# Patient Record
Sex: MALE | Race: WHITE | NOT HISPANIC OR LATINO | Employment: OTHER | URBAN - METROPOLITAN AREA
[De-identification: names, ages, dates, MRNs, and addresses within clinical notes are randomized per-mention and may not be internally consistent; named-entity substitution may affect disease eponyms.]

---

## 2021-10-10 ENCOUNTER — HISTORICAL (OUTPATIENT)
Dept: ADMINISTRATIVE | Facility: HOSPITAL | Age: 81
End: 2021-10-10

## 2022-04-29 VITALS
BODY MASS INDEX: 27.18 KG/M2 | OXYGEN SATURATION: 99 % | WEIGHT: 163.13 LBS | SYSTOLIC BLOOD PRESSURE: 119 MMHG | DIASTOLIC BLOOD PRESSURE: 75 MMHG | HEIGHT: 65 IN

## 2022-05-02 NOTE — HISTORICAL [facility]
This is a historical note converted from Omid. Formatting and pictures may have been removed.  Please reference Omid for original formatting and attached multimedia. Chief Complaint  fell and hurt left middle finger x 1 hour ago  History of Present Illness  Patient presents to the clinic shortly after injuring his left middle finger. ?He fell, the finger was crooked, he bent it back in place and placed a splint.  Review of Systems  Constitutional_no fever, fatigue, weakness  Musculoskeletal_left third finger injury  Integumentary_no skin rash or abnormal lesion  Neurologic_no headache, no dizziness, no weakness or numbness  ?  Physical Exam  Vitals & Measurements  T:?36.6? ?C (Oral)? HR:?68(Peripheral)? RR:?16? BP:?119/75? SpO2:?99%?  HT:?165.00?cm? WT:?74.000?kg? BMI:?27.18?  General_well-developed well-nourished in no acute distress  Musculoskeletal_some swelling, there does appear to be probable deformity of the proximal phalanx of the left middle finger  Integumentary_no rashes or skin lesions present  Neurologic_ cranial nerves intact, no signs of peripheral neurological deficit, motor/sensory function intact?  ?  After?informed consent was obtained,?a digital block was performed?on the affected finger, and using gentle traction?the PIP?joint?was reduced successfully.? Postreduction x-rays?showed?successful reduction of the dislocation,?slight extensor deviation  ?  Assessment/Plan  1.?Dislocated finger?S63.175A  ?We will contact with x-ray report?when available  Continue to wear the finger splint,?I would also suggest that they obtain something more comfortable over-the-counter  Orthopedic referral will be placed for further care  May use over-the-counter medications for pain  Ordered:  Finger Splint PC, 10/10/21 16:11:00 CDT, [facility]-RR, Routine, 10/10/21 16:11:00 CDT, Dislocated finger  ?  Orders:  XR Hand Third Digit Left Min 2 Views, Routine, 10/10/21 15:29:00 CDT, None, Ambulatory, Rad Type, Contusion of  left middle finger, Not Scheduled, 10/10/21 15:29:00 CDT  Referrals  Ambulatory Referral, Specialty: Orthopedic Surgery, Start: 10/10/21 16:09:00 CDT, Dislocated finger   Problem List/Past Medical History  Ongoing  Dislocated finger  Obesity  Historical  No qualifying data  Procedure/Surgical History  Coronary Angiogram/Stents  hernia repair   Medications  aspirin 81 mg oral Delayed Release (EC) tablet, 81 mg= 1 tab(s), Oral, Daily  CARVEDILOL 12.5 MG TABS, 12.5 mg= 1 tab(s), Oral, BID  CLOPIDOGREL 75 MG TABS, 75 mg= 1 tab(s), Oral, Daily  esomeprazole 40 mg oral DR capsule, 40 mg= 1 cap(s), Oral, Daily  lisinopril 5 mg oral tablet, 5 mg= 1 tab(s), Oral, Daily  LISINOPRIL 5 MG TABS, 5 mg= 1 tab(s), Oral, Daily  rosuvastatin 20 mg oral tablet, 20 mg= 1 tab(s), Oral, Daily  Zetia 10 mg oral tablet, 10 mg= 1 tab(s), Oral, Daily  Allergies  No Known Allergies  No Known Medication Allergies  Social History  Abuse/Neglect  No, 10/10/2021  Alcohol  Never, 06/12/2018  Employment/School  Employed, 06/12/2018  Home/Environment  Lives with Spouse. Living situation: Home/Independent., 06/12/2018  Substance Use  Never, 06/12/2018  Tobacco  Smoker, current status unknown, No, 10/10/2021  Family History  Kidney disease.....: Mother.  Primary malignant neoplasm of breast: Sister (Dx at 40).  Primary malignant neoplasm of colon: Father (Dx at 62) and Sister (Dx at 40).  Uterine cancer.....: Sister (Dx at 20).  Health Maintenance  Health Maintenance  ???Pending?(in the next year)  ??? ??OverDue  ??? ? ? ?Smoking Cessation due??01/01/21??Variable frequency  ??? ? ? ?Advance Directive due??01/02/21??and every 1??year(s)  ??? ? ? ?Cognitive Screening due??01/02/21??and every 1??year(s)  ??? ? ? ?Fall Risk Assessment due??01/02/21??and every 1??year(s)  ??? ? ? ?Functional Assessment due??01/02/21??and every 1??year(s)  ??? ??Due?  ??? ? ? ?ADL Screening due??10/10/21??and every 1??year(s)  ??? ? ? ?Medicare Annual Wellness Exam  due??10/10/21??and every 1??year(s)  ??? ? ? ?Tetanus Vaccine due??10/10/21??and every 10??year(s)  ??? ??Due In Future?  ??? ? ? ?Obesity Screening not due until??01/01/22??and every 1??year(s)  ???Satisfied?(in the past 1 year)  ??? ??Satisfied?  ??? ? ? ?Blood Pressure Screening on??10/10/21.??Satisfied by Sherri Khan LPN  ??? ? ? ?Body Mass Index Check on??10/10/21.??Satisfied by Sherri Khan LPN  ??? ? ? ?Influenza Vaccine on??10/10/21.??Satisfied by Sherri Khan LPN  ??? ? ? ?Obesity Screening on??10/10/21.??Satisfied by Sherri Khan LPN  ?

## 2022-05-06 ENCOUNTER — TELEPHONE (OUTPATIENT)
Dept: HEMATOLOGY/ONCOLOGY | Facility: CLINIC | Age: 82
End: 2022-05-06
Payer: MEDICARE

## 2022-05-10 ENCOUNTER — OFFICE VISIT (OUTPATIENT)
Dept: HEMATOLOGY/ONCOLOGY | Facility: CLINIC | Age: 82
End: 2022-05-10
Payer: MEDICARE

## 2022-05-10 VITALS
SYSTOLIC BLOOD PRESSURE: 111 MMHG | DIASTOLIC BLOOD PRESSURE: 67 MMHG | OXYGEN SATURATION: 97 % | TEMPERATURE: 98 F | BODY MASS INDEX: 24.84 KG/M2 | HEIGHT: 65 IN | RESPIRATION RATE: 18 BRPM | HEART RATE: 66 BPM | WEIGHT: 149.13 LBS

## 2022-05-10 DIAGNOSIS — Z51.11 ENCOUNTER FOR ANTINEOPLASTIC CHEMOTHERAPY: ICD-10-CM

## 2022-05-10 DIAGNOSIS — Z71.89 GOALS OF CARE, COUNSELING/DISCUSSION: ICD-10-CM

## 2022-05-10 DIAGNOSIS — C83.13 MANTLE CELL LYMPHOMA OF INTRA-ABDOMINAL LYMPH NODES: Primary | ICD-10-CM

## 2022-05-10 PROCEDURE — 99215 PR OFFICE/OUTPT VISIT, EST, LEVL V, 40-54 MIN: ICD-10-PCS | Mod: ,,, | Performed by: STUDENT IN AN ORGANIZED HEALTH CARE EDUCATION/TRAINING PROGRAM

## 2022-05-10 PROCEDURE — 99417 PR PROLONGED SVC, OUTPT, W/WO DIRECT PT CONTACT,  EA ADDTL 15 MIN: ICD-10-PCS | Mod: ,,, | Performed by: STUDENT IN AN ORGANIZED HEALTH CARE EDUCATION/TRAINING PROGRAM

## 2022-05-10 PROCEDURE — 99417 PROLNG OP E/M EACH 15 MIN: CPT | Mod: ,,, | Performed by: STUDENT IN AN ORGANIZED HEALTH CARE EDUCATION/TRAINING PROGRAM

## 2022-05-10 PROCEDURE — 99215 OFFICE O/P EST HI 40 MIN: CPT | Mod: ,,, | Performed by: STUDENT IN AN ORGANIZED HEALTH CARE EDUCATION/TRAINING PROGRAM

## 2022-05-10 RX ORDER — ROSUVASTATIN CALCIUM 20 MG/1
20 TABLET, COATED ORAL DAILY
COMMUNITY

## 2022-05-10 RX ORDER — EZETIMIBE 10 MG/1
10 TABLET ORAL DAILY
COMMUNITY

## 2022-05-10 RX ORDER — ASPIRIN 81 MG/1
81 TABLET ORAL DAILY
COMMUNITY

## 2022-05-10 RX ORDER — CARVEDILOL 12.5 MG/1
1 TABLET ORAL 2 TIMES DAILY
COMMUNITY
Start: 2022-05-07

## 2022-05-10 RX ORDER — ALLOPURINOL 300 MG/1
300 TABLET ORAL DAILY
COMMUNITY
Start: 2022-04-26 | End: 2022-06-24 | Stop reason: SDUPTHER

## 2022-05-10 NOTE — PROGRESS NOTES
Chief complaint: Fatigue     HPI: 82 y/o M w/ PMHx of HTN, HLD, GERD, CAD, PORTER referred to [facility] for lymphocytosis and lymphadenopathy    Of note, review of his records reveals a flow cytometry sent in 2018 that was consistent with CLL. He also had a single visit with Dr Snow in 2018 but never followed back.   Patient had a PET-CT as well as a bone marrow biopsy and repeat peripheral flow cytometry with a diagnosis of CLL/SLL and mantle cell lymphoma.  Patient had symptoms of fatigue and weight loss prior to his workup and diagnosis.    Today, 05/10/2022, patient presented to clinic to discuss further management.  He reports persistent fatigue, and weight loss however denies any symptoms of new lumps or bumps, drenching night sweats, fevers, chills or decreased appetite.  He remains independent with his ADLs and IADLs and continues to work on heavy machinery.  He lives with his wife and has 5 daughters who live close by.    PMHx: HTN, HLD, GERD, CAD, PORTER  PSHx: PCI x6, lipoma, hernia  Social Hx: never smoker, no ETOH, no drugs. He reports prior exposures to asbestos and pesticides, does not know which ones  Family Hx: father: colon cancer, sister: breast cancer, colon cancer  Meds: reviewed  Allergies: NKDA    Labs:  05/02/2022:  Creatinine 0.93, albumin 3.6, total protein 6.4, , potassium 5.2, WBC count 9.6, hemoglobin 11.4, .4, platelet count 92.   4/12/22 Cr 0.89 Alb 3.4 TP 6.4 Ca 9.4  TSH 2.0 Iron 58n TIBC 285 Ferritin 48 Folate 9.9 B12 426 Hep B s ag neg Uric acid 7.8 Hep C ab neg WBC 9.79 RBC 3.77 Hg 11.8 .5 RDW 13.4 PLT 91 ANC 2.79 ALC 6.36 Direct Ramos neg b2 micro 7.5 Copper 135 Hapto 225 Hep B core ab neg SPEP w/ ARABELLA normal Abs kappa 32 Abs lambda 124 SFLC ratio 0.26  3/18/22 Cr 1 AST 27 ALT 12 AlkPhos 87 Alb 4.1 TP 6.5 Ca 9.8 WBC 9.4 RBC 3.92 Hg 12.2 MCV 96 RDW 15.1  ALC 5.8 ANC 3  5/15/18 WBC 14.71 RBC 4.42 Hg 14.3 MCV 98.6 RDW 13  ANC 2.91 ALC 10.13  AMC 1.29    Imagin/10/22:-  right lower extremity Doppler negative for DVT      22 PET CT: multiple hypermetabolic LNs b/l neck. Right submandibular LN .7x1.7cm, SUV 7.8. Left submandibular LN 2.3x1.7cm SUV 7.2. Multiple hypermetabolic LNs in chest. Right axillary node 3.3x2cm SUV 8.1. Hypermetabolic left axillary LN SUV 6.4, 1.9x3.2cm. Hypermetabolic right paratracheal LN 2.4x1.6cm SUV 4.9. Right pulmonary hilum SUV 2.7. Subcarinal LN SUV 3.3, 1.2x3.3cm. Spleen enlarged at 15cm and demonstrates heterogeneous mild to moderate uptake SUV 5.2. Moderately intense activity in stomach and proximal duodenum with no degree of focality or discrete mass on CT. Multiple hypermetabolic LNs in abd and pelvis. Left periaortic LN SUV 6.8, 4x3.5cm. Large hypermetabolic mesenteric mass [id number] SUV 8.5. Aortocaval LN 3.5x3.8cm SUV 5.1. Hypermetabolic mass in left pelvis 5.5x7cm SUV 7. Left external iliac LN SUV 6 2.2x2.1cm. Extensive hypermetabolism anteriorly and laterally in left abdomen, associated with soft tissue thickening that is difficult to separate from bowel loops. B/l inguinal LNs up to 2.9x3.9cm, SUV 6.6    3/29/22 CT neck w/ and w/o contrast: widespread adenopathy identified in parotic glands bilateral, submandibular space, anterior and posterior cervical chains, superior mediastinum and supraclavicular and infraclavicular spaces of the base of neck/upper chest in appearance strongly suspicious for lymphoma. Multiple subcutaneous masses are also identified in lower occipital scalp and upper neck similar to lymphadenopathy. Biopsy of prominent subcutaneous mass in right upper neck is recommended and should present the easiest location for tissue sampling    Path:  18 peripheral blood flow cytometry: CD5+ B cell population. No abnormal T cell or myeloid cell populations. CD19+, CD20+, CD5+, CD23+, CD11c+ surface kappa light chain+, CD38-, CD22+ (dim/mod), CD43+, FMC7-. Consistent with  CLL    04/26/2022:  Bone marrow biopsy-marrow which Karina hematopoiesis with 10-20% B-cell infiltrate, C5 positive B-cell population with immunophenotype typical for CLL.  CLL fish within normal limits, heavy chain mutation status cannot be determined.  6q, chromosome 12, chromosome 13, chromosome 11, chromosome 17 and t( 11;14) abnormalities not detected.    05/06/2022 left axillary lymph node core biopsy:  Mantle cell lymphoma with a subset consistent with CLL/SLL., CD5 positive monoclonal B-cell population is identified, approximately 91% of total cells, immuno phenotype is nonspecific and may be seen in CLL, marginal zone lymphoma, or large cell lymphoma.  A 2nd small monoclonal B-cell population approximately 2% of the cells identified with an immunophenotype consistent with CLL.  New genomic consultation lymphoma cells positive for CD 20, CD 5, cyclin D1 and BCL2 with a TI 67 of 10%.  There is no significant staining for CD10, BCL6.  However workup consistent with diagnosis of mantle cell lymphoma.    Review of Systems     CONSTITUTIONAL: no fevers, no chills, + weight loss, + fatigue, no weakness  HEMATOLOGIC: no abnormal bleeding, no abnormal bruising, + drenching night sweats  ONCOLOGIC: no new masses or lumps  HEENT: no vision loss, no tinnitus or hearing loss, no nose bleeding, no dysphagia, no odynophagia  CVS: no chest pain, no palpitations, no dyspnea on exertion  RESP: no shortness of breath, no hemoptysis, no cough  GI: no nausea, no vomiting, no diarrhea, no constipation, no melena, no hematochezia, no hematemesis, no abdominal pain, no increase in abdominal girth  : no dysuria, no hematuria, no hesitancy, no scrotal swelling, no discharge  INTEGUMENT: no rashes, no abnormal bruising, no nail pitting, no hyperpigmentation  NEURO: no falls, no memory loss, no paresthesias or dysesthesias, no urofecal incontinence or retention, no loss of strength on any extremity  MSK: no back pain, no new  joint pain, no joint swelling  PSYCH: no suicidal or homicidal ideation, no depression, no insomnia, no anhedonia  ENDOCRINE: no heat or cold intolerance, no polyuria, no polydipsia      Physical Exam     Vitals:    05/10/22 1023   BP: 111/67   Pulse: 66   Resp: 18   Temp: 98 °F (36.7 °C)       GA: AAOx3, NAD  HEENT: NCAT, PERRLA, EOMI, edentulous, no oral ulcers  LYMPH: multiple palpable occipital, posterior and anterior cervical chain b/l, supraclav b/l, axillary b/l, inguinal b/l LNs.   CVS: s1s2 RRR, no M/R/G  RESP: CTA b/l, no crackles, no wheezes or rhonchi  ABD: soft, NT, ND, BS+, no hepatomegaly, mild splenomegaly  EXT: no deformities, right lower extremity edema.  SKIN: no rashes, no bruises or purpura, warm and dry  NEURO: normal mentation, strength 5/5 on all 4 extremities, no sensory deficits     Assessment/Plan      # Mantle cell lymphoma, Clover stage III or IV based on PET/CT  Ki 67 10%, MIPI 6.7, high risk, SOX11 not known  Bone marrow biopsy negative for evidence of mantle cell lymphoma.  Positive for CLL.  PET-CT with evidence of bulky lymphadenopathy, largest measuring 14 cm, spleen measuring 15 cm.  Discussed with patient and his family the diagnosis, natural history and treatment options.  Discussed the risk for TLS given bulky lymphadenopathy, perforation given lymphadenopathy around the bowel loops as well as declining ECOG given his advanced age.  After extensive discussion with patient, his daughter and his wife, discussed the plan to proceed with dose reduced bendamustine and rituximab.  Will split rituximab 1 week after treatment with bendamustine to reduce the risk of perforation.  Will start PJP prophylaxis with Bactrim Monday Wednesday Friday and valacyclovir with treatment.  Requested Dr. Villegas for urgent port placement  Expected chemo education appointment  Plan to start chemotherapy on 05/18/2022, dose reduce bendamustine to 75% on day 1 day 2 of cycle 1.  Rituximab 375 mg per  m2 on day 7.  Patient remains on allopurinol 300 mg q.day for TLS prophylaxis.  We plan to check patient's BMP and phosphorus thrice weekly for the 1st 2 weeks.  Low threshold to start rasburicase given patient's bulky adenopathy.  Patient will benefit from home health referral for blood draws.  We will discuss with this at his next visit.     1. 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     Based on available information, likely SLL/CLL with some constitutional symptoms including weight loss, fatigue and night sweats. Adenopathy easily palpable above and below the diaphragm. Mild thrombocytopenia w/ PLT count ~120k and very mild anemia with Hg ~12 g/dl. No hypercalcemia, no evidence of immune paresis  Biopsies 4/11/22 with Dr Brannon were FNAs and non diagnostic for CLL. I do not know what IHC was obtained or whether pathologist knew what the potential diagnosis was as report only says negative for carcinoma. Will discuss with Dr Lan to add CD5, CD10, CD19, CD20 etc if enough tissue  Regardless, will also refer for axillary LN biopsy, either excisional or at the very least core. Referral sent today  PET CT 4/2022 findings are noted. Given his advanced age he was not refer for EGD. Masses in abdomen are likely matted gurinder conglomerates and I do not think it would be worth it to attempt a biopsy of those  CLL FISH  Negative , IGHV mutation status  Hep B and C negative  Uric acid mildly elevated initially, on allopurinol 300mg daily with PO hydration prior to initiation of any therapy  Given concomitant diagnosis of CLL and mantle cell lymphoma will plan to initiate treatment for mantle cell lymphoma and CLL with bendamustine rituximab.  Will consider BTK inhibitors if patient is intolerant to bendamustine/rituximab in the second-line    Plan  # port placement urgently, discussed with surgery  # chemo education  # orders placed for bendamustine rituximab, dose reduced  bendamustine by 25%.  Rituximab to be administered on day 7 to reduce the risk of perforation  # Patient will be starting Bactrim Monday Wednesday Friday and valacyclovir for prophylaxis.  # plan  BMP Monday Wednesday Friday for 1st 2 weeks for close surveillance for TLS.  # Continue acyclovir 300 mg q.day  # plan to follow-up in clinic 2 weeks after patient's cycle 1 day 1 to assess treatment tolerance.      A total of  100 minutes were spent in review of records, interpretation of test, coordination of care, discussion and counseling with the patient.

## 2022-05-11 PROBLEM — C83.13: Status: ACTIVE | Noted: 2022-05-11

## 2022-05-11 PROBLEM — Z71.89 GOALS OF CARE, COUNSELING/DISCUSSION: Status: ACTIVE | Noted: 2022-05-11

## 2022-05-11 PROBLEM — Z51.11 ENCOUNTER FOR ANTINEOPLASTIC CHEMOTHERAPY: Status: ACTIVE | Noted: 2022-05-11

## 2022-05-11 RX ORDER — EPINEPHRINE 0.3 MG/.3ML
0.3 INJECTION SUBCUTANEOUS ONCE AS NEEDED
Status: CANCELLED | OUTPATIENT
Start: 2022-05-19

## 2022-05-11 RX ORDER — HEPARIN 100 UNIT/ML
500 SYRINGE INTRAVENOUS
Status: CANCELLED | OUTPATIENT
Start: 2022-05-18

## 2022-05-11 RX ORDER — SULFAMETHOXAZOLE AND TRIMETHOPRIM 800; 160 MG/1; MG/1
1 TABLET ORAL
Qty: 30 TABLET | Refills: 3 | Status: SHIPPED | OUTPATIENT
Start: 2022-05-18 | End: 2022-07-20 | Stop reason: SDUPTHER

## 2022-05-11 RX ORDER — DIPHENHYDRAMINE HYDROCHLORIDE 50 MG/ML
50 INJECTION INTRAMUSCULAR; INTRAVENOUS ONCE AS NEEDED
Status: CANCELLED | OUTPATIENT
Start: 2022-05-18

## 2022-05-11 RX ORDER — DIPHENHYDRAMINE HYDROCHLORIDE 50 MG/ML
50 INJECTION INTRAMUSCULAR; INTRAVENOUS ONCE AS NEEDED
Status: CANCELLED | OUTPATIENT
Start: 2022-05-19

## 2022-05-11 RX ORDER — ACETAMINOPHEN 325 MG/1
650 TABLET ORAL
Status: CANCELLED | OUTPATIENT
Start: 2022-05-18

## 2022-05-11 RX ORDER — SODIUM CHLORIDE 0.9 % (FLUSH) 0.9 %
10 SYRINGE (ML) INJECTION
Status: CANCELLED | OUTPATIENT
Start: 2022-05-18

## 2022-05-11 RX ORDER — EPINEPHRINE 0.3 MG/.3ML
0.3 INJECTION SUBCUTANEOUS ONCE AS NEEDED
Status: CANCELLED | OUTPATIENT
Start: 2022-05-18

## 2022-05-11 RX ORDER — HEPARIN 100 UNIT/ML
500 SYRINGE INTRAVENOUS
Status: CANCELLED | OUTPATIENT
Start: 2022-05-19

## 2022-05-11 RX ORDER — VALACYCLOVIR HYDROCHLORIDE 500 MG/1
500 TABLET, FILM COATED ORAL 2 TIMES DAILY
Qty: 60 TABLET | Refills: 3 | Status: SHIPPED | OUTPATIENT
Start: 2022-05-18 | End: 2022-09-14 | Stop reason: SDUPTHER

## 2022-05-11 RX ORDER — FAMOTIDINE 10 MG/ML
20 INJECTION INTRAVENOUS
Status: CANCELLED | OUTPATIENT
Start: 2022-05-18

## 2022-05-11 RX ORDER — MEPERIDINE HYDROCHLORIDE 50 MG/ML
25 INJECTION INTRAMUSCULAR; INTRAVENOUS; SUBCUTANEOUS
Status: CANCELLED | OUTPATIENT
Start: 2022-05-18

## 2022-05-11 RX ORDER — SODIUM CHLORIDE 0.9 % (FLUSH) 0.9 %
10 SYRINGE (ML) INJECTION
Status: CANCELLED | OUTPATIENT
Start: 2022-05-19

## 2022-05-18 ENCOUNTER — OFFICE VISIT (OUTPATIENT)
Dept: HEMATOLOGY/ONCOLOGY | Facility: CLINIC | Age: 82
End: 2022-05-18
Payer: MEDICARE

## 2022-05-18 DIAGNOSIS — C83.13 MANTLE CELL LYMPHOMA OF INTRA-ABDOMINAL LYMPH NODES: Primary | ICD-10-CM

## 2022-05-18 PROCEDURE — 99443 PR PHYSICIAN TELEPHONE EVALUATION 21-30 MIN: ICD-10-PCS | Mod: 95,,, | Performed by: NURSE PRACTITIONER

## 2022-05-18 PROCEDURE — 99443 PR PHYSICIAN TELEPHONE EVALUATION 21-30 MIN: CPT | Mod: 95,,, | Performed by: NURSE PRACTITIONER

## 2022-05-18 RX ORDER — ONDANSETRON HYDROCHLORIDE 8 MG/1
8 TABLET, FILM COATED ORAL EVERY 8 HOURS PRN
Qty: 30 TABLET | Refills: 1 | Status: SHIPPED | OUTPATIENT
Start: 2022-05-18

## 2022-05-18 NOTE — PROGRESS NOTES
Subjective:       Patient ID: Marcus Valero is a 81 y.o. male.    Chief Complaint: chemo education      Today's visit done via telephone using real time audio according to [facility] protocols. I am licensed in the state where the patient said they were located. The patient and his wife were present today during interview. They stated that they understood and accepted the privacy and security risks to the information at their non-[facility] location and verbal consent was obtained for telephone visit. The patient was located in his home. I, DONNA Morocho, was located in my office at [facility].      History of Present Illness  HPI: 80 y/o M w/ PMHx of HTN, HLD, GERD, CAD, PORTER referred to [facility] for lymphocytosis and lymphadenopathy    Of note, review of his records reveals a flow cytometry sent in 2018 that was consistent with CLL. He also had a single visit with Dr Snow in 2018 but never followed back.   Patient had a PET-CT as well as a bone marrow biopsy and repeat peripheral flow cytometry with a diagnosis of CLL/SLL and mantle cell lymphoma.  Patient had symptoms of fatigue and weight loss prior to his workup and diagnosis.    Today 5/18/22: Patient and his wife via telephone today for chemotherapy education. He received C1D1 bendamustine this morning. He tolerated his treatment very well and denies any side effects thus far. He ate a good lunch and denies any nausea post treatment. He reports persistent fatigue, and weight loss however denies any symptoms of new lumps or bumps, drenching night sweats, fevers, chills or decreased appetite.  He remains independent with his ADLs and IADLs and continues to work on heavy machinery.  He lives with his wife and has 5 daughters who live close by.     PMHx: HTN, HLD, GERD, CAD, PORTER  PSHx: PCI x6, lipoma, hernia  Social Hx: never smoker, no ETOH, no drugs. He reports prior exposures to asbestos and pesticides, does not know which ones  Family Hx: father:  colon cancer, sister: breast cancer, colon cancer  Meds: reviewed  Allergies: NKDA    Labs:  2022:  Creatinine 0.93, albumin 3.6, total protein 6.4, , potassium 5.2, WBC count 9.6, hemoglobin 11.4, .4, platelet count 92.   4/ Cr 0.89 Alb 3.4 TP 6.4 Ca 9.4  TSH 2.0 Iron 58n TIBC 285 Ferritin 48 Folate 9.9 B12 426 Hep B s ag neg Uric acid 7.8 Hep C ab neg WBC 9.79 RBC 3.77 Hg 11.8 .5 RDW 13.4 PLT 91 ANC 2.79 ALC 6.36 Direct Ramos neg b2 micro 7.5 Copper 135 Hapto 225 Hep B core ab neg SPEP w/ ARABELLA normal Abs kappa 32 Abs lambda 124 SFLC ratio 0.26  3/18/22 Cr 1 AST 27 ALT 12 AlkPhos 87 Alb 4.1 TP 6.5 Ca 9.8 WBC 9.4 RBC 3.92 Hg 12.2 MCV 96 RDW 15.1  ALC 5.8 ANC 3  5/15/18 WBC 14.71 RBC 4.42 Hg 14.3 MCV 98.6 RDW 13  ANC 2.91 ALC 10.13 AMC 1.29    Imagin/10/22:-  right lower extremity Doppler negative for DVT      22 PET CT: multiple hypermetabolic LNs b/l neck. Right submandibular LN .7x1.7cm, SUV 7.8. Left submandibular LN 2.3x1.7cm SUV 7.2. Multiple hypermetabolic LNs in chest. Right axillary node 3.3x2cm SUV 8.1. Hypermetabolic left axillary LN SUV 6.4, 1.9x3.2cm. Hypermetabolic right paratracheal LN 2.4x1.6cm SUV 4.9. Right pulmonary hilum SUV 2.7. Subcarinal LN SUV 3.3, 1.2x3.3cm. Spleen enlarged at 15cm and demonstrates heterogeneous mild to moderate uptake SUV 5.2. Moderately intense activity in stomach and proximal duodenum with no degree of focality or discrete mass on CT. Multiple hypermetabolic LNs in abd and pelvis. Left periaortic LN SUV 6.8, 4x3.5cm. Large hypermetabolic mesenteric mass 6e8q15cm SUV 8.5. Aortocaval LN 3.5x3.8cm SUV 5.1. Hypermetabolic mass in left pelvis 5.5x7cm SUV 7. Left external iliac LN SUV 6 2.2x2.1cm. Extensive hypermetabolism anteriorly and laterally in left abdomen, associated with soft tissue thickening that is difficult to separate from bowel loops. B/l inguinal LNs up to 2.9x3.9cm, SUV 6.6    3/29/22 CT neck w/ and  w/o contrast: widespread adenopathy identified in parotic glands bilateral, submandibular space, anterior and posterior cervical chains, superior mediastinum and supraclavicular and infraclavicular spaces of the base of neck/upper chest in appearance strongly suspicious for lymphoma. Multiple subcutaneous masses are also identified in lower occipital scalp and upper neck similar to lymphadenopathy. Biopsy of prominent subcutaneous mass in right upper neck is recommended and should present the easiest location for tissue sampling    Path:  5/23/18 peripheral blood flow cytometry: CD5+ B cell population. No abnormal T cell or myeloid cell populations. CD19+, CD20+, CD5+, CD23+, CD11c+ surface kappa light chain+, CD38-, CD22+ (dim/mod), CD43+, FMC7-. Consistent with CLL    04/26/2022:  Bone marrow biopsy-marrow which Karina hematopoiesis with 10-20% B-cell infiltrate, C5 positive B-cell population with immunophenotype typical for CLL.  CLL fish within normal limits, heavy chain mutation status cannot be determined.  6q, chromosome 12, chromosome 13, chromosome 11, chromosome 17 and t( 11;14) abnormalities not detected.    05/06/2022 left axillary lymph node core biopsy:  Mantle cell lymphoma with a subset consistent with CLL/SLL., CD5 positive monoclonal B-cell population is identified, approximately 91% of total cells, immuno phenotype is nonspecific and may be seen in CLL, marginal zone lymphoma, or large cell lymphoma.  A 2nd small monoclonal B-cell population approximately 2% of the cells identified with an immunophenotype consistent with CLL.  New genomic consultation lymphoma cells positive for CD 20, CD 5, cyclin D1 and BCL2 with a TI 67 of 10%.  There is no significant staining for CD10, BCL6.  However workup consistent with diagnosis of mantle cell lymphoma.    Review of Systems  CONSTITUTIONAL: no fevers, no chills, + weight loss, + fatigue, no weakness  HEMATOLOGIC: no abnormal bleeding, no abnormal  bruising, + drenching night sweats  ONCOLOGIC: no new masses or lumps  HEENT: no vision loss, no tinnitus or hearing loss, no nose bleeding, no dysphagia, no odynophagia  CVS: no chest pain, no palpitations, no dyspnea on exertion  RESP: no shortness of breath, no hemoptysis, no cough  GI: no nausea, no vomiting, no diarrhea, no constipation, no melena, no hematochezia, no hematemesis, no abdominal pain, no increase in abdominal girth  : no dysuria, no hematuria, no hesitancy, no scrotal swelling, no discharge  INTEGUMENT: no rashes, no abnormal bruising, no nail pitting, no hyperpigmentation  NEURO: no falls, no memory loss, no paresthesias or dysesthesias, no urofecal incontinence or retention, no loss of strength on any extremity  MSK: no back pain, no new joint pain, no joint swelling  PSYCH: no suicidal or homicidal ideation, no depression, no insomnia, no anhedonia  ENDOCRINE: no heat or cold intolerance, no polyuria, no polydipsia      Objective:      Unable to perform PE, Telephone visit for chemotherapy education    Assessment:       Problem List Items Addressed This Visit        Oncology    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 Mantle cell lymphoma  [place] stage III or IV based on PET/CT  Ki 67 10%, MIPI 6.7, high risk, SOX11 not known  Bone marrow biopsy negative for evidence of mantle cell lymphoma.  Positive for CLL.  PET-CT with evidence of bulky lymphadenopathy, largest measuring 14 cm, spleen measuring 15 cm.  Discussed with patient and his family the diagnosis, natural history and treatment options.  Discussed the risk for TLS given bulky lymphadenopathy, perforation given lymphadenopathy around the bowel loops as well as declining ECOG given his advanced age.  After extensive discussion with patient, his daughter and his wife, discussed the plan to proceed with dose reduced bendamustine and rituximab.   Will split rituximab 1 week after treatment with bendamustine to reduce the risk of perforation.  Initiated PJP prophylaxis with Bactrim Monday Wednesday Friday and valacyclovir with treatment.  Plan to start chemotherapy on 05/18/2022, dose reduce bendamustine to 75% on day 1 day 2 of cycle 1.  Rituximab 375 mg per m2 on day 7.  Patient remains on allopurinol 300 mg q.day for TLS prophylaxis.  We plan to check patient's BMP and phosphorus 3x weekly for the 1st 2 weeks.  Low threshold to start rasburicase given patient's bulky adenopathy.     1. 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   Based on available information, likely SLL/CLL with some constitutional symptoms including weight loss, fatigue and night sweats. Adenopathy easily palpable above and below the diaphragm. Mild thrombocytopenia w/ PLT count ~120k and very mild anemia with Hg ~12 g/dl. No hypercalcemia, no evidence of immune paresis  Biopsies 4/11/22 with Dr Brannon were FNAs and non diagnostic for CLL. I do not know what IHC was obtained or whether pathologist knew what the potential diagnosis was as report only says negative for carcinoma. Will discuss with Dr Lan to add CD5, CD10, CD19, CD20 etc if enough tissue  Regardless, will also refer for axillary LN biopsy, either excisional or at the very least core. Referral sent today  PET CT 4/2022 findings are noted. Given his advanced age he was not refer for EGD. Masses in abdomen are likely matted gurinder conglomerates and I do not think it would be worth it to attempt a biopsy of those  CLL FISH  Negative , IGHV mutation status  Hep B and C negative  Uric acid mildly elevated initially, on allopurinol 300mg daily with PO hydration prior to initiation of any therapy  Given concomitant diagnosis of CLL and mantle cell lymphoma will plan to initiate treatment for mantle cell lymphoma and CLL with bendamustine rituximab.  Will consider BTK inhibitors if  patient is intolerant to bendamustine/rituximab in the second-line      Plan:       Chemotherapy education provided and all questions answered.  Discussed Goals of Therapy-Yes.    Discussed Chemo Drugs-Yes.    Discussed Chemo Regimen-Yes.    Discussed short - and long-term adverse Effects-Yes.    Discussed S/S to call MD/NP-Yes.    Written material given to patient-Yes, educational handouts and chemo binder placed at [facility]. Patient to  tomorrow when he comes for treatment.    Questions answered-Yes.    Verbalized Understanding-Yes.    Counseled::  Patient and spouse, Regarding diagnosis, Regarding treatment, Regarding medications, Regarding diet, Regarding activity, Verbalized understanding.     #Plan is for Bendamustine/ Rtuximab, dose reduced bendamustine by 25%. Rituximab (375 mg per m2) to be administered on day 7 to reduce the risk of perforation  #Port placed  #Received cycle 1 day 1 Bendamustine today. Tolerated very well. He is scheduled to return tomorrow for cycle 1 day 2 Bendamustine and on 5/25/22 for first Rituximab   #Will obtain BMP and Phos Monday/Wednesday/Friday for 1st 2 weeks for close surveillance for TLS. Low threshold to start rasburicase given patient's bulky adenopathy.  #Patient will benefit from home health referral for blood draws. We will discuss with this at his next visit.  #C/w Bactrim Monday Wednesday Friday and valacyclovir BID for prophylaxis.  #C/w acyclovir 300 mg q.day  #C/w Allopurinol 300 mg q day  #Zofran 8 mg q8h PRN nausea/vomiting sent to pharmacy  #RT on 5/30/22 to assess treatment tolerance with labs  #Call if any questions or concerns    Treatment Plan Information   OP BENDAMUSTINE RITUXIMAB FOR NHL   Nazario Brock MD   Upcoming Treatment Dates - OP BENDAMUSTINE RITUXIMAB FOR NHL    5/19/2022       Pre-Medications       dexamethasone (DECADRON) 12 mg in sodium chloride 0.9% 50 mL IVPB       Chemotherapy       bendamustine (BELRAPZO) 120 mg in  sodium chloride 0.9% 504.8 mL chemo infusion  5/25/2022       Chemotherapy       rituximab-pvvr (RUXIENCE) 375 mg/m2 = 660 mg in sodium chloride 0.9% 660 mL infusion (conc: 1 mg/mL)       Supportive Care       meperidine injection 25 mg  6/15/2022       Pre-Medications       palonosetron (ALOXI) 0.25 mg with Dexamethasone (DECADRON) 12 mg in NS 50 mL IVPB       palonosetron (ALOXI) 0.25 mg with Dexamethasone (DECADRON) 12 mg in NS 50 mL IVPB       Chemotherapy       rituxan hycela 1400 mg/11.7 mL (120 mg/mL) injection 1,400 mg       bendamustine (BELRAPZO) 157.5 mg in sodium chloride 0.9% 506.3 mL chemo infusion       Supportive Care       meperidine injection 25 mg  6/16/2022       Pre-Medications       dexamethasone (DECADRON) 12 mg in sodium chloride 0.9% 50 mL IVPB       Chemotherapy       bendamustine (BELRAPZO) 157.5 mg in sodium chloride 0.9% 506.3 mL chemo infusion      Total time spent on medical discussion and chemotherapy education: 34 minutes    DONNA Morocho

## 2022-05-25 RX ORDER — SODIUM CHLORIDE 0.9 % (FLUSH) 0.9 %
10 SYRINGE (ML) INJECTION
Status: CANCELLED | OUTPATIENT
Start: 2022-05-25

## 2022-05-25 RX ORDER — DIPHENHYDRAMINE HYDROCHLORIDE 50 MG/ML
50 INJECTION INTRAMUSCULAR; INTRAVENOUS ONCE AS NEEDED
Status: CANCELLED | OUTPATIENT
Start: 2022-05-25

## 2022-05-25 RX ORDER — FAMOTIDINE 10 MG/ML
20 INJECTION INTRAVENOUS
Status: CANCELLED | OUTPATIENT
Start: 2022-05-25

## 2022-05-25 RX ORDER — HEPARIN 100 UNIT/ML
500 SYRINGE INTRAVENOUS
Status: CANCELLED | OUTPATIENT
Start: 2022-05-25

## 2022-05-25 RX ORDER — EPINEPHRINE 0.3 MG/.3ML
0.3 INJECTION SUBCUTANEOUS ONCE AS NEEDED
Status: CANCELLED | OUTPATIENT
Start: 2022-05-25

## 2022-05-25 RX ORDER — MEPERIDINE HYDROCHLORIDE 50 MG/ML
25 INJECTION INTRAMUSCULAR; INTRAVENOUS; SUBCUTANEOUS
Status: CANCELLED | OUTPATIENT
Start: 2022-05-25

## 2022-05-25 RX ORDER — ACETAMINOPHEN 325 MG/1
650 TABLET ORAL
Status: CANCELLED | OUTPATIENT
Start: 2022-05-25

## 2022-05-30 ENCOUNTER — OFFICE VISIT (OUTPATIENT)
Dept: HEMATOLOGY/ONCOLOGY | Facility: CLINIC | Age: 82
End: 2022-05-30
Payer: MEDICARE

## 2022-05-30 DIAGNOSIS — C83.13 MANTLE CELL LYMPHOMA OF INTRA-ABDOMINAL LYMPH NODES: Primary | ICD-10-CM

## 2022-05-30 PROCEDURE — 99215 OFFICE O/P EST HI 40 MIN: CPT | Mod: ,,, | Performed by: STUDENT IN AN ORGANIZED HEALTH CARE EDUCATION/TRAINING PROGRAM

## 2022-05-30 PROCEDURE — 99215 PR OFFICE/OUTPT VISIT, EST, LEVL V, 40-54 MIN: ICD-10-PCS | Mod: ,,, | Performed by: STUDENT IN AN ORGANIZED HEALTH CARE EDUCATION/TRAINING PROGRAM

## 2022-05-30 NOTE — PROGRESS NOTES
Chief complaint: Fatigue     Treatment history  05/18/2022:  Cycle 1 bendamustine, dose reduced by 725%.  05/25/2022 : Cycle 1 rituximab      HPI: 80 y/o M w/ PMHx of HTN, HLD, GERD, CAD, PORTER referred to [facility] for lymphocytosis and lymphadenopathy    Of note, review of his records reveals a flow cytometry sent in 2018 that was consistent with CLL. He also had a single visit with Dr Snow in 2018 but never followed back.   Patient had a PET-CT as well as a bone marrow biopsy and repeat peripheral flow cytometry with a diagnosis of CLL/SLL and mantle cell lymphoma.  Patient had symptoms of fatigue and weight loss prior to his workup and diagnosis.    Today, 05/30/2022, patient reports improvement in his lymphadenopathy in his neck.  He reports a good appetite, denies any nausea, vomiting, fevers or chills.  He does report constipation and reports weight loss.  He denies any abdominal pain.  He denies any blood in his stools or dark tarry stools.    PMHx: HTN, HLD, GERD, CAD, PORTER  PSHx: PCI x6, lipoma, hernia  Social Hx: never smoker, no ETOH, no drugs. He reports prior exposures to asbestos and pesticides, does not know which ones  Family Hx: father: colon cancer, sister: breast cancer, colon cancer  Meds: reviewed  Allergies: NKDA    Labs:  05/27/2022:  Potassium 4.9, sodium 140, creatinine 0.87, , uric acid 6.6.  05/17/2022:  Creatinine 1.03, albumin 3.3, ,  WBC count 10.32, hemoglobin 12.4, platelet count 135 1000.   05/02/2022:  Creatinine 0.93, albumin 3.6, total protein 6.4, , potassium 5.2, WBC count 9.6, hemoglobin 11.4, .4, platelet count 92.   4/12/22 Cr 0.89 Alb 3.4 TP 6.4 Ca 9.4  TSH 2.0 Iron 58n TIBC 285 Ferritin 48 Folate 9.9 B12 426 Hep B s ag neg Uric acid 7.8 Hep C ab neg WBC 9.79 RBC 3.77 Hg 11.8 .5 RDW 13.4 PLT 91 ANC 2.79 ALC 6.36 Direct Ramos neg b2 micro 7.5 Copper 135 Hapto 225 Hep B core ab neg SPEP w/ ARABELLA normal Abs kappa 32 Abs lambda 124  SFLC ratio 0.26  3/18/22 Cr 1 AST 27 ALT 12 AlkPhos 87 Alb 4.1 TP 6.5 Ca 9.8 WBC 9.4 RBC 3.92 Hg 12.2 MCV 96 RDW 15.1  ALC 5.8 ANC 3  5/15/18 WBC 14.71 RBC 4.42 Hg 14.3 MCV 98.6 RDW 13  ANC 2.91 ALC 10.13 AMC 1.29    Imagin/10/22:-  right lower extremity Doppler negative for DVT      22 PET CT: multiple hypermetabolic LNs b/l neck. Right submandibular LN .7x1.7cm, SUV 7.8. Left submandibular LN 2.3x1.7cm SUV 7.2. Multiple hypermetabolic LNs in chest. Right axillary node 3.3x2cm SUV 8.1. Hypermetabolic left axillary LN SUV 6.4, 1.9x3.2cm. Hypermetabolic right paratracheal LN 2.4x1.6cm SUV 4.9. Right pulmonary hilum SUV 2.7. Subcarinal LN SUV 3.3, 1.2x3.3cm. Spleen enlarged at 15cm and demonstrates heterogeneous mild to moderate uptake SUV 5.2. Moderately intense activity in stomach and proximal duodenum with no degree of focality or discrete mass on CT. Multiple hypermetabolic LNs in abd and pelvis. Left periaortic LN SUV 6.8, 4x3.5cm. Large hypermetabolic mesenteric mass [id number] SUV 8.5. Aortocaval LN 3.5x3.8cm SUV 5.1. Hypermetabolic mass in left pelvis 5.5x7cm SUV 7. Left external iliac LN SUV 6 2.2x2.1cm. Extensive hypermetabolism anteriorly and laterally in left abdomen, associated with soft tissue thickening that is difficult to separate from bowel loops. B/l inguinal LNs up to 2.9x3.9cm, SUV 6.6    3/29/22 CT neck w/ and w/o contrast: widespread adenopathy identified in parotic glands bilateral, submandibular space, anterior and posterior cervical chains, superior mediastinum and supraclavicular and infraclavicular spaces of the base of neck/upper chest in appearance strongly suspicious for lymphoma. Multiple subcutaneous masses are also identified in lower occipital scalp and upper neck similar to lymphadenopathy. Biopsy of prominent subcutaneous mass in right upper neck is recommended and should present the easiest location for tissue sampling    Path:  18 peripheral blood flow  cytometry: CD5+ B cell population. No abnormal T cell or myeloid cell populations. CD19+, CD20+, CD5+, CD23+, CD11c+ surface kappa light chain+, CD38-, CD22+ (dim/mod), CD43+, FMC7-. Consistent with CLL    04/26/2022:  Bone marrow biopsy-marrow which Karina hematopoiesis with 10-20% B-cell infiltrate, C5 positive B-cell population with immunophenotype typical for CLL.  CLL fish within normal limits, heavy chain mutation status cannot be determined.  6q, chromosome 12, chromosome 13, chromosome 11, chromosome 17 and t( 11;14) abnormalities not detected.    05/06/2022 left axillary lymph node core biopsy:  Mantle cell lymphoma with a subset consistent with CLL/SLL., CD5 positive monoclonal B-cell population is identified, approximately 91% of total cells, immuno phenotype is nonspecific and may be seen in CLL, marginal zone lymphoma, or large cell lymphoma.  A 2nd small monoclonal B-cell population approximately 2% of the cells identified with an immunophenotype consistent with CLL.  New genomic consultation lymphoma cells positive for CD 20, CD 5, cyclin D1 and BCL2 with a TI 67 of 10%.  There is no significant staining for CD10, BCL6.  However workup consistent with diagnosis of mantle cell lymphoma.    Review of Systems     CONSTITUTIONAL: no fevers, no chills, + weight loss, + fatigue, no weakness  HEMATOLOGIC: no abnormal bleeding, no abnormal bruising, + drenching night sweats  ONCOLOGIC: no new masses or lumps  HEENT: no vision loss, no tinnitus or hearing loss, no nose bleeding, no dysphagia, no odynophagia  CVS: no chest pain, no palpitations, no dyspnea on exertion  RESP: no shortness of breath, no hemoptysis, no cough  GI: no nausea, no vomiting, no diarrhea, no constipation, no melena, no hematochezia, no hematemesis, no abdominal pain, no increase in abdominal girth  : no dysuria, no hematuria, no hesitancy, no scrotal swelling, no discharge  INTEGUMENT: no rashes, no abnormal bruising, no nail  pitting, no hyperpigmentation  NEURO: no falls, no memory loss, no paresthesias or dysesthesias, no urofecal incontinence or retention, no loss of strength on any extremity  MSK: no back pain, no new joint pain, no joint swelling  PSYCH: no suicidal or homicidal ideation, no depression, no insomnia, no anhedonia  ENDOCRINE: no heat or cold intolerance, no polyuria, no polydipsia      Physical Exam     Vitals:    05/30/22 0945   BP: (P) 125/82   Pulse: (P) 72   Resp: (P) 18   Temp: (P) 97.6 °F (36.4 °C)       GA: AAOx3, NAD  HEENT: NCAT, PERRLA, EOMI, edentulous, no oral ulcers  LYMPH: multiple palpable occipital, posterior and anterior cervical chain b/l, supraclav b/l, axillary b/l, LNs. Occipital node has gone down in size from previous exam   CVS: s1s2 RRR, no M/R/G, port in place in right chest wall without surrounding edema or erythema.  RESP: CTA b/l, no crackles, no wheezes or rhonchi  ABD: soft, NT, ND, BS+, no hepatomegaly, mild splenomegaly  EXT: no deformities, right lower extremity edema.  SKIN: no rashes, no bruises or purpura, warm and dry  NEURO: normal mentation, strength 5/5 on all 4 extremities, no sensory deficits     Assessment/Plan      # Mantle cell lymphoma, [place]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  Positive for CLL.  PET-CT with evidence of bulky lymphadenopathy, largest measuring 14 cm, spleen measuring 15 cm.  Discussed with patient and his family the diagnosis, natural history and treatment options.  Discussed the risk for TLS given bulky lymphadenopathy, perforation given lymphadenopathy around the bowel loops as well as declining ECOG given his advanced age.  Continuet PJP prophylaxis with Bactrim Monday Wednesday Friday and valacyclovir with treatment.  Continue allopurinol 300 mg q.day for TLS prophylaxis.  Patient is status post cycle 1 of bendamustine and rituximab on  05/18/2022.  Patient has bendamustine was reduced by 25% and rituximab was given 1 week apart to reduce the risk of perforation.  Patient tolerated chemotherapy well without any overt adverse effects.  He reports symptoms of constipation but reports a good appetite denies any nausea.  Patient denies any fevers or chills and is compliant with his anti-infectives.     1. 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     Based on available information, likely SLL/CLL with some constitutional symptoms including weight loss, fatigue and night sweats. Adenopathy easily palpable above and below the diaphragm. Mild thrombocytopenia w/ PLT count ~120k and very mild anemia with Hg ~12 g/dl. No hypercalcemia, no evidence of immune paresis  Biopsies 4/11/22 with Dr Brannon were FNAs and non diagnostic for CLL. I do not know what IHC was obtained or whether pathologist knew what the potential diagnosis was as report only says negative for carcinoma. Will discuss with Dr Lan to add CD5, CD10, CD19, CD20 etc if enough tissue  Regardless, will also refer for axillary LN biopsy, either excisional or at the very least core. Referral sent today  PET CT 4/2022 findings are noted. Given his advanced age he was not refer for EGD. Masses in abdomen are likely matted gurinder conglomerates and I do not think it would be worth it to attempt a biopsy of those  CLL FISH  Negative , IGHV mutation status  Hep B and C negative  Uric acid mildly elevated initially, on allopurinol 300mg daily with PO hydration prior to initiation of any therapy  Given concomitant diagnosis of CLL and mantle cell lymphoma will plan to initiate treatment for mantle cell lymphoma and CLL with bendamustine rituximab.  Will consider BTK inhibitors if patient is intolerant to bendamustine/rituximab in the second-line    Plan  # plan for cycle 2 BR on 06/15/2022.  Will discuss dose reduction depending on clinical assessment at that  visit.  # follow-up in clinic on 06/15/2022 prior to cycle 2 with repeat labs.  # continue allopurinol, Valtrex and Bactrim.  # patient and his family were counseled on importance of seeking urgent medical care if you were to develop symptoms of abdominal pain, nausea, vomiting and or obstipation to rule out bowel perforation in setting of extensive lymphoma involvement around the bowel.    A total of  40 minutes were spent in review of records, interpretation of test, coordination of care, discussion and counseling with the patient.

## 2022-06-13 ENCOUNTER — TELEPHONE (OUTPATIENT)
Dept: HEMATOLOGY/ONCOLOGY | Facility: CLINIC | Age: 82
End: 2022-06-13
Payer: MEDICARE

## 2022-06-13 DIAGNOSIS — L50.9 URTICARIA: Primary | ICD-10-CM

## 2022-06-13 RX ORDER — MINERAL OIL
180 ENEMA (ML) RECTAL DAILY
Qty: 20 TABLET | Refills: 0 | Status: SHIPPED | OUTPATIENT
Start: 2022-06-13 | End: 2022-07-05

## 2022-06-13 NOTE — TELEPHONE ENCOUNTER
Spoke whith daughter, sounds like urticaria. No SOB or tongue swelling. Called in Allegra 180 mg PO qd, to be increased to BID if persistent symptoms. Daughter aware to go to the ER if symptoms of SOB or tongue swelling.     Thank you.     ----- Message from JUSTICE Johnston sent at 6/13/2022 11:51 AM CDT -----  Regarding: FW: Hives    ----- Message -----  From: Aliyah Greenberg MA  Sent: 6/13/2022   8:42 AM CDT  To: JUSTICE Johnston  Subject: Hives                                            Pt's daughter called. He broke out in hives last night and was given benadryl. She went check on him this morning and he still has hives head to toe. Do they call their PCP or do we treat?  He had his last chemo 2 weeks ago          #[phone]

## 2022-06-15 ENCOUNTER — OFFICE VISIT (OUTPATIENT)
Dept: HEMATOLOGY/ONCOLOGY | Facility: CLINIC | Age: 82
End: 2022-06-15
Payer: MEDICARE

## 2022-06-15 VITALS
HEART RATE: 73 BPM | RESPIRATION RATE: 18 BRPM | OXYGEN SATURATION: 98 % | WEIGHT: 154.88 LBS | SYSTOLIC BLOOD PRESSURE: 103 MMHG | HEIGHT: 65 IN | TEMPERATURE: 98 F | BODY MASS INDEX: 25.8 KG/M2 | DIASTOLIC BLOOD PRESSURE: 68 MMHG

## 2022-06-15 DIAGNOSIS — L50.9 URTICARIA: ICD-10-CM

## 2022-06-15 DIAGNOSIS — C83.13 MANTLE CELL LYMPHOMA OF INTRA-ABDOMINAL LYMPH NODES: Primary | ICD-10-CM

## 2022-06-15 DIAGNOSIS — Z51.11 ENCOUNTER FOR ANTINEOPLASTIC CHEMOTHERAPY: ICD-10-CM

## 2022-06-15 PROCEDURE — 99215 OFFICE O/P EST HI 40 MIN: CPT | Mod: ,,, | Performed by: STUDENT IN AN ORGANIZED HEALTH CARE EDUCATION/TRAINING PROGRAM

## 2022-06-15 PROCEDURE — 99215 PR OFFICE/OUTPT VISIT, EST, LEVL V, 40-54 MIN: ICD-10-PCS | Mod: ,,, | Performed by: STUDENT IN AN ORGANIZED HEALTH CARE EDUCATION/TRAINING PROGRAM

## 2022-06-15 RX ORDER — ALPRAZOLAM 0.5 MG/1
TABLET ORAL
COMMUNITY
End: 2022-12-05

## 2022-06-15 RX ORDER — TRAMADOL HYDROCHLORIDE 50 MG/1
1 TABLET ORAL 4 TIMES DAILY PRN
COMMUNITY
End: 2023-04-14 | Stop reason: SDUPTHER

## 2022-06-15 RX ORDER — LISINOPRIL 5 MG/1
1 TABLET ORAL DAILY
COMMUNITY
End: 2022-12-05

## 2022-06-15 RX ORDER — PREDNISONE 10 MG/1
30 TABLET ORAL DAILY
Qty: 21 TABLET | Refills: 0 | Status: SHIPPED | OUTPATIENT
Start: 2022-06-15 | End: 2022-06-22

## 2022-06-15 NOTE — PROGRESS NOTES
Chief complaint: Fatigue     Treatment history  05/18/2022:  Cycle 1 bendamustine, dose reduced by 725%.  05/25/2022 : Cycle 1 rituximab      HPI: 82 y/o M w/ PMHx of HTN, HLD, GERD, CAD, PORTER referred to [facility] for lymphocytosis and lymphadenopathy    Of note, review of his records reveals a flow cytometry sent in 2018 that was consistent with CLL. He also had a single visit with Dr Snow in 2018 but never followed back.   Patient had a PET-CT as well as a bone marrow biopsy and repeat peripheral flow cytometry with a diagnosis of CLL/SLL and mantle cell lymphoma.  Patient had symptoms of fatigue and weight loss prior to his workup and diagnosis.    Today, 06/15/2022, patient reports symptoms of hives that started over the last weekend after he had crabs the day before.  Patient reports diffuse generalized pruritus along with hives.  He denies any tongue swelling or shortness of breath.  Patient denies any new medications, over-the-counter herbs, supplements soaps, deodorants, sheets prior to his symptoms.  He denies any fevers, chills, drenching night sweats, new lumps or bumps, decreased appetite.  He reports weight gain.  Patient reports resolution of his occipital lymph adenopathy.    PMHx: HTN, HLD, GERD, CAD, PORTER  PSHx: PCI x6, lipoma, hernia  Social Hx: never smoker, no ETOH, no drugs. He reports prior exposures to asbestos and pesticides, does not know which ones  Family Hx: father: colon cancer, sister: breast cancer, colon cancer  Meds: reviewed  Allergies: NKDA    Labs:  06/14/2022:  Creatinine 0.99, calcium 9.1, , uric acid 3.7, phosphorus 3.3, potassium 4.5, WBC count 2.58, hemoglobin 12.7, platelet count 76139, ANC 1.44.  05/27/2022:  Potassium 4.9, sodium 140, creatinine 0.87, , uric acid 6.6.  05/17/2022:  Creatinine 1.03, albumin 3.3, ,  WBC count 10.32, hemoglobin 12.4, platelet count 135 1000.   05/02/2022:  Creatinine 0.93, albumin 3.6, total protein 6.4, ,  potassium 5.2, WBC count 9.6, hemoglobin 11.4, .4, platelet count 92.   4/ Cr 0.89 Alb 3.4 TP 6.4 Ca 9.4  TSH 2.0 Iron 58n TIBC 285 Ferritin 48 Folate 9.9 B12 426 Hep B s ag neg Uric acid 7.8 Hep C ab neg WBC 9.79 RBC 3.77 Hg 11.8 .5 RDW 13.4 PLT 91 ANC 2.79 ALC 6.36 Direct Ramos neg b2 micro 7.5 Copper 135 Hapto 225 Hep B core ab neg SPEP w/ ARABELLA normal Abs kappa 32 Abs lambda 124 SFLC ratio 0.26  3/18/22 Cr 1 AST 27 ALT 12 AlkPhos 87 Alb 4.1 TP 6.5 Ca 9.8 WBC 9.4 RBC 3.92 Hg 12.2 MCV 96 RDW 15.1  ALC 5.8 ANC 3  5/15/18 WBC 14.71 RBC 4.42 Hg 14.3 MCV 98.6 RDW 13  ANC 2.91 ALC 10.13 AMC 1.29    Imagin/10/22:-  right lower extremity Doppler negative for DVT      22 PET CT: multiple hypermetabolic LNs b/l neck. Right submandibular LN .7x1.7cm, SUV 7.8. Left submandibular LN 2.3x1.7cm SUV 7.2. Multiple hypermetabolic LNs in chest. Right axillary node 3.3x2cm SUV 8.1. Hypermetabolic left axillary LN SUV 6.4, 1.9x3.2cm. Hypermetabolic right paratracheal LN 2.4x1.6cm SUV 4.9. Right pulmonary hilum SUV 2.7. Subcarinal LN SUV 3.3, 1.2x3.3cm. Spleen enlarged at 15cm and demonstrates heterogeneous mild to moderate uptake SUV 5.2. Moderately intense activity in stomach and proximal duodenum with no degree of focality or discrete mass on CT. Multiple hypermetabolic LNs in abd and pelvis. Left periaortic LN SUV 6.8, 4x3.5cm. Large hypermetabolic mesenteric mass [id number] SUV 8.5. Aortocaval LN 3.5x3.8cm SUV 5.1. Hypermetabolic mass in left pelvis 5.5x7cm SUV 7. Left external iliac LN SUV 6 2.2x2.1cm. Extensive hypermetabolism anteriorly and laterally in left abdomen, associated with soft tissue thickening that is difficult to separate from bowel loops. B/l inguinal LNs up to 2.9x3.9cm, SUV 6.6    3/29/22 CT neck w/ and w/o contrast: widespread adenopathy identified in parotic glands bilateral, submandibular space, anterior and posterior cervical chains, superior mediastinum and  supraclavicular and infraclavicular spaces of the base of neck/upper chest in appearance strongly suspicious for lymphoma. Multiple subcutaneous masses are also identified in lower occipital scalp and upper neck similar to lymphadenopathy. Biopsy of prominent subcutaneous mass in right upper neck is recommended and should present the easiest location for tissue sampling    Path:  5/23/18 peripheral blood flow cytometry: CD5+ B cell population. No abnormal T cell or myeloid cell populations. CD19+, CD20+, CD5+, CD23+, CD11c+ surface kappa light chain+, CD38-, CD22+ (dim/mod), CD43+, FMC7-. Consistent with CLL    04/26/2022:  Bone marrow biopsy-marrow which Karina hematopoiesis with 10-20% B-cell infiltrate, C5 positive B-cell population with immunophenotype typical for CLL.  CLL fish within normal limits, heavy chain mutation status cannot be determined.  6q, chromosome 12, chromosome 13, chromosome 11, chromosome 17 and t( 11;14) abnormalities not detected.    05/06/2022 left axillary lymph node core biopsy:  Mantle cell lymphoma with a subset consistent with CLL/SLL., CD5 positive monoclonal B-cell population is identified, approximately 91% of total cells, immuno phenotype is nonspecific and may be seen in CLL, marginal zone lymphoma, or large cell lymphoma.  A 2nd small monoclonal B-cell population approximately 2% of the cells identified with an immunophenotype consistent with CLL.  New genomic consultation lymphoma cells positive for CD 20, CD 5, cyclin D1 and BCL2 with a TI 67 of 10%.  There is no significant staining for CD10, BCL6.  However workup consistent with diagnosis of mantle cell lymphoma.    Review of Systems     CONSTITUTIONAL: no fevers, no chills,  No weight loss, no  fatigue, no weakness  HEMATOLOGIC: no abnormal bleeding, no abnormal bruising, no drenching night sweats  ONCOLOGIC: no new masses or lumps  HEENT: no vision loss, no tinnitus or hearing loss, no nose bleeding, no dysphagia,  no odynophagia  CVS: no chest pain, no palpitations, no dyspnea on exertion  RESP: no shortness of breath, no hemoptysis, no cough  GI: no nausea, no vomiting, no diarrhea, no constipation, no melena, no hematochezia, no hematemesis, no abdominal pain, no increase in abdominal girth  : no dysuria, no hematuria, no hesitancy, no scrotal swelling, no discharge  INTEGUMENT: + rashes, no abnormal bruising, no nail pitting, no hyperpigmentation  NEURO: no falls, no memory loss, no paresthesias or dysesthesias, no urofecal incontinence or retention, no loss of strength on any extremity  MSK: no back pain, no new joint pain, no joint swelling  PSYCH: no suicidal or homicidal ideation, no depression, no insomnia, no anhedonia  ENDOCRINE: no heat or cold intolerance, no polyuria, no polydipsia      Physical Exam     Vitals:    06/15/22 0940   BP: 103/68   Pulse: 73   Resp: 18   Temp: 98 °F (36.7 °C)       GA: AAOx3, NAD  HEENT: NCAT, PERRLA, EOMI, edentulous, no oral ulcers  LYMPH: multiple palpable occipital, posterior and anterior cervical chain b/l, supraclav b/l, axillary b/l, LNs. Occipital node not palpable on today's exam  CVS: s1s2 RRR, no M/R/G, port in place in right chest wall without surrounding edema or erythema.  RESP: CTA b/l, no crackles, no wheezes or rhonchi  ABD: soft, NT, ND, BS+, no hepatomegaly, mild splenomegaly  EXT: no deformities, right lower extremity edema.  SKIN:  Diffuse erythematous maculopapular rash over bilateral lower extremities, as well as his chest and upper back.  No evidence of petechia.  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  Positive for CLL.  PET-CT with evidence of bulky lymphadenopathy, largest measuring 14 cm, spleen measuring 15  es.  Discussed with patient and his family the diagnosis, natural history and treatment options.  Discussed the risk for TLS given bulky lymphadenopathy, perforation given lymphadenopathy around the bowel loops as well as declining ECOG given his advanced age.  Continuet PJP prophylaxis with Bactrim Monday Wednesday Friday and valacyclovir with treatment.  Continue allopurinol 300 mg q.day for TLS prophylaxis.  Patient is status post cycle 1 of bendamustine and rituximab on 05/18/2022.  Patient has bendamustine was reduced by 25% and rituximab was given 1 week apart to reduce the risk of perforation.  Patient tolerated chemotherapy well without any overt adverse effects.    Patient denies any fevers or chills and is compliant with his anti-infectives.  Patient reports symptoms of diffuse hives, associated with pruritus which started Saturday after eating crabs.  Discussed his blood work including thrombocytopenia with platelet count less than 75,000.  Discussed that in the setting of an aberrant urticarial reaction to shellfish along with thrombocytopenia, will postpone his chemotherapy by 1 week  Discussed the plan to treat his attic area with prednisone 30 mg q.day for 7 days in addition to Allegra 180 mg daily.  Plan to repeat his blood work in 1 week to ensure resolution of thrombocytopenia prior to cycle 2 of bendamustine + rituximab.      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     Based on available information, likely SLL/CLL with some constitutional symptoms including weight loss, fatigue and night sweats. Adenopathy easily palpable above and below the diaphragm. Mild thrombocytopenia w/ PLT count ~120k and very mild anemia with Hg ~12 g/dl. No hypercalcemia, no evidence of immune paresis  Biopsies 4/11/22 with Dr Brannon were FNAs and non diagnostic for CLL. I do not know what IHC was obtained or whether pathologist knew what the potential diagnosis was as report  only says negative for carcinoma. Will discuss with Dr Lan to add CD5, CD10, CD19, CD20 etc if enough tissue  Regardless, will also refer for axillary LN biopsy, either excisional or at the very least core. Referral sent today  PET CT 4/2022 findings are noted. Given his advanced age he was not refer for EGD. Masses in abdomen are likely matted gurinder conglomerates and I do not think it would be worth it to attempt a biopsy of those  CLL FISH  Negative , IGHV mutation status  Hep B and C negative  Uric acid mildly elevated initially, on allopurinol 300mg daily with PO hydration prior to initiation of any therapy  Given concomitant diagnosis of CLL and mantle cell lymphoma will plan to initiate treatment for mantle cell lymphoma and CLL with bendamustine rituximab.  Will consider BTK inhibitors if patient is intolerant to bendamustine/rituximab in the second-line    Plan  # Prednisone 30 mg p.o. q.day for 7 days  # Allegra 180 mg q.day for 7 days  # Repeat CBC, CMP, LDH, uric acid, magnesium and phosphorus level in 7 days  # Follow-up in clinic on 06/22/2022 prior to cycle 2 of bendamustine plus rituximab.  # Continue allopurinol, Valtrex and Bactrim.  # Patient and his family were counseled on importance of seeking urgent medical care if you were to develop symptoms of abdominal pain, nausea, vomiting and or obstipation to rule out bowel perforation in setting of extensive lymphoma involvement around the bowel.    A total of  40 minutes were spent in review of records, interpretation of test, coordination of care, discussion and counseling with the patient.

## 2022-06-22 ENCOUNTER — OFFICE VISIT (OUTPATIENT)
Dept: HEMATOLOGY/ONCOLOGY | Facility: CLINIC | Age: 82
End: 2022-06-22
Payer: MEDICARE

## 2022-06-22 VITALS
WEIGHT: 154.31 LBS | TEMPERATURE: 98 F | HEART RATE: 67 BPM | BODY MASS INDEX: 25.68 KG/M2 | RESPIRATION RATE: 18 BRPM | DIASTOLIC BLOOD PRESSURE: 82 MMHG | SYSTOLIC BLOOD PRESSURE: 121 MMHG | OXYGEN SATURATION: 97 %

## 2022-06-22 DIAGNOSIS — C83.13 MANTLE CELL LYMPHOMA OF INTRA-ABDOMINAL LYMPH NODES: Primary | ICD-10-CM

## 2022-06-22 PROCEDURE — 99214 PR OFFICE/OUTPT VISIT, EST, LEVL IV, 30-39 MIN: ICD-10-PCS | Mod: ,,, | Performed by: INTERNAL MEDICINE

## 2022-06-22 PROCEDURE — 99214 OFFICE O/P EST MOD 30 MIN: CPT | Mod: ,,, | Performed by: INTERNAL MEDICINE

## 2022-06-22 RX ORDER — EPINEPHRINE 0.3 MG/.3ML
0.3 INJECTION SUBCUTANEOUS ONCE AS NEEDED
Status: CANCELLED | OUTPATIENT
Start: 2022-06-22

## 2022-06-22 RX ORDER — SODIUM CHLORIDE 0.9 % (FLUSH) 0.9 %
10 SYRINGE (ML) INJECTION
Status: CANCELLED | OUTPATIENT
Start: 2022-06-22

## 2022-06-22 RX ORDER — ACETAMINOPHEN 325 MG/1
650 TABLET ORAL
Status: CANCELLED | OUTPATIENT
Start: 2022-06-22

## 2022-06-22 RX ORDER — DIPHENHYDRAMINE HYDROCHLORIDE 50 MG/ML
50 INJECTION INTRAMUSCULAR; INTRAVENOUS ONCE AS NEEDED
Status: CANCELLED | OUTPATIENT
Start: 2022-06-22

## 2022-06-22 RX ORDER — FAMOTIDINE 10 MG/ML
20 INJECTION INTRAVENOUS
Status: CANCELLED | OUTPATIENT
Start: 2022-06-22

## 2022-06-22 RX ORDER — HEPARIN 100 UNIT/ML
500 SYRINGE INTRAVENOUS
Status: CANCELLED | OUTPATIENT
Start: 2022-06-22

## 2022-06-22 RX ORDER — MEPERIDINE HYDROCHLORIDE 50 MG/ML
25 INJECTION INTRAMUSCULAR; INTRAVENOUS; SUBCUTANEOUS
Status: CANCELLED | OUTPATIENT
Start: 2022-06-22

## 2022-06-22 NOTE — PROGRESS NOTES
DATE:  06/22/2022  PROBLEM:   Mixed hematological/lymph 0 reticular malignancy including;  1. )  CLL/SLL  2. )  Mantle cell lymphoma     HPI: 82 y/o M w/ PMHx of HTN, HLD, GERD, CAD, PORTER referred to [facility] for lymphocytosis and lymphadenopathy    Of note, review of his records reveals a flow cytometry sent in 2018 that was consistent with CLL. He also had a single visit with Dr Snow in 2018 but never followed back.   Patient had a PET-CT as well as a bone marrow biopsy and repeat peripheral flow cytometry with a diagnosis of CLL/SLL and mantle cell lymphoma.  Patient had symptoms of fatigue and weight loss prior to his workup and diagnosis.    Today, 06/15/2022, patient reports symptoms of hives that started over the last weekend after he had crabs the day before.  Patient reports diffuse generalized pruritus along with hives.  He denies any tongue swelling or shortness of breath.  Patient denies any new medications, over-the-counter herbs, supplements soaps, deodorants, sheets prior to his symptoms.  He denies any fevers, chills, drenching night sweats, new lumps or bumps, decreased appetite.  He reports weight gain.  Patient reports resolution of his occipital lymph adenopathy.    PMHx: HTN, HLD, GERD, CAD, PORTER  PSHx: PCI x6, lipoma, hernia  Social Hx: never smoker, no ETOH, no drugs. He reports prior exposures to asbestos and pesticides, does not know which ones  Family Hx: father: colon cancer, sister: breast cancer, colon cancer  Meds: reviewed  Allergies: NKDA    Labs:  06/21/2022 WBC at 2.58 bed neutrophil adequate at 55%.  Hemoglobin 12.7, hematocrit 39.4.  06/14/2022:  Creatinine 0.99, calcium 9.1, , uric acid 3.7, phosphorus 3.3, potassium 4.5, WBC count 2.58, hemoglobin 12.7, platelet count 01710, ANC 1.44.  05/27/2022:  Potassium 4.9, sodium 140, creatinine 0.87, , uric acid 6.6.  05/17/2022:  Creatinine 1.03, albumin 3.3, ,  WBC count 10.32, hemoglobin 12.4, platelet count 135  1000.   2022:  Creatinine 0.93, albumin 3.6, total protein 6.4, , potassium 5.2, WBC count 9.6, hemoglobin 11.4, .4, platelet count 92.   4 Cr 0.89 Alb 3.4 TP 6.4 Ca 9.4  TSH 2.0 Iron 58n TIBC 285 Ferritin 48 Folate 9.9 B12 426 Hep B s ag neg Uric acid 7.8 Hep C ab neg WBC 9.79 RBC 3.77 Hg 11.8 .5 RDW 13.4 PLT 91 ANC 2.79 ALC 6.36 Direct Ramos neg b2 micro 7.5 Copper 135 Hapto 225 Hep B core ab neg SPEP w/ ARABELLA normal Abs kappa 32 Abs lambda 124 SFLC ratio 0.26  3/18/22 Cr 1 AST 27 ALT 12 AlkPhos 87 Alb 4.1 TP 6.5 Ca 9.8 WBC 9.4 RBC 3.92 Hg 12.2 MCV 96 RDW 15.1  ALC 5.8 ANC 3  5/15/18 WBC 14.71 RBC 4.42 Hg 14.3 MCV 98.6 RDW 13  ANC 2.91 ALC 10.13 AMC 1.29    Imagin/10/22:-  right lower extremity Doppler negative for DVT      22 PET CT: multiple hypermetabolic LNs b/l neck. Right submandibular LN .7x1.7cm, SUV 7.8. Left submandibular LN 2.3x1.7cm SUV 7.2. Multiple hypermetabolic LNs in chest. Right axillary node 3.3x2cm SUV 8.1. Hypermetabolic left axillary LN SUV 6.4, 1.9x3.2cm. Hypermetabolic right paratracheal LN 2.4x1.6cm SUV 4.9. Right pulmonary hilum SUV 2.7. Subcarinal LN SUV 3.3, 1.2x3.3cm. Spleen enlarged at 15cm and demonstrates heterogeneous mild to moderate uptake SUV 5.2. Moderately intense activity in stomach and proximal duodenum with no degree of focality or discrete mass on CT. Multiple hypermetabolic LNs in abd and pelvis. Left periaortic LN SUV 6.8, 4x3.5cm. Large hypermetabolic mesenteric mass [id number] SUV 8.5. Aortocaval LN 3.5x3.8cm SUV 5.1. Hypermetabolic mass in left pelvis 5.5x7cm SUV 7. Left external iliac LN SUV 6 2.2x2.1cm. Extensive hypermetabolism anteriorly and laterally in left abdomen, associated with soft tissue thickening that is difficult to separate from bowel loops. B/l inguinal LNs up to 2.9x3.9cm, SUV 6.6    3/29/22 CT neck w/ and w/o contrast: widespread adenopathy identified in parotic glands bilateral,  submandibular space, anterior and posterior cervical chains, superior mediastinum and supraclavicular and infraclavicular spaces of the base of neck/upper chest in appearance strongly suspicious for lymphoma. Multiple subcutaneous masses are also identified in lower occipital scalp and upper neck similar to lymphadenopathy. Biopsy of prominent subcutaneous mass in right upper neck is recommended and should present the easiest location for tissue sampling    Path:  5/23/18 peripheral blood flow cytometry: CD5+ B cell population. No abnormal T cell or myeloid cell populations. CD19+, CD20+, CD5+, CD23+, CD11c+ surface kappa light chain+, CD38-, CD22+ (dim/mod), CD43+, FMC7-. Consistent with CLL    04/26/2022:  Bone marrow biopsy-marrow which Karina hematopoiesis with 10-20% B-cell infiltrate, C5 positive B-cell population with immunophenotype typical for CLL.  CLL fish within normal limits, heavy chain mutation status cannot be determined.  6q, chromosome 12, chromosome 13, chromosome 11, chromosome 17 and t( 11;14) abnormalities not detected.    05/06/2022 left axillary lymph node core biopsy:  Mantle cell lymphoma with a subset consistent with CLL/SLL., CD5 positive monoclonal B-cell population is identified, approximately 91% of total cells, immuno phenotype is nonspecific and may be seen in CLL, marginal zone lymphoma, or large cell lymphoma.  A 2nd small monoclonal B-cell population approximately 2% of the cells identified with an immunophenotype consistent with CLL.  New genomic consultation lymphoma cells positive for CD 20, CD 5, cyclin D1 and BCL2 with a TI 67 of 10%.  There is no significant staining for CD10, BCL6.  However workup consistent with diagnosis of mantle cell lymphoma.    INTERVAL Hx:  06/22/2022  Patient feels quite well at this time.  Prior rash that he had after 1st round of treatment has completely resolved.  He denies any B symptoms and notes no peripheral palpable  adenopathy.    ROS  CONSTITUTIONAL: no fevers, no chills,  No weight loss, no  fatigue, no weakness  HEMATOLOGIC: no abnormal bleeding, no abnormal bruising, no drenching night sweats  ONCOLOGIC: no new masses or lumps  HEENT: no vision loss, no tinnitus or hearing loss, no nose bleeding, no dysphagia, no odynophagia  CVS: no chest pain, no palpitations, no dyspnea on exertion  RESP: no shortness of breath, no hemoptysis, no cough  GI: no nausea, no vomiting, no diarrhea, no constipation, no melena, no hematochezia, no hematemesis, no abdominal pain, no increase in abdominal girth  : no dysuria, no hematuria, no hesitancy, no scrotal swelling, no discharge  INTEGUMENT: + rashes, no abnormal bruising, no nail pitting, no hyperpigmentation  NEURO: no falls, no memory loss, no paresthesias or dysesthesias, no urofecal incontinence or retention, no loss of strength on any extremity  MSK: no back pain, no new joint pain, no joint swelling  PSYCH: no suicidal or homicidal ideation, no depression, no insomnia, no anhedonia  ENDOCRINE: no heat or cold intolerance, no polyuria, no polydipsia      Physical Exam     Vitals:    06/22/22 0814   BP: 121/82   Pulse: 67   Resp: 18   Temp: 98.1 °F (36.7 °C)       GEN: AAOx3, NAD  VS:  Afebrile, normotensive  HEENT: PERRLA, EOMI, edentulous, no oral ulcers  NECK:  Supple, full range of motion, without bruits thyromegaly  LYMPH: multiple palpable occipital, posterior and anterior cervical chain b/l, supraclav b/l, axillary b/l, LNs. Occipital node not palpable on today's exam  BACK:  Without spinal or CVAT  CVS: s1s2 RRR, no M/R/G, port in place in right chest wall without surrounding edema or erythema.  RESP: CTA b/l, no crackles, no wheezes or rhonchi  ABD: soft, NT, ND, BS+, no hepatomegaly, mild splenomegaly  EXT: no deformities, right lower extremity edema.  SKIN:  Diffuse erythematous maculopapular rash over bilateral lower extremities, as well as his chest and upper back.  No  evidence of petechia.  NEURO: normal mentation, strength 5/5 on all 4 extremities, no sensory deficits  PSYCH:  No overt features suggesting depression or anxiety    ASSESSMENT:  1.)  Mantle cell lymphoma, [place]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  Positive for CLL.  PET-CT with evidence of bulky lymphadenopathy, largest measuring 14 cm, spleen measuring 15 cm.  Discussed with patient and his family the diagnosis, natural history and treatment options.  Discussed the risk for TLS given bulky lymphadenopathy, perforation given lymphadenopathy around the bowel loops as well as declining ECOG given his advanced age.  Continuet PJP prophylaxis with Bactrim Monday Wednesday Friday and valacyclovir with treatment.  Continue allopurinol 300 mg q.day for TLS prophylaxis.  Patient is status post cycle 1 of bendamustine and rituximab on 05/18/2022.  Patient has bendamustine was reduced by 25% and rituximab was given 1 week apart to reduce the risk of perforation.  Patient tolerated chemotherapy well without any overt adverse effects.    Patient denies any fevers or chills and is compliant with his anti-infectives.  Patient reports symptoms of diffuse hives, associated with pruritus which started Saturday after eating crabs.  Discussed his blood work including thrombocytopenia with platelet count less than 75,000.  Discussed that in the setting of an aberrant urticarial reaction to shellfish along with thrombocytopenia, will postpone his chemotherapy by 1 week  Discussed the plan to treat his attic area with prednisone 30 mg q.day for 7 days in addition to Allegra 180 mg daily.  Plan to repeat his blood work in 1 week to ensure resolution of thrombocytopenia prior to cycle 2 of bendamustine + rituximab.      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     Based on available information, likely  SLL/CLL with some constitutional symptoms including weight loss, fatigue and night sweats. Adenopathy easily palpable above and below the diaphragm. Mild thrombocytopenia w/ PLT count ~120k and very mild anemia with Hg ~12 g/dl. No hypercalcemia, no evidence of immune paresis  Biopsies 4/11/22 with Dr Brannon were FNAs and non diagnostic for CLL. I do not know what IHC was obtained or whether pathologist knew what the potential diagnosis was as report only says negative for carcinoma. Will discuss with Dr Lan to add CD5, CD10, CD19, CD20 etc if enough tissue  Regardless, will also refer for axillary LN biopsy, either excisional or at the very least core. Referral sent today  PET CT 4/2022 findings are noted. Given his advanced age he was not refer for EGD. Masses in abdomen are likely matted gurinder conglomerates and I do not think it would be worth it to attempt a biopsy of those  CLL FISH  Negative , IGHV mutation status  Hep B and C negative  Allopurinol 300mg daily with PO hydration prior to initiation of any therapy  Given concomitant diagnosis of CLL and mantle cell lymphoma will plan to initiate treatment for mantle cell lymphoma and CLL with bendamustine rituximab.  Will consider BTK inhibitors if patient is intolerant to bendamustine/rituximab in the second-line    Plan  Cycle 2. Of bendamustine (day 1+ day 2) in conjunction with rituximab.  Continue allopurinol for now.  Monitor for repeat rash, if it recurs, offer patient Allegra and prednisone which appeared to work nicely when he had the rash after the 1st round of treatment.  Next follow-up treatment will be in 3 weeks.  Check CBC, CMP, on RTC.  Continue prophylactic Valtrex and Bactrim.  Display getting follow-up restaging scans after cycle 3..      DORI JENSEN M.D., FACP

## 2022-07-20 ENCOUNTER — OFFICE VISIT (OUTPATIENT)
Dept: HEMATOLOGY/ONCOLOGY | Facility: CLINIC | Age: 82
End: 2022-07-20
Payer: MEDICARE

## 2022-07-20 VITALS
WEIGHT: 164.69 LBS | HEART RATE: 72 BPM | TEMPERATURE: 97 F | OXYGEN SATURATION: 98 % | HEIGHT: 66 IN | BODY MASS INDEX: 26.47 KG/M2 | DIASTOLIC BLOOD PRESSURE: 76 MMHG | SYSTOLIC BLOOD PRESSURE: 120 MMHG | RESPIRATION RATE: 18 BRPM

## 2022-07-20 DIAGNOSIS — C83.13 MANTLE CELL LYMPHOMA OF INTRA-ABDOMINAL LYMPH NODES: Primary | ICD-10-CM

## 2022-07-20 DIAGNOSIS — Z51.11 ENCOUNTER FOR ANTINEOPLASTIC CHEMOTHERAPY: ICD-10-CM

## 2022-07-20 PROCEDURE — 99215 PR OFFICE/OUTPT VISIT, EST, LEVL V, 40-54 MIN: ICD-10-PCS | Mod: ,,, | Performed by: STUDENT IN AN ORGANIZED HEALTH CARE EDUCATION/TRAINING PROGRAM

## 2022-07-20 PROCEDURE — 99215 OFFICE O/P EST HI 40 MIN: CPT | Mod: ,,, | Performed by: STUDENT IN AN ORGANIZED HEALTH CARE EDUCATION/TRAINING PROGRAM

## 2022-07-20 RX ORDER — EPINEPHRINE 0.3 MG/.3ML
0.3 INJECTION SUBCUTANEOUS ONCE AS NEEDED
Status: CANCELLED | OUTPATIENT
Start: 2022-07-20

## 2022-07-20 RX ORDER — HEPARIN 100 UNIT/ML
500 SYRINGE INTRAVENOUS
Status: CANCELLED | OUTPATIENT
Start: 2022-07-20

## 2022-07-20 RX ORDER — DIPHENHYDRAMINE HYDROCHLORIDE 50 MG/ML
50 INJECTION INTRAMUSCULAR; INTRAVENOUS ONCE AS NEEDED
Status: CANCELLED | OUTPATIENT
Start: 2022-07-21

## 2022-07-20 RX ORDER — SODIUM CHLORIDE 0.9 % (FLUSH) 0.9 %
10 SYRINGE (ML) INJECTION
Status: CANCELLED | OUTPATIENT
Start: 2022-07-20

## 2022-07-20 RX ORDER — HEPARIN 100 UNIT/ML
500 SYRINGE INTRAVENOUS
Status: CANCELLED | OUTPATIENT
Start: 2022-07-21

## 2022-07-20 RX ORDER — EPINEPHRINE 0.3 MG/.3ML
0.3 INJECTION SUBCUTANEOUS ONCE AS NEEDED
Status: CANCELLED | OUTPATIENT
Start: 2022-07-21

## 2022-07-20 RX ORDER — FAMOTIDINE 10 MG/ML
20 INJECTION INTRAVENOUS
Status: CANCELLED | OUTPATIENT
Start: 2022-07-20

## 2022-07-20 RX ORDER — DIPHENHYDRAMINE HYDROCHLORIDE 50 MG/ML
50 INJECTION INTRAMUSCULAR; INTRAVENOUS ONCE AS NEEDED
Status: CANCELLED | OUTPATIENT
Start: 2022-07-20

## 2022-07-20 RX ORDER — SODIUM CHLORIDE 0.9 % (FLUSH) 0.9 %
10 SYRINGE (ML) INJECTION
Status: CANCELLED | OUTPATIENT
Start: 2022-07-21

## 2022-07-20 RX ORDER — SULFAMETHOXAZOLE AND TRIMETHOPRIM 800; 160 MG/1; MG/1
1 TABLET ORAL
Qty: 30 TABLET | Refills: 3 | Status: SHIPPED | OUTPATIENT
Start: 2022-07-20 | End: 2023-04-03 | Stop reason: SDUPTHER

## 2022-07-20 RX ORDER — MEPERIDINE HYDROCHLORIDE 50 MG/ML
25 INJECTION INTRAMUSCULAR; INTRAVENOUS; SUBCUTANEOUS
Status: CANCELLED | OUTPATIENT
Start: 2022-07-20

## 2022-07-20 RX ORDER — ACETAMINOPHEN 325 MG/1
650 TABLET ORAL
Status: CANCELLED | OUTPATIENT
Start: 2022-07-20

## 2022-07-20 NOTE — PROGRESS NOTES
DATE:  06/22/2022  PROBLEM:   Mixed hematological/lymph 0 reticular malignancy including;  1. )  CLL/SLL  2. )  Mantle cell lymphoma     HPI: 82 y/o M w/ PMHx of HTN, HLD, GERD, CAD, PORTER referred to [facility] for lymphocytosis and lymphadenopathy    Of note, review of his records reveals a flow cytometry sent in 2018 that was consistent with CLL. He also had a single visit with Dr Snow in 2018 but never followed back.   Patient had a PET-CT as well as a bone marrow biopsy and repeat peripheral flow cytometry with a diagnosis of CLL/SLL and mantle cell lymphoma.  Patient had symptoms of fatigue and weight loss prior to his workup and diagnosis.    Today, 07/20/2022, patient reports tolerating his cycle 2 well without significant  Side effects despite increased dose.  He denies any symptoms of fatigue, decreased appetite, new lumps or bumps, drenching night sweats, fevers or chills.  He denies any new medications.  He denies any symptoms of clinical bleeding.  He reports independence with his ADLs and IADLs, ECOG 0-1.    PMHx: HTN, HLD, GERD, CAD, PORTER  PSHx: PCI x6, lipoma, hernia  Social Hx: never smoker, no ETOH, no drugs. He reports prior exposures to asbestos and pesticides, does not know which ones  Family Hx: father: colon cancer, sister: breast cancer, colon cancer  Meds: reviewed  Allergies: NKDA    Labs:  07/19/2022:  WBC 3.75, hemoglobin 13.1, .5, platelet count 53185, ANC 2.32, uric acid 4.1, , creatinine 1.09, albumin 3.5, calcium 9.6, LFTs within normal limits.  06/21/2022 WBC at 2.58 bed neutrophil adequate at 55%.  Hemoglobin 12.7, hematocrit 39.4.  06/14/2022:  Creatinine 0.99, calcium 9.1, , uric acid 3.7, phosphorus 3.3, potassium 4.5, WBC count 2.58, hemoglobin 12.7, platelet count 98805, ANC 1.44.  05/27/2022:  Potassium 4.9, sodium 140, creatinine 0.87, , uric acid 6.6.  05/17/2022:  Creatinine 1.03, albumin 3.3, ,  WBC count 10.32, hemoglobin 12.4, platelet  count 135 1000.   2022:  Creatinine 0.93, albumin 3.6, total protein 6.4, , potassium 5.2, WBC count 9.6, hemoglobin 11.4, .4, platelet count 92.   4 Cr 0.89 Alb 3.4 TP 6.4 Ca 9.4  TSH 2.0 Iron 58n TIBC 285 Ferritin 48 Folate 9.9 B12 426 Hep B s ag neg Uric acid 7.8 Hep C ab neg WBC 9.79 RBC 3.77 Hg 11.8 .5 RDW 13.4 PLT 91 ANC 2.79 ALC 6.36 Direct Ramos neg b2 micro 7.5 Copper 135 Hapto 225 Hep B core ab neg SPEP w/ ARABELLA normal Abs kappa 32 Abs lambda 124 SFLC ratio 0.26  3/18/22 Cr 1 AST 27 ALT 12 AlkPhos 87 Alb 4.1 TP 6.5 Ca 9.8 WBC 9.4 RBC 3.92 Hg 12.2 MCV 96 RDW 15.1  ALC 5.8 ANC 3  5/15/18 WBC 14.71 RBC 4.42 Hg 14.3 MCV 98.6 RDW 13  ANC 2.91 ALC 10.13 AMC 1.29    Imagin/10/22:-  right lower extremity Doppler negative for DVT      22 PET CT: multiple hypermetabolic LNs b/l neck. Right submandibular LN .7x1.7cm, SUV 7.8. Left submandibular LN 2.3x1.7cm SUV 7.2. Multiple hypermetabolic LNs in chest. Right axillary node 3.3x2cm SUV 8.1. Hypermetabolic left axillary LN SUV 6.4, 1.9x3.2cm. Hypermetabolic right paratracheal LN 2.4x1.6cm SUV 4.9. Right pulmonary hilum SUV 2.7. Subcarinal LN SUV 3.3, 1.2x3.3cm. Spleen enlarged at 15cm and demonstrates heterogeneous mild to moderate uptake SUV 5.2. Moderately intense activity in stomach and proximal duodenum with no degree of focality or discrete mass on CT. Multiple hypermetabolic LNs in abd and pelvis. Left periaortic LN SUV 6.8, 4x3.5cm. Large hypermetabolic mesenteric mass [id number] SUV 8.5. Aortocaval LN 3.5x3.8cm SUV 5.1. Hypermetabolic mass in left pelvis 5.5x7cm SUV 7. Left external iliac LN SUV 6 2.2x2.1cm. Extensive hypermetabolism anteriorly and laterally in left abdomen, associated with soft tissue thickening that is difficult to separate from bowel loops. B/l inguinal LNs up to 2.9x3.9cm, SUV 6.6    3/29/22 CT neck w/ and w/o contrast: widespread adenopathy identified in parotic glands  bilateral, submandibular space, anterior and posterior cervical chains, superior mediastinum and supraclavicular and infraclavicular spaces of the base of neck/upper chest in appearance strongly suspicious for lymphoma. Multiple subcutaneous masses are also identified in lower occipital scalp and upper neck similar to lymphadenopathy. Biopsy of prominent subcutaneous mass in right upper neck is recommended and should present the easiest location for tissue sampling    Path:  5/23/18 peripheral blood flow cytometry: CD5+ B cell population. No abnormal T cell or myeloid cell populations. CD19+, CD20+, CD5+, CD23+, CD11c+ surface kappa light chain+, CD38-, CD22+ (dim/mod), CD43+, FMC7-. Consistent with CLL    04/26/2022:  Bone marrow biopsy-marrow -trilineage hematopoiesis with 10-20% B-cell infiltrate, C5 positive B-cell population with immunophenotype typical for CLL.  CLL fish within normal limits, heavy chain mutation status cannot be determined.  6q, chromosome 12, chromosome 13, chromosome 11, chromosome 17 and t( 11;14) abnormalities not detected.    05/06/2022 left axillary lymph node core biopsy:  Mantle cell lymphoma with a subset consistent with CLL/SLL., CD5 positive monoclonal B-cell population is identified, approximately 91% of total cells, immuno phenotype is nonspecific and may be seen in CLL, marginal zone lymphoma, or large cell lymphoma.  A 2nd small monoclonal B-cell population approximately 2% of the cells identified with an immunophenotype consistent with CLL.  New genomic consultation lymphoma cells positive for CD 20, CD 5, cyclin D1 and BCL2 with a TI 67 of 10%.  There is no significant staining for CD10, BCL6.  However workup consistent with diagnosis of mantle cell lymphoma.        ROS  CONSTITUTIONAL: no fevers, no chills,  No weight loss, no  fatigue, no weakness  HEMATOLOGIC: no abnormal bleeding, no abnormal bruising, no drenching night sweats  ONCOLOGIC: no new masses or  lumps  HEENT: no vision loss, no tinnitus or hearing loss, no nose bleeding, no dysphagia, no odynophagia  CVS: no chest pain, no palpitations, no dyspnea on exertion  RESP: no shortness of breath, no hemoptysis, no cough  GI: no nausea, no vomiting, no diarrhea, no constipation, no melena, no hematochezia, no hematemesis, no abdominal pain, no increase in abdominal girth  : no dysuria, no hematuria, no hesitancy, no scrotal swelling, no discharge  INTEGUMENT: no rashes, no abnormal bruising, no nail pitting, no hyperpigmentation  NEURO: no falls, no memory loss, no paresthesias or dysesthesias, no urofecal incontinence or retention, no loss of strength on any extremity  MSK: no back pain, no new joint pain, no joint swelling  PSYCH: no suicidal or homicidal ideation, no depression, no insomnia, no anhedonia  ENDOCRINE: no heat or cold intolerance, no polyuria, no polydipsia      Physical Exam     Vitals:    07/20/22 1003   BP: 120/76   Pulse: 72   Resp: 18   Temp: 97.2 °F (36.2 °C)       GEN: AAOx3, NAD  VS:  Afebrile, normotensive  HEENT: PERRLA, EOMI, edentulous, no oral ulcers  NECK:  Supple, full range of motion, without bruits thyromegaly  LYMPH:  No axillary, supra clavicle or cervical adenopathy appreciated on today's exam.  CVS: s1s2 RRR, no M/R/G, port in place in right chest wall without surrounding edema or erythema.  RESP: CTA b/l, no crackles, no wheezes or rhonchi  ABD: soft, NT, ND, BS+, no hepatomegaly, mild splenomegaly  EXT: no deformities, right lower extremity edema.  SKIN:  No rash,.  No evidence of petechia.  NEURO: normal mentation, strength 5/5 on all 4 extremities, no sensory deficits  PSYCH:  No overt features suggesting depression or anxiety    ASSESSMENT:    1.)  Mantle cell lymphoma, [place]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  Positive for CLL.  PET-CT with evidence of bulky  lymphadenopathy, largest measuring 14 cm, spleen measuring 15 cm.  Discussed with patient and his family the diagnosis, natural history and treatment options.  Discussed the risk for TLS given bulky lymphadenopathy, perforation given lymphadenopathy around the bowel loops as well as declining ECOG given his advanced age.  Continuet PJP prophylaxis with Bactrim Monday Wednesday Friday and valacyclovir with treatment.  Continue allopurinol 300 mg q.day for TLS prophylaxis.  Patient is status post cycle 1 of bendamustine and rituximab on 05/18/2022.  Patient has bendamustine was reduced by 25% and rituximab was given 1 week apart to reduce the risk of perforation.  S/p cycle 2 without significant side effects, dosed for bendamustine increased to 100%.  With plan to proceed with cycle 3 full dose today.  Reviewed labs noted platelet count 47226, ANC more than 1000.   Patient tolerated chemotherapy well without any overt adverse effects.        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     Based on available information, likely SLL/CLL with some constitutional symptoms including weight loss, fatigue and night sweats. Adenopathy easily palpable above and below the diaphragm. Mild thrombocytopenia w/ PLT count ~120k and very mild anemia with Hg ~12 g/dl. No hypercalcemia, no evidence of immune paresis  Biopsies 4/11/22 with Dr Brannon were FNAs and non diagnostic for CLL. I do not know what IHC was obtained or whether pathologist knew what the potential diagnosis was as report only says negative for carcinoma. Will discuss with Dr Lan to add CD5, CD10, CD19, CD20 etc if enough tissue  Regardless, will also refer for axillary LN biopsy, either excisional or at the very least core. Referral sent today  PET CT 4/2022 findings are noted. Given his advanced age he was not refer for EGD. Masses in abdomen are likely matted gurinder conglomerates and I do not think it would be worth it to attempt a biopsy  of those  CLL FISH  Negative , IGHV mutation status  Hep B and C negative  Allopurinol 300mg daily with PO hydration prior to initiation of any therapy  Given concomitant diagnosis of CLL and mantle cell lymphoma will plan to initiate treatment for mantle cell lymphoma and CLL with bendamustine rituximab.  Will consider BTK inhibitors if patient is intolerant to bendamustine/rituximab in the second-line    Plan  Proceed with cycle 3 bendamustine rituximab today  Plan to follow-up in clinic in 4 weeks prior to cycle 4 with repeat labs.  Continue valacyclovir and Bactrim

## 2022-08-17 ENCOUNTER — OFFICE VISIT (OUTPATIENT)
Dept: HEMATOLOGY/ONCOLOGY | Facility: CLINIC | Age: 82
End: 2022-08-17
Payer: MEDICARE

## 2022-08-17 VITALS
HEART RATE: 67 BPM | SYSTOLIC BLOOD PRESSURE: 128 MMHG | RESPIRATION RATE: 18 BRPM | DIASTOLIC BLOOD PRESSURE: 59 MMHG | HEIGHT: 65 IN | TEMPERATURE: 98 F | OXYGEN SATURATION: 100 % | BODY MASS INDEX: 27.56 KG/M2 | WEIGHT: 165.38 LBS

## 2022-08-17 DIAGNOSIS — Z51.11 ENCOUNTER FOR ANTINEOPLASTIC CHEMOTHERAPY: ICD-10-CM

## 2022-08-17 DIAGNOSIS — C83.13 MANTLE CELL LYMPHOMA OF INTRA-ABDOMINAL LYMPH NODES: Primary | ICD-10-CM

## 2022-08-17 PROCEDURE — 99215 PR OFFICE/OUTPT VISIT, EST, LEVL V, 40-54 MIN: ICD-10-PCS | Mod: ,,, | Performed by: STUDENT IN AN ORGANIZED HEALTH CARE EDUCATION/TRAINING PROGRAM

## 2022-08-17 PROCEDURE — 99215 OFFICE O/P EST HI 40 MIN: CPT | Mod: ,,, | Performed by: STUDENT IN AN ORGANIZED HEALTH CARE EDUCATION/TRAINING PROGRAM

## 2022-08-17 RX ORDER — ACETAMINOPHEN 325 MG/1
650 TABLET ORAL
Status: CANCELLED | OUTPATIENT
Start: 2022-08-17

## 2022-08-17 RX ORDER — SODIUM CHLORIDE 0.9 % (FLUSH) 0.9 %
10 SYRINGE (ML) INJECTION
Status: CANCELLED | OUTPATIENT
Start: 2022-08-18

## 2022-08-17 RX ORDER — FAMOTIDINE 10 MG/ML
20 INJECTION INTRAVENOUS
Status: CANCELLED | OUTPATIENT
Start: 2022-08-17

## 2022-08-17 RX ORDER — SODIUM CHLORIDE 0.9 % (FLUSH) 0.9 %
10 SYRINGE (ML) INJECTION
Status: CANCELLED | OUTPATIENT
Start: 2022-08-17

## 2022-08-17 RX ORDER — EPINEPHRINE 0.3 MG/.3ML
0.3 INJECTION SUBCUTANEOUS ONCE AS NEEDED
Status: CANCELLED | OUTPATIENT
Start: 2022-08-18

## 2022-08-17 RX ORDER — DIPHENHYDRAMINE HYDROCHLORIDE 50 MG/ML
50 INJECTION INTRAMUSCULAR; INTRAVENOUS ONCE AS NEEDED
Status: CANCELLED | OUTPATIENT
Start: 2022-08-17

## 2022-08-17 RX ORDER — HEPARIN 100 UNIT/ML
500 SYRINGE INTRAVENOUS
Status: CANCELLED | OUTPATIENT
Start: 2022-08-17

## 2022-08-17 RX ORDER — EPINEPHRINE 0.3 MG/.3ML
0.3 INJECTION SUBCUTANEOUS ONCE AS NEEDED
Status: CANCELLED | OUTPATIENT
Start: 2022-08-17

## 2022-08-17 RX ORDER — MEPERIDINE HYDROCHLORIDE 50 MG/ML
25 INJECTION INTRAMUSCULAR; INTRAVENOUS; SUBCUTANEOUS
Status: CANCELLED | OUTPATIENT
Start: 2022-08-17

## 2022-08-17 RX ORDER — DIPHENHYDRAMINE HYDROCHLORIDE 50 MG/ML
50 INJECTION INTRAMUSCULAR; INTRAVENOUS ONCE AS NEEDED
Status: CANCELLED | OUTPATIENT
Start: 2022-08-18

## 2022-08-17 RX ORDER — HEPARIN 100 UNIT/ML
500 SYRINGE INTRAVENOUS
Status: CANCELLED | OUTPATIENT
Start: 2022-08-18

## 2022-08-17 NOTE — PROGRESS NOTES
DATE:  06/22/2022  PROBLEM:   Mixed hematological/lymph 0 reticular malignancy including;  1. )  CLL/SLL  2. )  Mantle cell lymphoma     HPI: 82 y/o M w/ PMHx of HTN, HLD, GERD, CAD, PORTER referred to [facility] for lymphocytosis and lymphadenopathy    Of note, review of his records reveals a flow cytometry sent in 2018 that was consistent with CLL. He also had a single visit with Dr Snow in 2018 but never followed back.   Patient had a PET-CT as well as a bone marrow biopsy and repeat peripheral flow cytometry with a diagnosis of CLL/SLL and mantle cell lymphoma.  Patient had symptoms of fatigue and weight loss prior to his workup and diagnosis.    Today, 08/17/2022, patient denies any acute concerns since his last follow-up visit with us.  He reports tolerating his treatment very well without any side effects.  He reports improvement in his symptoms of epigastric pain.  He denies any new lumps or bumps, fevers, chills, drenching night sweats.  He reports an excellent appetite and denies any weight loss.  He remains independent with his ADLs and IADLs and is looking forward to start working after completion of his treatments.    PMHx: HTN, HLD, GERD, CAD, PORTER  PSHx: PCI x6, lipoma, hernia  Social Hx: never smoker, no ETOH, no drugs. He reports prior exposures to asbestos and pesticides, does not know which ones  Family Hx: father: colon cancer, sister: breast cancer, colon cancer  Meds: reviewed  Allergies: NKDA    Labs:  08/16/2022:  Creatinine 0.81, albumin 3.8, calcium 9.2, total bili 0.5, LFTs within normal limits, uric acid 3.3, , WBC 3.45, hemoglobin 13.7, .8, platelet count 94106, ANC 2.06.  07/19/2022:  WBC 3.75, hemoglobin 13.1, .5, platelet count 96004, ANC 2.32, uric acid 4.1, , creatinine 1.09, albumin 3.5, calcium 9.6, LFTs within normal limits.  06/21/2022 WBC at 2.58 bed neutrophil adequate at 55%.  Hemoglobin 12.7, hematocrit 39.4.  06/14/2022:  Creatinine 0.99, calcium  9.1, , uric acid 3.7, phosphorus 3.3, potassium 4.5, WBC count 2.58, hemoglobin 12.7, platelet count 55351, ANC 1.44.  2022:  Potassium 4.9, sodium 140, creatinine 0.87, , uric acid 6.6.  2022:  Creatinine 1.03, albumin 3.3, ,  WBC count 10.32, hemoglobin 12.4, platelet count 135 1000.   2022:  Creatinine 0.93, albumin 3.6, total protein 6.4, , potassium 5.2, WBC count 9.6, hemoglobin 11.4, .4, platelet count 92.   4/ Cr 0.89 Alb 3.4 TP 6.4 Ca 9.4  TSH 2.0 Iron 58n TIBC 285 Ferritin 48 Folate 9.9 B12 426 Hep B s ag neg Uric acid 7.8 Hep C ab neg WBC 9.79 RBC 3.77 Hg 11.8 .5 RDW 13.4 PLT 91 ANC 2.79 ALC 6.36 Direct Ramos neg b2 micro 7.5 Copper 135 Hapto 225 Hep B core ab neg SPEP w/ ARABELLA normal Abs kappa 32 Abs lambda 124 SFLC ratio 0.26  3/18/22 Cr 1 AST 27 ALT 12 AlkPhos 87 Alb 4.1 TP 6.5 Ca 9.8 WBC 9.4 RBC 3.92 Hg 12.2 MCV 96 RDW 15.1  ALC 5.8 ANC 3  5/15/18 WBC 14.71 RBC 4.42 Hg 14.3 MCV 98.6 RDW 13  ANC 2.91 ALC 10.13 AMC 1.29    Imagin/10/22:-  right lower extremity Doppler negative for DVT      22 PET CT: multiple hypermetabolic LNs b/l neck. Right submandibular LN .7x1.7cm, SUV 7.8. Left submandibular LN 2.3x1.7cm SUV 7.2. Multiple hypermetabolic LNs in chest. Right axillary node 3.3x2cm SUV 8.1. Hypermetabolic left axillary LN SUV 6.4, 1.9x3.2cm. Hypermetabolic right paratracheal LN 2.4x1.6cm SUV 4.9. Right pulmonary hilum SUV 2.7. Subcarinal LN SUV 3.3, 1.2x3.3cm. Spleen enlarged at 15cm and demonstrates heterogeneous mild to moderate uptake SUV 5.2. Moderately intense activity in stomach and proximal duodenum with no degree of focality or discrete mass on CT. Multiple hypermetabolic LNs in abd and pelvis. Left periaortic LN SUV 6.8, 4x3.5cm. Large hypermetabolic mesenteric mass [id number] SUV 8.5. Aortocaval LN 3.5x3.8cm SUV 5.1. Hypermetabolic mass in left pelvis 5.5x7cm SUV 7. Left external iliac LN SUV 6  2.2x2.1cm. Extensive hypermetabolism anteriorly and laterally in left abdomen, associated with soft tissue thickening that is difficult to separate from bowel loops. B/l inguinal LNs up to 2.9x3.9cm, SUV 6.6    3/29/22 CT neck w/ and w/o contrast: widespread adenopathy identified in parotic glands bilateral, submandibular space, anterior and posterior cervical chains, superior mediastinum and supraclavicular and infraclavicular spaces of the base of neck/upper chest in appearance strongly suspicious for lymphoma. Multiple subcutaneous masses are also identified in lower occipital scalp and upper neck similar to lymphadenopathy. Biopsy of prominent subcutaneous mass in right upper neck is recommended and should present the easiest location for tissue sampling    Path:  5/23/18 peripheral blood flow cytometry: CD5+ B cell population. No abnormal T cell or myeloid cell populations. CD19+, CD20+, CD5+, CD23+, CD11c+ surface kappa light chain+, CD38-, CD22+ (dim/mod), CD43+, FMC7-. Consistent with CLL    04/26/2022:  Bone marrow biopsy-marrow -trilineage hematopoiesis with 10-20% B-cell infiltrate, C5 positive B-cell population with immunophenotype typical for CLL.  CLL fish within normal limits, heavy chain mutation status cannot be determined.  6q, chromosome 12, chromosome 13, chromosome 11, chromosome 17 and t( 11;14) abnormalities not detected.    05/06/2022 left axillary lymph node core biopsy:  Mantle cell lymphoma with a subset consistent with CLL/SLL., CD5 positive monoclonal B-cell population is identified, approximately 91% of total cells, immuno phenotype is nonspecific and may be seen in CLL, marginal zone lymphoma, or large cell lymphoma.  A 2nd small monoclonal B-cell population approximately 2% of the cells identified with an immunophenotype consistent with CLL.  New genomic consultation lymphoma cells positive for CD 20, CD 5, cyclin D1 and BCL2 with a TI 67 of 10%.  There is no significant staining  for CD10, BCL6.  However workup consistent with diagnosis of mantle cell lymphoma.        ROS  CONSTITUTIONAL: no fevers, no chills,  No weight loss, no  fatigue, no weakness  HEMATOLOGIC: no abnormal bleeding, no abnormal bruising, no drenching night sweats  ONCOLOGIC: no new masses or lumps  HEENT: no vision loss, no tinnitus or hearing loss, no nose bleeding, no dysphagia, no odynophagia  CVS: no chest pain, no palpitations, no dyspnea on exertion  RESP: no shortness of breath, no hemoptysis, no cough  GI: no nausea, no vomiting, no diarrhea, no constipation, no melena, no hematochezia, no hematemesis, no abdominal pain, no increase in abdominal girth  : no dysuria, no hematuria, no hesitancy, no scrotal swelling, no discharge  INTEGUMENT: no rashes, no abnormal bruising, no nail pitting, no hyperpigmentation  NEURO: no falls, no memory loss, no paresthesias or dysesthesias, no urofecal incontinence or retention, no loss of strength on any extremity  MSK: no back pain, no new joint pain, no joint swelling  PSYCH: no suicidal or homicidal ideation, no depression, no insomnia, no anhedonia  ENDOCRINE: no heat or cold intolerance, no polyuria, no polydipsia      Physical Exam     Vitals:    08/17/22 0926   BP: (!) 128/59   Pulse: 67   Resp: 18   Temp: 98.3 °F (36.8 °C)       GEN: AAOx3, NAD  HEENT: PERRLA, EOMI, edentulous, no oral ulcers  NECK:  Supple, full range of motion  LYMPH:  No axillary, supra clavicle or cervical adenopathy appreciated on today's exam.  CVS: s1s2 RRR, no M/R/G, port in place in right chest wall without surrounding edema or erythema.  RESP: CTA b/l, no crackles, no wheezes or rhonchi  ABD: soft, NT, ND, BS+, no hepatomegaly, mild splenomegaly  EXT: no deformities, right lower extremity edema.  SKIN:  No rash,.  No evidence of petechia.  NEURO: normal mentation, strength 5/5 on all 4 extremities, no sensory deficits      ASSESSMENT:    1.)  Mantle cell lymphoma, [place] stage III or IV  based on PET/CT  Ki 67 10%, MIPI 6.7, high risk, SOX11+  Bone marrow biopsy negative for evidence of mantle cell lymphoma.  Positive for CLL.  PET-CT with evidence of bulky lymphadenopathy, largest measuring 14 cm, spleen measuring 15 cm.  Discussed with patient and his family the diagnosis, natural history and treatment options.  Discussed the risk for TLS given bulky lymphadenopathy, perforation given lymphadenopathy around the bowel loops as well as declining ECOG given his advanced age.  Continuet PJP prophylaxis with Bactrim Monday Wednesday Friday and valacyclovir with treatment.  Continue allopurinol 300 mg q.day for TLS prophylaxis.  Patient is status post cycle 1 of bendamustine and rituximab on 05/18/2022.  Patient has bendamustine was reduced by 25% and rituximab was given 1 week apart to reduce the risk of perforation.  S/p cycle 2 and cycle 3 without significant side effects, dosed for bendamustine increased to 100%.  With plan to proceed with cycle 4 with dose reduction by 20% given thrombocytopenia.  Noted platelet count above 75,000. Noted normal ANC.          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     Based on available information, likely SLL/CLL with some constitutional symptoms including weight loss, fatigue and night sweats. Adenopathy easily palpable above and below the diaphragm. Mild thrombocytopenia w/ PLT count ~120k and very mild anemia with Hg ~12 g/dl. No hypercalcemia, no evidence of immune paresis  Biopsies 4/11/22 with Dr Brannon were FNAs and non diagnostic for CLL. I do not know what IHC was obtained or whether pathologist knew what the potential diagnosis was as report only says negative for carcinoma. Will discuss with Dr Lan to add CD5, CD10, CD19, CD20 etc if enough tissue  Regardless, will also refer for axillary LN biopsy, either excisional or at the very least core. Referral sent today  PET CT 4/2022 findings are noted. Given his advanced age he  was not refer for EGD. Masses in abdomen are likely matted gurinder conglomerates and I do not think it would be worth it to attempt a biopsy of those  CLL FISH  Negative , IGHV mutation status  Hep B and C negative  Allopurinol 300mg daily with PO hydration prior to initiation of any therapy  Given concomitant diagnosis of CLL and mantle cell lymphoma will plan to initiate treatment for mantle cell lymphoma and CLL with bendamustine rituximab.  Will consider BTK inhibitors if patient is intolerant to bendamustine/rituximab in the second-line    Plan  Proceed with cycle 4 bendamustine rituximab today, dose reduced by 20%  PET/CT prior to next cycle of treatment  Plan to follow-up in clinic in 4 weeks prior to cycle 5 with repeat labs.  Will consider transitioning to maintenance rituximab if PET-CT with CR given patient's advanced age.  Continue valacyclovir and Bactrim

## 2022-09-14 ENCOUNTER — OFFICE VISIT (OUTPATIENT)
Dept: HEMATOLOGY/ONCOLOGY | Facility: CLINIC | Age: 82
End: 2022-09-14
Payer: MEDICARE

## 2022-09-14 VITALS
DIASTOLIC BLOOD PRESSURE: 63 MMHG | HEART RATE: 71 BPM | TEMPERATURE: 98 F | BODY MASS INDEX: 27.76 KG/M2 | HEIGHT: 65 IN | WEIGHT: 166.63 LBS | RESPIRATION RATE: 18 BRPM | SYSTOLIC BLOOD PRESSURE: 113 MMHG | OXYGEN SATURATION: 99 %

## 2022-09-14 DIAGNOSIS — Z51.11 ENCOUNTER FOR ANTINEOPLASTIC CHEMOTHERAPY: ICD-10-CM

## 2022-09-14 DIAGNOSIS — C83.13 MANTLE CELL LYMPHOMA OF INTRA-ABDOMINAL LYMPH NODES: Primary | ICD-10-CM

## 2022-09-14 PROCEDURE — 99215 OFFICE O/P EST HI 40 MIN: CPT | Mod: ,,, | Performed by: STUDENT IN AN ORGANIZED HEALTH CARE EDUCATION/TRAINING PROGRAM

## 2022-09-14 PROCEDURE — 99215 PR OFFICE/OUTPT VISIT, EST, LEVL V, 40-54 MIN: ICD-10-PCS | Mod: ,,, | Performed by: STUDENT IN AN ORGANIZED HEALTH CARE EDUCATION/TRAINING PROGRAM

## 2022-09-14 RX ORDER — ACETAMINOPHEN 325 MG/1
650 TABLET ORAL
Status: CANCELLED | OUTPATIENT
Start: 2022-09-14

## 2022-09-14 RX ORDER — HEPARIN 100 UNIT/ML
500 SYRINGE INTRAVENOUS
Status: CANCELLED | OUTPATIENT
Start: 2022-09-15

## 2022-09-14 RX ORDER — VALACYCLOVIR HYDROCHLORIDE 500 MG/1
500 TABLET, FILM COATED ORAL 2 TIMES DAILY
Qty: 60 TABLET | Refills: 3 | Status: SHIPPED | OUTPATIENT
Start: 2022-09-14 | End: 2023-04-03 | Stop reason: SDUPTHER

## 2022-09-14 RX ORDER — DIPHENHYDRAMINE HYDROCHLORIDE 50 MG/ML
50 INJECTION INTRAMUSCULAR; INTRAVENOUS ONCE AS NEEDED
Status: CANCELLED | OUTPATIENT
Start: 2022-09-15

## 2022-09-14 RX ORDER — SODIUM CHLORIDE 0.9 % (FLUSH) 0.9 %
10 SYRINGE (ML) INJECTION
Status: CANCELLED | OUTPATIENT
Start: 2022-09-15

## 2022-09-14 RX ORDER — DIPHENHYDRAMINE HYDROCHLORIDE 50 MG/ML
50 INJECTION INTRAMUSCULAR; INTRAVENOUS ONCE AS NEEDED
Status: CANCELLED | OUTPATIENT
Start: 2022-09-14

## 2022-09-14 RX ORDER — EPINEPHRINE 0.3 MG/.3ML
0.3 INJECTION SUBCUTANEOUS ONCE AS NEEDED
Status: CANCELLED | OUTPATIENT
Start: 2022-09-14

## 2022-09-14 RX ORDER — EPINEPHRINE 0.3 MG/.3ML
0.3 INJECTION SUBCUTANEOUS ONCE AS NEEDED
Status: CANCELLED | OUTPATIENT
Start: 2022-09-15

## 2022-09-14 RX ORDER — HEPARIN 100 UNIT/ML
500 SYRINGE INTRAVENOUS
Status: CANCELLED | OUTPATIENT
Start: 2022-09-14

## 2022-09-14 RX ORDER — MEPERIDINE HYDROCHLORIDE 50 MG/ML
25 INJECTION INTRAMUSCULAR; INTRAVENOUS; SUBCUTANEOUS
Status: CANCELLED | OUTPATIENT
Start: 2022-09-14

## 2022-09-14 RX ORDER — SODIUM CHLORIDE 0.9 % (FLUSH) 0.9 %
10 SYRINGE (ML) INJECTION
Status: CANCELLED | OUTPATIENT
Start: 2022-09-14

## 2022-09-14 RX ORDER — FAMOTIDINE 10 MG/ML
20 INJECTION INTRAVENOUS
Status: CANCELLED | OUTPATIENT
Start: 2022-09-14

## 2022-09-14 NOTE — PROGRESS NOTES
DATE:  06/22/2022  PROBLEM:   Mixed hematological/lymph 0 reticular malignancy including;  1. )  CLL/SLL  2. 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 He also had a single visit with Dr Snow in 2018 but never followed back.   Patient had a PET-CT as well as a bone marrow biopsy and repeat peripheral flow cytometry with a diagnosis of CLL/SLL and mantle cell lymphoma.  Patient had symptoms of fatigue and weight loss prior to his workup and diagnosis.    Today, 09/14/2022, patient denies any acute concerns today.  He reports tolerating his last cycle of treatment well without any significant side effects.  He reports a fair appetite denies any nausea, vomiting.  He reports constipation relieved with laxatives.  He denies any new lumps or bumps, fevers, chills, drenching night sweats.  Patient is hoping to get back to work as soon as possible given his excellent performance status.      PMHx: HTN, HLD, GERD, CAD, PORTER  PSHx: PCI x6, lipoma, hernia  Social Hx: never smoker, no ETOH, no drugs. He reports prior exposures to asbestos and pesticides, does not know which ones  Family Hx: father: colon cancer, sister: breast cancer, colon cancer  Meds: reviewed  Allergies: NKDA    Labs:  09/13/2022:  Creatinine 0.80, albumin 3.7, LFTs within normal limits, , uric acid 4.0, WBC count 2.37, hemoglobin 13.2, , platelet count 43380, ANC 1.45.  08/16/2022:  Creatinine 0.81, albumin 3.8, calcium 9.2, total bili 0.5, LFTs within normal limits, uric acid 3.3, , WBC 3.45, hemoglobin 13.7, .8, platelet count 30314, ANC 2.06.  07/19/2022:  WBC 3.75, hemoglobin 13.1, .5, platelet count 96456, ANC 2.32, uric acid 4.1, , creatinine 1.09, albumin 3.5, calcium 9.6, LFTs within normal limits.  06/21/2022  WBC at 2.58 bed neutrophil adequate at 55%.  Hemoglobin 12.7, hematocrit 39.4.  2022:  Creatinine 0.99, calcium 9.1, , uric acid 3.7, phosphorus 3.3, potassium 4.5, WBC count 2.58, hemoglobin 12.7, platelet count 32208, ANC 1.44.  2022:  Potassium 4.9, sodium 140, creatinine 0.87, , uric acid 6.6.  2022:  Creatinine 1.03, albumin 3.3, ,  WBC count 10.32, hemoglobin 12.4, platelet count 135 1000.   2022:  Creatinine 0.93, albumin 3.6, total protein 6.4, , potassium 5.2, WBC count 9.6, hemoglobin 11.4, .4, platelet count 92.   4/ Cr 0.89 Alb 3.4 TP 6.4 Ca 9.4  TSH 2.0 Iron 58n TIBC 285 Ferritin 48 Folate 9.9 B12 426 Hep B s ag neg Uric acid 7.8 Hep C ab neg WBC 9.79 RBC 3.77 Hg 11.8 .5 RDW 13.4 PLT 91 ANC 2.79 ALC 6.36 Direct Ramos neg b2 micro 7.5 Copper 135 Hapto 225 Hep B core ab neg SPEP w/ ARABELLA normal Abs kappa 32 Abs lambda 124 SFLC ratio 0.26  3/18/22 Cr 1 AST 27 ALT 12 AlkPhos 87 Alb 4.1 TP 6.5 Ca 9.8 WBC 9.4 RBC 3.92 Hg 12.2 MCV 96 RDW 15.1  ALC 5.8 ANC 3  5/15/18 WBC 14.71 RBC 4.42 Hg 14.3 MCV 98.6 RDW 13  ANC 2.91 ALC 10.13 AMC 1.29    Imagin2022 PET-CT:  Excellent response to therapy since her last exam with interval resolution or near complete resolution of multiple hypermetabolic lymph nodes above and below the diaphragm.  There is residual mild-to-moderate uptake in the right axillary lymph node, Deauville score 4  Interval resolution of diffuse uptake in spleen which is also decreased in size.  No new suspicious uptake.    05/10/22:-  right lower extremity Doppler negative for DVT      22 PET CT: multiple hypermetabolic LNs b/l neck. Right submandibular LN .7x1.7cm, SUV 7.8. Left submandibular LN 2.3x1.7cm SUV 7.2. Multiple hypermetabolic LNs in chest. Right axillary node 3.3x2cm SUV 8.1. Hypermetabolic left axillary LN SUV 6.4, 1.9x3.2cm. Hypermetabolic right paratracheal LN 2.4x1.6cm SUV 4.9.  Right pulmonary hilum SUV 2.7. Subcarinal LN SUV 3.3, 1.2x3.3cm. Spleen enlarged at 15cm and demonstrates heterogeneous mild to moderate uptake SUV 5.2. Moderately intense activity in stomach and proximal duodenum with no degree of focality or discrete mass on CT. Multiple hypermetabolic LNs in abd and pelvis. Left periaortic LN SUV 6.8, 4x3.5cm. Large hypermetabolic mesenteric mass [id number] SUV 8.5. Aortocaval LN 3.5x3.8cm SUV 5.1. Hypermetabolic mass in left pelvis 5.5x7cm SUV 7. Left external iliac LN SUV 6 2.2x2.1cm. Extensive hypermetabolism anteriorly and laterally in left abdomen, associated with soft tissue thickening that is difficult to separate from bowel loops. B/l inguinal LNs up to 2.9x3.9cm, SUV 6.6    3/29/22 CT neck w/ and w/o contrast: widespread adenopathy identified in parotic glands bilateral, submandibular space, anterior and posterior cervical chains, superior mediastinum and supraclavicular and infraclavicular spaces of the base of neck/upper chest in appearance strongly suspicious for lymphoma. Multiple subcutaneous masses are also identified in lower occipital scalp and upper neck similar to lymphadenopathy. Biopsy of prominent subcutaneous mass in right upper neck is recommended and should present the easiest location for tissue sampling    Path:  5/23/18 peripheral blood flow cytometry: CD5+ B cell population. No abnormal T cell or myeloid cell populations. CD19+, CD20+, CD5+, CD23+, CD11c+ surface kappa light chain+, CD38-, CD22+ (dim/mod), CD43+, FMC7-. Consistent with CLL    04/26/2022:  Bone marrow biopsy-marrow -trilineage hematopoiesis with 10-20% B-cell infiltrate, C5 positive B-cell population with immunophenotype typical for CLL.  CLL fish within normal limits, heavy chain mutation status cannot be determined.  6q, chromosome 12, chromosome 13, chromosome 11, chromosome 17 and t( 11;14) abnormalities not detected.    05/06/2022 left axillary lymph node core biopsy:  Mantle  cell lymphoma with a subset consistent with CLL/SLL., CD5 positive monoclonal B-cell population is identified, approximately 91% of total cells, immuno phenotype is nonspecific and may be seen in CLL, marginal zone lymphoma, or large cell lymphoma.  A 2nd small monoclonal B-cell population approximately 2% of the cells identified with an immunophenotype consistent with CLL.  New genomic consultation lymphoma cells positive for CD 20, CD 5, cyclin D1 and BCL2 with a TI 67 of 10%.  There is no significant staining for CD10, BCL6.  However workup consistent with diagnosis of mantle cell lymphoma.        ROS  CONSTITUTIONAL: no fevers, no chills,  No weight loss, no  fatigue, no weakness  HEMATOLOGIC: no abnormal bleeding, no abnormal bruising, no drenching night sweats  ONCOLOGIC: no new masses or lumps  HEENT: no vision loss, no tinnitus or hearing loss, no nose bleeding, no dysphagia, no odynophagia  CVS: no chest pain, no palpitations, no dyspnea on exertion  RESP: no shortness of breath, no hemoptysis, no cough  GI: no nausea, no vomiting, no diarrhea, no constipation, no melena, no hematochezia, no hematemesis, no abdominal pain, no increase in abdominal girth  : no dysuria, no hematuria, no hesitancy, no scrotal swelling, no discharge  INTEGUMENT: no rashes, no abnormal bruising, no nail pitting, no hyperpigmentation  NEURO: no falls, no memory loss, no paresthesias or dysesthesias, no urofecal incontinence or retention, no loss of strength on any extremity  MSK: no back pain, no new joint pain, no joint swelling  PSYCH: no suicidal or homicidal ideation, no depression, no insomnia, no anhedonia  ENDOCRINE: no heat or cold intolerance, no polyuria, no polydipsia      Physical Exam     Vitals:    09/14/22 0824   BP: 113/63   Pulse: 71   Resp: 18   Temp: 97.8 °F (36.6 °C)         GEN: AAOx3, NAD  HEENT: PERRLA, EOMI, edentulous, no oral ulcers  NECK:  Supple, full range of motion  LYMPH:  No axillary, supra  clavicle or cervical adenopathy appreciated on today's exam.  CVS: s1s2 RRR, no M/R/G, port in place in right chest wall without surrounding edema or erythema.  RESP: CTA b/l, no crackles, no wheezes or rhonchi  ABD: soft, NT, ND, BS+, no hepatomegaly, mild splenomegaly  EXT: no deformities, right lower extremity edema.  SKIN:  No rash,.  No evidence of petechia.  NEURO: normal mentation, strength 5/5 on all 4 extremities, no sensory deficits      ASSESSMENT:    1.)  Mantle cell lymphoma, [employer]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  Positive for CLL.  PET-CT with evidence of bulky lymphadenopathy, largest measuring 14 cm, spleen measuring 15 cm.  Discussed with patient and his family the diagnosis, natural history and treatment options.  Discussed the risk for TLS given bulky lymphadenopathy, perforation given lymphadenopathy around the bowel loops as well as declining ECOG given his advanced age.  Continuet PJP prophylaxis with Bactrim Monday Wednesday Friday and valacyclovir with treatment.  Continue allopurinol 300 mg q.day for TLS prophylaxis.  Patient is status post cycle 1 of bendamustine and rituximab on 05/18/2022.  Patient has bendamustine was reduced by 25% and rituximab was given 1 week apart to reduce the risk of perforation.  S/p cycle 2 and cycle 3 without significant side effects, dosed for bendamustine increased to 100%.  Discussed with patient interim PET-CT with excellent response to treatment however with residual hypermetabolism in right axillary lymph node with a Deauville score of 4.  Given his excellent tolerance of treatment, will proceed to complete 6 cycles of BR in the setting of above PET-CT report.  Discussed alternate treatment plan to proceed with ibrutinib given partial response.  Patient elected to proceed with another 2 cycles of BR with plans to repeat PET-CT after completion of  treatment to assess treatment response.  With plan to proceed with cycle 5 with dose reduction by 20% given thrombocytopenia.  Noted platelet count above 75,000. Noted  ANC>1000.          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     Based on available information, likely SLL/CLL with some constitutional symptoms including weight loss, fatigue and night sweats. Adenopathy easily palpable above and below the diaphragm. Mild thrombocytopenia w/ PLT count ~120k and very mild anemia with Hg ~12 g/dl. No hypercalcemia, no evidence of immune paresis  Biopsies 4/11/22 with Dr Brannon were FNAs and non diagnostic for CLL. I do not know what IHC was obtained or whether pathologist knew what the potential diagnosis was as report only says negative for carcinoma. Will discuss with Dr Lan to add CD5, CD10, CD19, CD20 etc if enough tissue  Regardless, will also refer for axillary LN biopsy, either excisional or at the very least core. Referral sent today  PET CT 4/2022 findings are noted. Given his advanced age he was not refer for EGD. Masses in abdomen are likely matted gurinder conglomerates and I do not think it would be worth it to attempt a biopsy of those  CLL FISH  Negative , IGHV mutation status  Hep B and C negative  Allopurinol 300mg daily with PO hydration prior to initiation of any therapy  Given concomitant diagnosis of CLL and mantle cell lymphoma will plan to initiate treatment for mantle cell lymphoma and CLL with bendamustine rituximab.  Will consider BTK inhibitors if patient is intolerant to bendamustine/rituximab in the second-line    Plan  Proceed with cycle 5 bendamustine rituximab today, dose reduced by 20%  Plan to follow-up in clinic in 4 weeks prior to cycle 6 with repeat labs.    Continue valacyclovir and Bactrim as well as allopurinol.  Will plan to repeat PET-CT after cycle 6 to assess treatment response prior to transitioning to rituximab.

## 2022-09-29 ENCOUNTER — TELEPHONE (OUTPATIENT)
Dept: HEMATOLOGY/ONCOLOGY | Facility: CLINIC | Age: 82
End: 2022-09-29

## 2022-09-29 NOTE — TELEPHONE ENCOUNTER
Pt's daughter Angelica reports Pt c/o fever with highest reading 102, SOB, chest pain, and weakness. States at home COVID test performed with positive results. Instructed for Pt to go to ED and if unable to transport in personal vehicle to contact 911 for transport. Also instructed if transport per personal vehicle to contact [facility] ED on the way to notify of COVID positive results.--SC, LPN

## 2022-10-12 ENCOUNTER — OFFICE VISIT (OUTPATIENT)
Dept: HEMATOLOGY/ONCOLOGY | Facility: CLINIC | Age: 82
End: 2022-10-12
Payer: MEDICARE

## 2022-10-12 VITALS
WEIGHT: 161.38 LBS | BODY MASS INDEX: 26.89 KG/M2 | TEMPERATURE: 99 F | SYSTOLIC BLOOD PRESSURE: 114 MMHG | OXYGEN SATURATION: 98 % | DIASTOLIC BLOOD PRESSURE: 72 MMHG | HEIGHT: 65 IN | HEART RATE: 82 BPM | RESPIRATION RATE: 18 BRPM

## 2022-10-12 DIAGNOSIS — C83.13 MANTLE CELL LYMPHOMA OF INTRA-ABDOMINAL LYMPH NODES: Primary | ICD-10-CM

## 2022-10-12 DIAGNOSIS — Z51.11 ENCOUNTER FOR ANTINEOPLASTIC CHEMOTHERAPY: ICD-10-CM

## 2022-10-12 DIAGNOSIS — L50.9 URTICARIA: ICD-10-CM

## 2022-10-12 PROCEDURE — 99215 OFFICE O/P EST HI 40 MIN: CPT | Mod: ,,, | Performed by: STUDENT IN AN ORGANIZED HEALTH CARE EDUCATION/TRAINING PROGRAM

## 2022-10-12 PROCEDURE — 99215 PR OFFICE/OUTPT VISIT, EST, LEVL V, 40-54 MIN: ICD-10-PCS | Mod: ,,, | Performed by: STUDENT IN AN ORGANIZED HEALTH CARE EDUCATION/TRAINING PROGRAM

## 2022-10-12 RX ORDER — MINERAL OIL
180 ENEMA (ML) RECTAL DAILY PRN
Qty: 20 TABLET | Refills: 0 | Status: SHIPPED | OUTPATIENT
Start: 2022-10-12 | End: 2022-12-05

## 2022-10-12 RX ORDER — CHLOPHEDIANOL HCL AND PYRILAMINE MALEATE 12.5; 12.5 MG/5ML; MG/5ML
10 SOLUTION ORAL
COMMUNITY
End: 2022-12-05

## 2022-10-12 NOTE — PROGRESS NOTES
DATE:  06/22/2022  PROBLEM:   Mixed hematological/lymph 0 reticular malignancy including;  1. )  CLL/SLL  2. )  Mantle cell lymphoma     HPI: 82 y/o M w/ PMHx of HTN, HLD, GERD, CAD, PORTER referred to [facility] for lymphocytosis and lymphadenopathy    Of note, review of his records reveals a flow cytometry sent in 2018 that was consistent with CLL. He also had a single visit with Dr Snow in 2018 but never followed back.   Patient had a PET-CT as well as a bone marrow biopsy and repeat peripheral flow cytometry with a diagnosis of CLL/SLL and mantle cell lymphoma.  Patient had symptoms of fatigue and weight loss prior to his workup and diagnosis.    Today, 10/12/2022, patient reports being diagnosed with COVID since his last follow-up visit with us, on the 20th of September and was treated symptomatically without hospitalization or treatment with PACs COVID.  He denies any symptoms of fevers, chills, shortness of breath currently.  He does report productive cough which is improved since prior.  Patient reports decreased taste and sense of smell after diagnosis of COVID leading to decreased appetite and weight loss.  He denies any fevers, chills, drenching night sweats, new lumps or bumps currently.  He does report mild fatigue which is improving from prior.      PMHx: HTN, HLD, GERD, CAD, PORTER  PSHx: PCI x6, lipoma, hernia  Social Hx: never smoker, no ETOH, no drugs. He reports prior exposures to asbestos and pesticides, does not know which ones  Family Hx: father: colon cancer, sister: breast cancer, colon cancer  Meds: reviewed  Allergies: NKDA    Labs:    10/11/2022:  Creatinine 1.04, albumin 3.6, LFTs within normal limits, magnesium 2.0, phosphorus 2.7, , uric acid 3.2, WBC 2.4, hemoglobin 13.4, .5, platelet count 36311.  ANC 1.77.  09/13/2022:  Creatinine 0.80, albumin 3.7, LFTs within normal limits, , uric acid 4.0, WBC count 2.37, hemoglobin 13.2, , platelet count 13261, ANC  1.45.  2022:  Creatinine 0.81, albumin 3.8, calcium 9.2, total bili 0.5, LFTs within normal limits, uric acid 3.3, , WBC 3.45, hemoglobin 13.7, .8, platelet count 79143, ANC 2.06.  2022:  WBC 3.75, hemoglobin 13.1, .5, platelet count 19539, ANC 2.32, uric acid 4.1, , creatinine 1.09, albumin 3.5, calcium 9.6, LFTs within normal limits.  2022 WBC at 2.58 bed neutrophil adequate at 55%.  Hemoglobin 12.7, hematocrit 39.4.  2022:  Creatinine 0.99, calcium 9.1, , uric acid 3.7, phosphorus 3.3, potassium 4.5, WBC count 2.58, hemoglobin 12.7, platelet count 94007, ANC 1.44.  2022:  Potassium 4.9, sodium 140, creatinine 0.87, , uric acid 6.6.  2022:  Creatinine 1.03, albumin 3.3, ,  WBC count 10.32, hemoglobin 12.4, platelet count 135 1000.   2022:  Creatinine 0.93, albumin 3.6, total protein 6.4, , potassium 5.2, WBC count 9.6, hemoglobin 11.4, .4, platelet count 92.   4 Cr 0.89 Alb 3.4 TP 6.4 Ca 9.4  TSH 2.0 Iron 58n TIBC 285 Ferritin 48 Folate 9.9 B12 426 Hep B s ag neg Uric acid 7.8 Hep C ab neg WBC 9.79 RBC 3.77 Hg 11.8 .5 RDW 13.4 PLT 91 ANC 2.79 ALC 6.36 Direct Ramos neg b2 micro 7.5 Copper 135 Hapto 225 Hep B core ab neg SPEP w/ ARABELLA normal Abs kappa 32 Abs lambda 124 SFLC ratio 0.26  3/18/22 Cr 1 AST 27 ALT 12 AlkPhos 87 Alb 4.1 TP 6.5 Ca 9.8 WBC 9.4 RBC 3.92 Hg 12.2 MCV 96 RDW 15.1  ALC 5.8 ANC 3  5/15/18 WBC 14.71 RBC 4.42 Hg 14.3 MCV 98.6 RDW 13  ANC 2.91 ALC 10.13 AMC 1.29    Imagin2022 PET-CT:  Excellent response to therapy since her last exam with interval resolution or near complete resolution of multiple hypermetabolic lymph nodes above and below the diaphragm.  There is residual mild-to-moderate uptake in the right axillary lymph node, Deauville score 4  Interval resolution of diffuse uptake in spleen which is also decreased in size.  No new suspicious  uptake.    05/10/22:-  right lower extremity Doppler negative for DVT      4/13/22 PET CT: multiple hypermetabolic LNs b/l neck. Right submandibular LN .7x1.7cm, SUV 7.8. Left submandibular LN 2.3x1.7cm SUV 7.2. Multiple hypermetabolic LNs in chest. Right axillary node 3.3x2cm SUV 8.1. Hypermetabolic left axillary LN SUV 6.4, 1.9x3.2cm. Hypermetabolic right paratracheal LN 2.4x1.6cm SUV 4.9. Right pulmonary hilum SUV 2.7. Subcarinal LN SUV 3.3, 1.2x3.3cm. Spleen enlarged at 15cm and demonstrates heterogeneous mild to moderate uptake SUV 5.2. Moderately intense activity in stomach and proximal duodenum with no degree of focality or discrete mass on CT. Multiple hypermetabolic LNs in abd and pelvis. Left periaortic LN SUV 6.8, 4x3.5cm. Large hypermetabolic mesenteric mass 2u3s80ys SUV 8.5. Aortocaval LN 3.5x3.8cm SUV 5.1. Hypermetabolic mass in left pelvis 5.5x7cm SUV 7. Left external iliac LN SUV 6 2.2x2.1cm. Extensive hypermetabolism anteriorly and laterally in left abdomen, associated with soft tissue thickening that is difficult to separate from bowel loops. B/l inguinal LNs up to 2.9x3.9cm, SUV 6.6    3/29/22 CT neck w/ and w/o contrast: widespread adenopathy identified in parotic glands bilateral, submandibular space, anterior and posterior cervical chains, superior mediastinum and supraclavicular and infraclavicular spaces of the base of neck/upper chest in appearance strongly suspicious for lymphoma. Multiple subcutaneous masses are also identified in lower occipital scalp and upper neck similar to lymphadenopathy. Biopsy of prominent subcutaneous mass in right upper neck is recommended and should present the easiest location for tissue sampling    Path:  5/23/18 peripheral blood flow cytometry: CD5+ B cell population. No abnormal T cell or myeloid cell populations. CD19+, CD20+, CD5+, CD23+, CD11c+ surface kappa light chain+, CD38-, CD22+ (dim/mod), CD43+, FMC7-. Consistent with CLL    04/26/2022:  Bone  marrow biopsy-marrow -trilineage hematopoiesis with 10-20% B-cell infiltrate, C5 positive B-cell population with immunophenotype typical for CLL.  CLL fish within normal limits, heavy chain mutation status cannot be determined.  6q, chromosome 12, chromosome 13, chromosome 11, chromosome 17 and t( 11;14) abnormalities not detected.    05/06/2022 left axillary lymph node core biopsy:  Mantle cell lymphoma with a subset consistent with CLL/SLL., CD5 positive monoclonal B-cell population is identified, approximately 91% of total cells, immuno phenotype is nonspecific and may be seen in CLL, marginal zone lymphoma, or large cell lymphoma.  A 2nd small monoclonal B-cell population approximately 2% of the cells identified with an immunophenotype consistent with CLL.  New genomic consultation lymphoma cells positive for CD 20, CD 5, cyclin D1 and BCL2 with a TI 67 of 10%.  There is no significant staining for CD10, BCL6.  However workup consistent with diagnosis of mantle cell lymphoma.        ROS  CONSTITUTIONAL: no fevers, no chills,  No weight loss, no  fatigue, no weakness  HEMATOLOGIC: no abnormal bleeding, no abnormal bruising, no drenching night sweats  ONCOLOGIC: no new masses or lumps  HEENT: no vision loss, no tinnitus or hearing loss, no nose bleeding, no dysphagia, no odynophagia  CVS: no chest pain, no palpitations, no dyspnea on exertion  RESP: no shortness of breath, no hemoptysis, no cough  GI: no nausea, no vomiting, no diarrhea, no constipation, no melena, no hematochezia, no hematemesis, no abdominal pain, no increase in abdominal girth  : no dysuria, no hematuria, no hesitancy, no scrotal swelling, no discharge  INTEGUMENT: no rashes, no abnormal bruising, no nail pitting, no hyperpigmentation  NEURO: no falls, no memory loss, no paresthesias or dysesthesias, no urofecal incontinence or retention, no loss of strength on any extremity  MSK: no back pain, no new joint pain, no joint swelling  PSYCH: no  suicidal or homicidal ideation, no depression, no insomnia, no anhedonia  ENDOCRINE: no heat or cold intolerance, no polyuria, no polydipsia      Physical Exam     Vitals:    10/12/22 0755   BP: 114/72   Pulse: 82   Resp: 18   Temp: 99 °F (37.2 °C)         GEN: AAOx3, NAD  HEENT: PERRLA, EOMI, edentulous, no oral ulcers  NECK:  Supple, full range of motion  LYMPH:  No axillary, supra clavicle or cervical adenopathy appreciated on today's exam.  CVS: s1s2 RRR, no M/R/G, port in place in right chest wall without surrounding edema or erythema.  RESP: CTA b/l, no crackles, no wheezes or rhonchi  ABD: soft, NT, ND, BS+, no hepatomegaly, mild splenomegaly  EXT: no deformities, right lower extremity edema.  SKIN:  No rash,.  No evidence of petechia.  NEURO: normal mentation, strength 5/5 on all 4 extremities, no sensory deficits      ASSESSMENT:    # Mantle cell lymphoma, Medina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  Positive for CLL.  PET-CT with evidence of bulky lymphadenopathy, largest measuring 14 cm, spleen measuring 15 cm.  Discussed with patient and his family the diagnosis, natural history and treatment options.  Discussed the risk for TLS given bulky lymphadenopathy, perforation given lymphadenopathy around the bowel loops as well as declining ECOG given his advanced age.  Continuet PJP prophylaxis with Bactrim Monday Wednesday Friday and valacyclovir with treatment.  Continue allopurinol 300 mg q.day for TLS prophylaxis.  Patient is status post cycle 1 of bendamustine and rituximab on 05/18/2022.  Patient has bendamustine was reduced by 25% and rituximab was given 1 week apart to reduce the risk of perforation.  S/p cycle 2 and cycle 3 without significant side effects, dosed for bendamustine increased to 100%.  Discussed with patient interim PET-CT with excellent response to treatment however with residual  hypermetabolism in right axillary lymph node with a Deauville score of 4.  Given his excellent tolerance of treatment, will proceed to complete 6 cycles of BR in the setting of above PET-CT report.  Discussed alternate treatment plan to proceed with ibrutinib given partial response.  Patient elected to proceed with another 2 cycles of BR with plans to repeat PET-CT after completion of treatment to assess treatment response.  Cycle 5 with dose reduction by 20% given thrombocytopenia.        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     Based on available information, likely SLL/CLL with some constitutional symptoms including weight loss, fatigue and night sweats. Adenopathy easily palpable above and below the diaphragm. Mild thrombocytopenia w/ PLT count ~120k and very mild anemia with Hg ~12 g/dl. No hypercalcemia, no evidence of immune paresis  Biopsies 4/11/22 with Dr Brannon were FNAs and non diagnostic for CLL. I do not know what IHC was obtained or whether pathologist knew what the potential diagnosis was as report only says negative for carcinoma. Will discuss with Dr Lan to add CD5, CD10, CD19, CD20 etc if enough tissue  Regardless, will also refer for axillary LN biopsy, either excisional or at the very least core. Referral sent today  PET CT 4/2022 findings are noted. Given his advanced age he was not refer for EGD. Masses in abdomen are likely matted gurinder conglomerates and I do not think it would be worth it to attempt a biopsy of those  CLL FISH  Negative , IGHV mutation status  Hep B and C negative  Allopurinol 300mg daily with PO hydration prior to initiation of any therapy  Given concomitant diagnosis of CLL and mantle cell lymphoma will plan to initiate treatment for mantle cell lymphoma and CLL with bendamustine rituximab.  Will consider BTK inhibitors if patient is intolerant to bendamustine/rituximab in the second-line    Plan  Will postpone patient's treatment for today by  2 weeks given his recent diagnosis of COVID and his symptoms of fatigue/weight loss.    Plan to complete 6 cycles of chemotherapy with repeat PET-CT to assess treatment response.  Follow-up in clinic in 2 weeks with repeat labs prior to cycle 6.      A total of  40 minutes were spent in review of records, interpretation of test, coordination of care, discussion and counseling with the patient.      Portions of the record may have been created with voice recognition software. Occasional wrong-word or sound-a-like substitutions may have occurred due to the inherent limitations of voice recognition software. Read the chart carefully and recognize, using context, where substitutions have occurred.

## 2022-10-26 ENCOUNTER — OFFICE VISIT (OUTPATIENT)
Dept: HEMATOLOGY/ONCOLOGY | Facility: CLINIC | Age: 82
End: 2022-10-26
Payer: MEDICARE

## 2022-10-26 VITALS
DIASTOLIC BLOOD PRESSURE: 83 MMHG | OXYGEN SATURATION: 98 % | TEMPERATURE: 99 F | BODY MASS INDEX: 27.82 KG/M2 | SYSTOLIC BLOOD PRESSURE: 128 MMHG | HEART RATE: 73 BPM | RESPIRATION RATE: 18 BRPM | WEIGHT: 167.19 LBS

## 2022-10-26 DIAGNOSIS — C83.13 MANTLE CELL LYMPHOMA OF INTRA-ABDOMINAL LYMPH NODES: ICD-10-CM

## 2022-10-26 DIAGNOSIS — Z51.11 ENCOUNTER FOR ANTINEOPLASTIC CHEMOTHERAPY: Primary | ICD-10-CM

## 2022-10-26 PROCEDURE — 99215 PR OFFICE/OUTPT VISIT, EST, LEVL V, 40-54 MIN: ICD-10-PCS | Mod: ,,, | Performed by: STUDENT IN AN ORGANIZED HEALTH CARE EDUCATION/TRAINING PROGRAM

## 2022-10-26 PROCEDURE — 99215 OFFICE O/P EST HI 40 MIN: CPT | Mod: ,,, | Performed by: STUDENT IN AN ORGANIZED HEALTH CARE EDUCATION/TRAINING PROGRAM

## 2022-10-26 RX ORDER — ACETAMINOPHEN 325 MG/1
650 TABLET ORAL
Status: CANCELLED | OUTPATIENT
Start: 2022-10-26

## 2022-10-26 RX ORDER — MEPERIDINE HYDROCHLORIDE 50 MG/ML
25 INJECTION INTRAMUSCULAR; INTRAVENOUS; SUBCUTANEOUS
Status: CANCELLED | OUTPATIENT
Start: 2022-10-26

## 2022-10-26 RX ORDER — FAMOTIDINE 10 MG/ML
20 INJECTION INTRAVENOUS
Status: CANCELLED | OUTPATIENT
Start: 2022-10-26

## 2022-10-26 RX ORDER — DIPHENHYDRAMINE HYDROCHLORIDE 50 MG/ML
50 INJECTION INTRAMUSCULAR; INTRAVENOUS ONCE AS NEEDED
Status: CANCELLED | OUTPATIENT
Start: 2022-10-27

## 2022-10-26 RX ORDER — SODIUM CHLORIDE 0.9 % (FLUSH) 0.9 %
10 SYRINGE (ML) INJECTION
Status: CANCELLED | OUTPATIENT
Start: 2022-10-27

## 2022-10-26 RX ORDER — SODIUM CHLORIDE 0.9 % (FLUSH) 0.9 %
10 SYRINGE (ML) INJECTION
Status: CANCELLED | OUTPATIENT
Start: 2022-10-26

## 2022-10-26 RX ORDER — EPINEPHRINE 0.3 MG/.3ML
0.3 INJECTION SUBCUTANEOUS ONCE AS NEEDED
Status: CANCELLED | OUTPATIENT
Start: 2022-10-26

## 2022-10-26 RX ORDER — DIPHENHYDRAMINE HYDROCHLORIDE 50 MG/ML
50 INJECTION INTRAMUSCULAR; INTRAVENOUS ONCE AS NEEDED
Status: CANCELLED | OUTPATIENT
Start: 2022-10-26

## 2022-10-26 RX ORDER — ALLOPURINOL 300 MG/1
300 TABLET ORAL DAILY
Qty: 30 TABLET | Refills: 0 | Status: CANCELLED | OUTPATIENT
Start: 2022-10-26 | End: 2022-11-25

## 2022-10-26 RX ORDER — HEPARIN 100 UNIT/ML
500 SYRINGE INTRAVENOUS
Status: CANCELLED | OUTPATIENT
Start: 2022-10-27

## 2022-10-26 RX ORDER — EPINEPHRINE 0.3 MG/.3ML
0.3 INJECTION SUBCUTANEOUS ONCE AS NEEDED
Status: CANCELLED | OUTPATIENT
Start: 2022-10-27

## 2022-10-26 RX ORDER — HEPARIN 100 UNIT/ML
500 SYRINGE INTRAVENOUS
Status: CANCELLED | OUTPATIENT
Start: 2022-10-26

## 2022-10-26 NOTE — PROGRESS NOTES
DATE:  06/22/2022  PROBLEM:   Mixed hematological/lymph 0 reticular malignancy including;  1. )  CLL/SLL  2. )  Mantle cell lymphoma     HPI: 82 y/o M w/ PMHx of HTN, HLD, GERD, CAD, PORTER referred to [facility] for lymphocytosis and lymphadenopathy    Of note, review of his records reveals a flow cytometry sent in 2018 that was consistent with CLL. He also had a single visit with Dr Snow in 2018 but never followed back.   Patient had a PET-CT as well as a bone marrow biopsy and repeat peripheral flow cytometry with a diagnosis of CLL/SLL and mantle cell lymphoma.  Patient had symptoms of fatigue and weight loss prior to his workup and diagnosis.    Today, 10/26/2022, patient denies any acute concerns today.  He reports recovering back to his baseline after his prior episode of COVID.  He denies any fevers, chills, drenching night sweats, new lumps or bumps, decreased appetite or weight loss.  He does report a skin tag/lesion over his right temporal region which has been growing since the last few months.  He denies any other skin rashes/lesions.    PMHx: HTN, HLD, GERD, CAD, PORTER  PSHx: PCI x6, lipoma, hernia  Social Hx: never smoker, no ETOH, no drugs. He reports prior exposures to asbestos and pesticides, does not know which ones  Family Hx: father: colon cancer, sister: breast cancer, colon cancer  Meds: reviewed  Allergies: NKDA    Labs:  10/25/2022:  Creatinine 0.89, albumin 3.5, LFTs within normal limits, , uric acid 3.6.  WBC count 3.15, hemoglobin 12.7, .3, platelet count 125 1000, ANC 2.28.  10/11/2022:  Creatinine 1.04, albumin 3.6, LFTs within normal limits, magnesium 2.0, phosphorus 2.7, , uric acid 3.2, WBC 2.4, hemoglobin 13.4, .5, platelet count 47691.  ANC 1.77.  09/13/2022:  Creatinine 0.80, albumin 3.7, LFTs within normal limits, , uric acid 4.0, WBC count 2.37, hemoglobin 13.2, , platelet count 06408, ANC 1.45.  08/16/2022:  Creatinine 0.81, albumin 3.8,  calcium 9.2, total bili 0.5, LFTs within normal limits, uric acid 3.3, , WBC 3.45, hemoglobin 13.7, .8, platelet count 20812, ANC 2.06.  2022:  WBC 3.75, hemoglobin 13.1, .5, platelet count 43531, ANC 2.32, uric acid 4.1, , creatinine 1.09, albumin 3.5, calcium 9.6, LFTs within normal limits.  2022 WBC at 2.58 bed neutrophil adequate at 55%.  Hemoglobin 12.7, hematocrit 39.4.  2022:  Creatinine 0.99, calcium 9.1, , uric acid 3.7, phosphorus 3.3, potassium 4.5, WBC count 2.58, hemoglobin 12.7, platelet count 83115, ANC 1.44.  2022:  Potassium 4.9, sodium 140, creatinine 0.87, , uric acid 6.6.  2022:  Creatinine 1.03, albumin 3.3, ,  WBC count 10.32, hemoglobin 12.4, platelet count 135 1000.   2022:  Creatinine 0.93, albumin 3.6, total protein 6.4, , potassium 5.2, WBC count 9.6, hemoglobin 11.4, .4, platelet count 92.   4/ Cr 0.89 Alb 3.4 TP 6.4 Ca 9.4  TSH 2.0 Iron 58n TIBC 285 Ferritin 48 Folate 9.9 B12 426 Hep B s ag neg Uric acid 7.8 Hep C ab neg WBC 9.79 RBC 3.77 Hg 11.8 .5 RDW 13.4 PLT 91 ANC 2.79 ALC 6.36 Direct Ramos neg b2 micro 7.5 Copper 135 Hapto 225 Hep B core ab neg SPEP w/ ARABELLA normal Abs kappa 32 Abs lambda 124 SFLC ratio 0.26  3/18/22 Cr 1 AST 27 ALT 12 AlkPhos 87 Alb 4.1 TP 6.5 Ca 9.8 WBC 9.4 RBC 3.92 Hg 12.2 MCV 96 RDW 15.1  ALC 5.8 ANC 3  5/15/18 WBC 14.71 RBC 4.42 Hg 14.3 MCV 98.6 RDW 13  ANC 2.91 ALC 10.13 AMC 1.29    Imagin2022 PET-CT:  Excellent response to therapy since her last exam with interval resolution or near complete resolution of multiple hypermetabolic lymph nodes above and below the diaphragm.  There is residual mild-to-moderate uptake in the right axillary lymph node, Deauville score 4  Interval resolution of diffuse uptake in spleen which is also decreased in size.  No new suspicious uptake.    05/10/22:-  right lower extremity Doppler  negative for DVT      4/13/22 PET CT: multiple hypermetabolic LNs b/l neck. Right submandibular LN .7x1.7cm, SUV 7.8. Left submandibular LN 2.3x1.7cm SUV 7.2. Multiple hypermetabolic LNs in chest. Right axillary node 3.3x2cm SUV 8.1. Hypermetabolic left axillary LN SUV 6.4, 1.9x3.2cm. Hypermetabolic right paratracheal LN 2.4x1.6cm SUV 4.9. Right pulmonary hilum SUV 2.7. Subcarinal LN SUV 3.3, 1.2x3.3cm. Spleen enlarged at 15cm and demonstrates heterogeneous mild to moderate uptake SUV 5.2. Moderately intense activity in stomach and proximal duodenum with no degree of focality or discrete mass on CT. Multiple hypermetabolic LNs in abd and pelvis. Left periaortic LN SUV 6.8, 4x3.5cm. Large hypermetabolic mesenteric mass [id number] SUV 8.5. Aortocaval LN 3.5x3.8cm SUV 5.1. Hypermetabolic mass in left pelvis 5.5x7cm SUV 7. Left external iliac LN SUV 6 2.2x2.1cm. Extensive hypermetabolism anteriorly and laterally in left abdomen, associated with soft tissue thickening that is difficult to separate from bowel loops. B/l inguinal LNs up to 2.9x3.9cm, SUV 6.6    3/29/22 CT neck w/ and w/o contrast: widespread adenopathy identified in parotic glands bilateral, submandibular space, anterior and posterior cervical chains, superior mediastinum and supraclavicular and infraclavicular spaces of the base of neck/upper chest in appearance strongly suspicious for lymphoma. Multiple subcutaneous masses are also identified in lower occipital scalp and upper neck similar to lymphadenopathy. Biopsy of prominent subcutaneous mass in right upper neck is recommended and should present the easiest location for tissue sampling    Path:  5/23/18 peripheral blood flow cytometry: CD5+ B cell population. No abnormal T cell or myeloid cell populations. CD19+, CD20+, CD5+, CD23+, CD11c+ surface kappa light chain+, CD38-, CD22+ (dim/mod), CD43+, FMC7-. Consistent with CLL    04/26/2022:  Bone marrow biopsy-marrow -trilineage hematopoiesis with  10-20% B-cell infiltrate, C5 positive B-cell population with immunophenotype typical for CLL.  CLL fish within normal limits, heavy chain mutation status cannot be determined.  6q, chromosome 12, chromosome 13, chromosome 11, chromosome 17 and t( 11;14) abnormalities not detected.    05/06/2022 left axillary lymph node core biopsy:  Mantle cell lymphoma with a subset consistent with CLL/SLL., CD5 positive monoclonal B-cell population is identified, approximately 91% of total cells, immuno phenotype is nonspecific and may be seen in CLL, marginal zone lymphoma, or large cell lymphoma.  A 2nd small monoclonal B-cell population approximately 2% of the cells identified with an immunophenotype consistent with CLL.  New genomic consultation lymphoma cells positive for CD 20, CD 5, cyclin D1 and BCL2 with a TI 67 of 10%.  There is no significant staining for CD10, BCL6.  However workup consistent with diagnosis of mantle cell lymphoma.        ROS  CONSTITUTIONAL: no fevers, no chills,  No weight loss, no  fatigue, no weakness  HEMATOLOGIC: no abnormal bleeding, no abnormal bruising, no drenching night sweats  ONCOLOGIC: no new masses or lumps  HEENT: no vision loss, no tinnitus or hearing loss, no nose bleeding, no dysphagia, no odynophagia  CVS: no chest pain, no palpitations, no dyspnea on exertion  RESP: no shortness of breath, no hemoptysis, no cough  GI: no nausea, no vomiting, no diarrhea, no constipation, no melena, no hematochezia, no hematemesis, no abdominal pain, no increase in abdominal girth  : no dysuria, no hematuria, no hesitancy, no scrotal swelling, no discharge  INTEGUMENT: no rashes, no abnormal bruising, no nail pitting, no hyperpigmentation  NEURO: no falls, no memory loss, no paresthesias or dysesthesias, no urofecal incontinence or retention, no loss of strength on any extremity  MSK: no back pain, no new joint pain, no joint swelling  PSYCH: no suicidal or homicidal ideation, no depression, no  insomnia, no anhedonia  ENDOCRINE: no heat or cold intolerance, no polyuria, no polydipsia      Physical Exam     Vitals:    10/26/22 0833   BP: 128/83   Pulse: 73   Resp: 18   Temp: 98.5 °F (36.9 °C)         GEN: AAOx3, NAD  HEENT: PERRLA, EOMI, edentulous, no oral ulcers  NECK:  Supple, full range of motion  LYMPH:  No axillary, supra clavicle or cervical adenopathy appreciated on today's exam.  CVS: s1s2 RRR, no M/R/G, port in place in right chest wall without surrounding edema or erythema.  RESP: CTA b/l, no crackles, no wheezes or rhonchi  ABD: soft, NT, ND, BS+, no hepatomegaly, mild splenomegaly  EXT: no deformities, right lower extremity edema.  SKIN:  No rash,.  No evidence of petechia.  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  Positive for CLL.  PET-CT with evidence of bulky lymphadenopathy, largest measuring 14 cm, spleen measuring 15 cm.  Discussed with patient and his family the diagnosis, natural history and treatment options.  Discussed the risk for TLS given bulky lymphadenopathy, perforation given lymphadenopathy around the bowel loops as well as declining ECOG given his advanced age.  Continuet PJP prophylaxis with Bactrim Monday Wednesday Friday and valacyclovir with treatment.  Continue allopurinol 300 mg q.day for TLS prophylaxis.  Patient is status post cycle 1 of bendamustine and rituximab on 05/18/2022.  Patient has bendamustine was reduced by 25% and rituximab was given 1 week apart to reduce the risk of perforation.  S/p cycle 2 and cycle 3 without significant side effects, dosed for bendamustine increased to 100%.  Discussed with patient interim PET-CT with excellent response to treatment however with residual hypermetabolism in right axillary lymph node with a  Deauville score of 4.  Given his excellent tolerance of treatment, will proceed to complete 6 cycles of BR in the setting of above PET-CT report.  Discussed alternate treatment plan to proceed with ibrutinib given partial response.  Patient elected to proceed with another 2 cycles of BR with plans to repeat PET-CT after completion of treatment to assess treatment response.  Cycle 5 with dose reduction by 20% given thrombocytopenia.  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     Based on available information, likely SLL/CLL with some constitutional symptoms including weight loss, fatigue and night sweats. Adenopathy easily palpable above and below the diaphragm. Mild thrombocytopenia w/ PLT count ~120k and very mild anemia with Hg ~12 g/dl. No hypercalcemia, no evidence of immune paresis  Biopsies 4/11/22 with Dr Brannon were FNAs and non diagnostic for CLL. I do not know what IHC was obtained or whether pathologist knew what the potential diagnosis was as report only says negative for carcinoma. Will discuss with Dr Lan to add CD5, CD10, CD19, CD20 etc if enough tissue  Regardless, will also refer for axillary LN biopsy, either excisional or at the very least core. Referral sent today  PET CT 4/2022 findings are noted. Given his advanced age he was not refer for EGD. Masses in abdomen are likely matted gurinder conglomerates and I do not think it would be worth it to attempt a biopsy of those  CLL FISH  Negative , IGHV mutation status  Hep B and C negative  Allopurinol 300mg daily with PO hydration prior to initiation of any therapy  Given concomitant diagnosis of CLL and mantle cell lymphoma will plan to initiate treatment for mantle cell lymphoma and CLL with bendamustine rituximab.  Will consider BTK inhibitors if patient is intolerant to bendamustine/rituximab in the second-line    Plan  Reviewed labs, okay to proceed with cycle 6 dose  Plan to complete 6 cycles of  chemotherapy with repeat PET-CT to assess treatment response.  Follow-up in 4 weeks with repeat PET-CT  Dermatology follow-up for skin tags/lesion, probable seborrheic keratoses.      Portions of the record may have been created with voice recognition software. Occasional wrong-word or sound-a-like substitutions may have occurred due to the inherent limitations of voice recognition software. Read the chart carefully and recognize, using context, where substitutions have occurred.

## 2022-11-23 ENCOUNTER — OFFICE VISIT (OUTPATIENT)
Dept: HEMATOLOGY/ONCOLOGY | Facility: CLINIC | Age: 82
End: 2022-11-23
Payer: MEDICARE

## 2022-11-23 ENCOUNTER — SPECIALTY PHARMACY (OUTPATIENT)
Dept: PHARMACY | Facility: CLINIC | Age: 82
End: 2022-11-23

## 2022-11-23 VITALS
HEIGHT: 65 IN | HEART RATE: 70 BPM | SYSTOLIC BLOOD PRESSURE: 134 MMHG | BODY MASS INDEX: 28.26 KG/M2 | RESPIRATION RATE: 18 BRPM | TEMPERATURE: 98 F | DIASTOLIC BLOOD PRESSURE: 92 MMHG | OXYGEN SATURATION: 99 % | WEIGHT: 169.63 LBS

## 2022-11-23 DIAGNOSIS — C83.13 MANTLE CELL LYMPHOMA OF INTRA-ABDOMINAL LYMPH NODES: Primary | ICD-10-CM

## 2022-11-23 DIAGNOSIS — C83.13 MANTLE CELL LYMPHOMA OF INTRA-ABDOMINAL LYMPH NODES: ICD-10-CM

## 2022-11-23 PROCEDURE — 99215 OFFICE O/P EST HI 40 MIN: CPT | Mod: ,,,

## 2022-11-23 PROCEDURE — 99215 PR OFFICE/OUTPT VISIT, EST, LEVL V, 40-54 MIN: ICD-10-PCS | Mod: ,,,

## 2022-11-23 RX ORDER — ALLOPURINOL 300 MG/1
300 TABLET ORAL DAILY
Qty: 30 TABLET | Refills: 5 | Status: SHIPPED | OUTPATIENT
Start: 2022-11-23

## 2022-11-23 RX ORDER — PREDNISONE 10 MG/1
TABLET ORAL
COMMUNITY
End: 2022-12-05

## 2022-11-23 NOTE — PROGRESS NOTES
DATE:  06/22/2022  PROBLEM:   Mixed hematological/lymph 0 reticular malignancy including;  1. )  CLL/SLL  2. )  Mantle cell lymphoma     HPI: 80 y/o M w/ PMHx of HTN, HLD, GERD, CAD, PORTER referred to [facility] for lymphocytosis and lymphadenopathy    Of note, review of his records reveals a flow cytometry sent in 2018 that was consistent with CLL. He also had a single visit with Dr Snow in 2018 but never followed back.   Patient had a PET-CT as well as a bone marrow biopsy and repeat peripheral flow cytometry with a diagnosis of CLL/SLL and mantle cell lymphoma.  Patient had symptoms of fatigue and weight loss prior to his workup and diagnosis.    Today, 11/23/2022, patient denies any acute concerns today.  He reports recovering back to his baseline after his prior episode of COVID.  He denies any fevers, chills, drenching night sweats, new lumps or bumps, decreased appetite or weight loss.  He does report a skin tag/lesion over his right temporal region which has been growing since the last few months. He will have that removed next week.   He denies any other skin rashes/lesions.    PMHx: HTN, HLD, GERD, CAD, PORTER  PSHx: PCI x6, lipoma, hernia  Social Hx: never smoker, no ETOH, no drugs. He reports prior exposures to asbestos and pesticides, does not know which ones  Family Hx: father: colon cancer, sister: breast cancer, colon cancer  Meds: reviewed  Allergies: NKDA    Labs:  11/22/22: cr 0.86, alb 3.7, LFTs WNL, , uric acid 5.1, wbc 2.16, hgb 12.8, plt 76, mcv 107.8, ALC 0.35, ANC 1.11  10/25/2022:  Creatinine 0.89, albumin 3.5, LFTs within normal limits, , uric acid 3.6.  WBC count 3.15, hemoglobin 12.7, .3, platelet count 125 1000, ANC 2.28.  10/11/2022:  Creatinine 1.04, albumin 3.6, LFTs within normal limits, magnesium 2.0, phosphorus 2.7, , uric acid 3.2, WBC 2.4, hemoglobin 13.4, .5, platelet count 58161.  ANC 1.77.  09/13/2022:  Creatinine 0.80, albumin 3.7, LFTs within  normal limits, , uric acid 4.0, WBC count 2.37, hemoglobin 13.2, , platelet count 44502, ANC 1.45.  2022:  Creatinine 0.81, albumin 3.8, calcium 9.2, total bili 0.5, LFTs within normal limits, uric acid 3.3, , WBC 3.45, hemoglobin 13.7, .8, platelet count 50718, ANC 2.06.  2022:  WBC 3.75, hemoglobin 13.1, .5, platelet count 26412, ANC 2.32, uric acid 4.1, , creatinine 1.09, albumin 3.5, calcium 9.6, LFTs within normal limits.  2022 WBC at 2.58 bed neutrophil adequate at 55%.  Hemoglobin 12.7, hematocrit 39.4.  2022:  Creatinine 0.99, calcium 9.1, , uric acid 3.7, phosphorus 3.3, potassium 4.5, WBC count 2.58, hemoglobin 12.7, platelet count 88874, ANC 1.44.  2022:  Potassium 4.9, sodium 140, creatinine 0.87, , uric acid 6.6.  2022:  Creatinine 1.03, albumin 3.3, ,  WBC count 10.32, hemoglobin 12.4, platelet count 135 1000.   2022:  Creatinine 0.93, albumin 3.6, total protein 6.4, , potassium 5.2, WBC count 9.6, hemoglobin 11.4, .4, platelet count 92.   4/ Cr 0.89 Alb 3.4 TP 6.4 Ca 9.4  TSH 2.0 Iron 58n TIBC 285 Ferritin 48 Folate 9.9 B12 426 Hep B s ag neg Uric acid 7.8 Hep C ab neg WBC 9.79 RBC 3.77 Hg 11.8 .5 RDW 13.4 PLT 91 ANC 2.79 ALC 6.36 Direct Ramos neg b2 micro 7.5 Copper 135 Hapto 225 Hep B core ab neg SPEP w/ ARABELLA normal Abs kappa 32 Abs lambda 124 SFLC ratio 0.26  3/18/22 Cr 1 AST 27 ALT 12 AlkPhos 87 Alb 4.1 TP 6.5 Ca 9.8 WBC 9.4 RBC 3.92 Hg 12.2 MCV 96 RDW 15.1  ALC 5.8 ANC 3  5/15/18 WBC 14.71 RBC 4.42 Hg 14.3 MCV 98.6 RDW 13  ANC 2.91 ALC 10.13 AMC 1.29    Imagin/7/22 PET CT: Stable right axillary lymph node with mild-to-moderate uptake, representing residual metabolically active tumor. No new suspicious uptake noted    2022 PET-CT:  Excellent response to therapy since her last exam with interval resolution or near complete resolution  of multiple hypermetabolic lymph nodes above and below the diaphragm.  There is residual mild-to-moderate uptake in the right axillary lymph node, Deauville score 4  Interval resolution of diffuse uptake in spleen which is also decreased in size.  No new suspicious uptake.    05/10/22:-  right lower extremity Doppler negative for DVT      4/13/22 PET CT: multiple hypermetabolic LNs b/l neck. Right submandibular LN .7x1.7cm, SUV 7.8. Left submandibular LN 2.3x1.7cm SUV 7.2. Multiple hypermetabolic LNs in chest. Right axillary node 3.3x2cm SUV 8.1. Hypermetabolic left axillary LN SUV 6.4, 1.9x3.2cm. Hypermetabolic right paratracheal LN 2.4x1.6cm SUV 4.9. Right pulmonary hilum SUV 2.7. Subcarinal LN SUV 3.3, 1.2x3.3cm. Spleen enlarged at 15cm and demonstrates heterogeneous mild to moderate uptake SUV 5.2. Moderately intense activity in stomach and proximal duodenum with no degree of focality or discrete mass on CT. Multiple hypermetabolic LNs in abd and pelvis. Left periaortic LN SUV 6.8, 4x3.5cm. Large hypermetabolic mesenteric mass [id number] SUV 8.5. Aortocaval LN 3.5x3.8cm SUV 5.1. Hypermetabolic mass in left pelvis 5.5x7cm SUV 7. Left external iliac LN SUV 6 2.2x2.1cm. Extensive hypermetabolism anteriorly and laterally in left abdomen, associated with soft tissue thickening that is difficult to separate from bowel loops. B/l inguinal LNs up to 2.9x3.9cm, SUV 6.6    3/29/22 CT neck w/ and w/o contrast: widespread adenopathy identified in parotic glands bilateral, submandibular space, anterior and posterior cervical chains, superior mediastinum and supraclavicular and infraclavicular spaces of the base of neck/upper chest in appearance strongly suspicious for lymphoma. Multiple subcutaneous masses are also identified in lower occipital scalp and upper neck similar to lymphadenopathy. Biopsy of prominent subcutaneous mass in right upper neck is recommended and should present the easiest location for tissue  sampling    Path:  5/23/18 peripheral blood flow cytometry: CD5+ B cell population. No abnormal T cell or myeloid cell populations. CD19+, CD20+, CD5+, CD23+, CD11c+ surface kappa light chain+, CD38-, CD22+ (dim/mod), CD43+, FMC7-. Consistent with CLL    04/26/2022:  Bone marrow biopsy-marrow -trilineage hematopoiesis with 10-20% B-cell infiltrate, C5 positive B-cell population with immunophenotype typical for CLL.  CLL fish within normal limits, heavy chain mutation status cannot be determined.  6q, chromosome 12, chromosome 13, chromosome 11, chromosome 17 and t( 11;14) abnormalities not detected.    05/06/2022 left axillary lymph node core biopsy:  Mantle cell lymphoma with a subset consistent with CLL/SLL., CD5 positive monoclonal B-cell population is identified, approximately 91% of total cells, immuno phenotype is nonspecific and may be seen in CLL, marginal zone lymphoma, or large cell lymphoma.  A 2nd small monoclonal B-cell population approximately 2% of the cells identified with an immunophenotype consistent with CLL.  New genomic consultation lymphoma cells positive for CD 20, CD 5, cyclin D1 and BCL2 with a TI 67 of 10%.  There is no significant staining for CD10, BCL6.  However workup consistent with diagnosis of mantle cell lymphoma.        ROS  CONSTITUTIONAL: no fevers, no chills,  No weight loss, no  fatigue, no weakness  HEMATOLOGIC: no abnormal bleeding, no abnormal bruising, no drenching night sweats  ONCOLOGIC: no new masses or lumps  HEENT: no vision loss, no tinnitus or hearing loss, no nose bleeding, no dysphagia, no odynophagia  CVS: no chest pain, no palpitations, no dyspnea on exertion  RESP: no shortness of breath, no hemoptysis, no cough  GI: no nausea, no vomiting, no diarrhea, no constipation, no melena, no hematochezia, no hematemesis, no abdominal pain, no increase in abdominal girth  : no dysuria, no hematuria, no hesitancy, no scrotal swelling, no discharge  INTEGUMENT: no  rashes, no abnormal bruising, no nail pitting, no hyperpigmentation  NEURO: no falls, no memory loss, no paresthesias or dysesthesias, no urofecal incontinence or retention, no loss of strength on any extremity  MSK: no back pain, no new joint pain, no joint swelling  PSYCH: no suicidal or homicidal ideation, no depression, no insomnia, no anhedonia  ENDOCRINE: no heat or cold intolerance, no polyuria, no polydipsia      Physical Exam     Vitals:    11/23/22 1342   BP: (!) 134/92   Pulse: 70   Resp: 18   Temp: 98.4 °F (36.9 °C)           GEN: AAOx3, NAD  HEENT: PERRLA, EOMI, edentulous, no oral ulcers  NECK:  Supple, full range of motion  LYMPH:  No axillary, supra clavicle or cervical adenopathy appreciated on today's exam.  CVS: s1s2 RRR, no M/R/G, port in place in right chest wall without surrounding edema or erythema.  RESP: CTA b/l, no crackles, no wheezes or rhonchi  ABD: soft, NT, ND, BS+, no hepatomegaly, mild splenomegaly  EXT: no deformities, right lower extremity edema.  SKIN:  No rash,.  No evidence of petechia.  NEURO: normal mentation, strength 5/5 on all 4 extremities, no sensory deficits      ASSESSMENT:    # Mantle cell lymphoma, [place]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  Positive for CLL.  PET-CT with evidence of bulky lymphadenopathy, largest measuring 14 cm, spleen measuring 15 cm.  Discussed with patient and his family the diagnosis, natural history and treatment options.  Discussed the risk for TLS given bulky lymphadenopathy, perforation given lymphadenopathy around the bowel loops as well as declining ECOG given his advanced age.  Continuet PJP prophylaxis with Bactrim Monday Wednesday Friday and valacyclovir with treatment.  Continue allopurinol 300 mg q.day for TLS prophylaxis.  Patient is status post cycle 1 of bendamustine and rituximab on 05/18/2022.  Patient has bendamustine was  reduced by 25% and rituximab was given 1 week apart to reduce the risk of perforation.  S/p cycle 2 and cycle 3 without significant side effects, dosed for bendamustine increased to 100%.  Discussed with patient interim PET-CT with excellent response to treatment however with residual hypermetabolism in right axillary lymph node with a Deauville score of 4.  Given his excellent tolerance of treatment, will proceed to complete 6 cycles of BR in the setting of above PET-CT report.  Discussed alternate treatment plan to proceed with ibrutinib given partial response.  Patient elected to proceed with another 2 cycles of BR with plans to repeat PET-CT after completion of treatment to assess treatment response.  Cycle 5 with dose reduction by 20% given thrombocytopenia.  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     Based on available information, likely SLL/CLL with some constitutional symptoms including weight loss, fatigue and night sweats. Adenopathy easily palpable above and below the diaphragm. Mild thrombocytopenia w/ PLT count ~120k and very mild anemia with Hg ~12 g/dl. No hypercalcemia, no evidence of immune paresis  Biopsies 4/11/22 with Dr Brannon were FNAs and non diagnostic for CLL. I do not know what IHC was obtained or whether pathologist knew what the potential diagnosis was as report only says negative for carcinoma. Will discuss with Dr Lan to add CD5, CD10, CD19, CD20 etc if enough tissue  Regardless, will also refer for axillary LN biopsy, either excisional or at the very least core. Referral sent today  PET CT 4/2022 findings are noted. Given his advanced age he was not refer for EGD. Masses in abdomen are likely matted gurinder conglomerates and I do not think it would be worth it to attempt a biopsy of those  CLL FISH  Negative , IGHV mutation status  Hep B and C negative  Allopurinol 300mg daily with PO hydration prior to initiation of any therapy  Given  concomitant diagnosis of CLL and mantle cell lymphoma will plan to initiate treatment for mantle cell lymphoma and CLL with bendamustine rituximab.  Will consider BTK inhibitors if patient is intolerant to bendamustine/rituximab in the second-line    PET CT discussed with Dr. Costa and will proceed with ibrutinib as second line    Plan  Chemo teaching next week  [facility]brutinib sent to [facility]  Plan to start ibrutinib 560 mg once insurance approved and received   Patient to contact clinic once medication delivered before taking.  Repeat cbc prior to start to recheck plts   Will plan for toxicity check and repeat labs after 2 weeks from start date.     Dermatology follow-up for skin tags/lesion, probable seborrheic keratoses.        Heather Babin, DONNA

## 2022-11-25 NOTE — TELEPHONE ENCOUNTER
No answer/unable to lvm. Call attempt to patient regarding Imbruvica prescription we received. Calling to inform patient Imbruvica has been approved through insurance with a $2,969.24 copay. No grants are currently available at this time but am able to apply patient for  assistance. WILL NEED HH SIZE, ANNUAL INCOME, & HOW PT WANTS TO RECEIVE THEIR PORTION OF APPLICATION. Will continue to follow up.

## 2022-11-25 NOTE — TELEPHONE ENCOUNTER
Received rx for Imbruvica for mantle cell lymphoma, dose appropriate. PA submitted as urgent through [facility] (Key: [id number])

## 2022-11-25 NOTE — TELEPHONE ENCOUNTER
PA for Imbruvica approved from 11/23/22 - 12/31/2039. Estimated copay $2,969.24, forwarding to financial assistance

## 2022-11-29 NOTE — TELEPHONE ENCOUNTER
Outgoing call to patient to patient regarding Imbruvica prescription we received. Patients wife answered and requested that I call patients daughter who handles patients prescriptions.    No answer/lvm. Call attempt to patients daughter Angelica to inform her that Imbruvica has been approved through patients insurance with a $2,969.24 copay. No grants are currently available at this time but am able to apply patient for  assistance. WILL NEED HH SIZE, ANNUAL INCOME, & HOW PT WANTS TO RECEIVE THEIR PORTION OF APPLICATION. Will continue to follow up.

## 2022-11-29 NOTE — TELEPHONE ENCOUNTER
Sent a staff message to [email or web address] regarding [employer] assistance application and faxed application prescription to [phone] for provider review and signature.    Emailed patient portion of assistance application to provided email address for patient to review, sign and return to [facility]. Will continue to follow up.

## 2022-11-29 NOTE — TELEPHONE ENCOUNTER
Incoming call form pt daughter, she provided consent and hhs/income. Would prefer application sent to email [email or web address]    Informed Maday

## 2022-12-01 NOTE — TELEPHONE ENCOUNTER
No answer/lvm. Outgoing call to patient to check status of his portion of assistance application - emailed to provided email address on 11/29 for patient to sign and return to [facility]. Will continue to follow up.

## 2022-12-01 NOTE — PATIENT INSTRUCTIONS
Imbruvica (Ibrutinib)  Instructions for regimen/reaction prevention  Take Imbruvica with or without food at about the same time every night with a large glass of water. No other medications at the time you take the Imbruvica. Swallow whole. Do not cut, crush or chew tablets. Take Imbruvica at about the same times every day. If you miss a dose of Imbruvica or vomiting occurs, skip that dose and take your next dose at your regular scheduled time. Space medicine one hour before or 2 hours after your Imbruvica dose.  If you stop taking Imbruvica or dosage changes, bring any pills left over to the [facility] for disposal. Cannot dispose in the garbage or flush down the toilet.  2.  If experiencing nausea: Ondansetron (Zofran) 4mg 1 tab one hour before Imbruvica (Ibrutinib).  Continue taking 4 times a day as needed for nausea throughout treatment.  Continue:  Valacyclovir 500mg tab 1 twice a day  Bactrim DS tab 1 Daily on Pprkdm-[id number]-[id number]  Avoid:  Grapefruit or grapefruit juice  Nausea: Ondansetron (Zofran) 4mg three times a day as needed for nausea       Rash: Moisturize with lotion or cream. Benadryl by mouth for itching.  Heartburn or Indigestion: Tums, Mylanta, Rolaids, Pepcid AC or Prilosec  Pain: Tylenol for mild pain   Loose bowels (diarrhea): Over the counter Imodium (loperamide)  Constipation: Stool softener (Dulcolax, Senokot S, Colace, Surfak, Miralax) may take generic of any medication.  Muscle cramps: may take over the counter calcium, or drink tonic water.  Mouth rinse 3 to 4 times a day. Swish and spit every day while taking Imbruvica  1 cup warm water  ½ tsp salt & ½ tsp baking soda  And/or Biotene toothpaste and/or mouthwash for dry mouth or any alcohol free mouthwash    Drink 4 to 6 pints of liquids per day throughout treatment (non-alcohol, non-caffeinated liquids count as a liquid)    Most common side effects: low platelets or white blood cells; tiredness, loose bowels, nausea, rash, muscle  "and bone pain.        Imbruvica   Generic Name: Ibrutinib  Imbruvica is the trade name for the generic drug ibrutinib. In some cases, health care professionals may use the generic name ibrutinib when referring to the trade name Imbruvica.   Drug Type:   Imbruvica is a targeted therapy. Imbruvica inhibits the function of Bruton's tyrosine kinase (BTK). (For more detail, see "How this drug works," below.)   What Imbruvica Is Used For  Chronic zylov-mdqnpz-qedj (cGVHD) disease after failure of one or more prior lines of systemic therapy.  Chronic lymphocytic leukemia (CLL) who have received at least one prior therapy.  Mantle cell lymphoma (MCL) who have received at least one prior therapy.  Marginal zone lymphoma (MZL) who require systemic therapy and have received at least one prior anti-CD20-based therapy.  Waldenstrom's macroglobulinemia  Note: If a drug has been approved for one use, physicians may elect to use this same drug for other problems if they believe it may be helpful.   How Imbruvica Is Given:  Imbruvica is a capsule, taken by mouth once daily. You may take 1-4 capsules at once depending on your prescribed dose.  Take Imbruvica at approximately the same time each day.   Take Imbruvica exactly as prescribed.   Swallow Imbruvica capsules whole with at least 8 ounces of water. Do not crush, open, chew or dissolve capsules.   Do not change your dose or stop Imbruvica unless your health care provider tells you to.   If you miss a dose of Imbruvica, it should be taken as soon as possible on the same day with return to the normal schedule the following day. Do not take extra capsules of Imbruvica to make up the next dose.  Do not take more than 1 dose of Imbruvica at one time. Call your health care provider and go to the emergency room right away if you take too much.   Store at room temperature (68° F to 77° F); keep medication in original bottle; protect from high humidity, moisture and light. "   Keep out of reach from children and pets.   Safely throw away any Imbruvica that is out of date or unused (ask your provider for directions on how to discard the medication).   The amount of Imbruvica that you will receive depends on many factors, including your general health or other health problems, and the type of cancer or condition you have. Your doctor will determine your exact Imbruvica dosage and schedule.   Side Effects:  Important things to remember about the side effects of Imbruvica:   Most people will not experience all of the Imbruvica side effects listed.   Imbruvica side effects are often predictable in terms of their onset, duration, and severity.   Imbruvica side effects are almost always reversible and will go away after therapy is complete.   Imbruvica side effects may be quite manageable. There are many options to minimize or prevent the side effects of Imbruvica.   The following side effects are common (occurring in greater than 30%) for patients taking Imbruvica:  Decreased platelets  Diarrhea  Decreased neutrophils  Decreased hemaglobin  Fatigue  Musculoskeletal pain  Swelling  Upper respiratory tract infection  Nausea  Bruising  These are less common side effects (occurring in 10-29%) for patients receiving Imbruvica:  Shortness of breath  Constipation  Rash  Abdominal pain  Vomiting  Decreased appetite  Cough  Fever  Stomatitis  Dizziness  Urinary Tract Infection  Pneumonia  Skin infections  Asthenia  Muscle spasms  Sinusitis  Headache  Dehydration  Dyspepsia  Petechiae  Arthralgia  Nosebleeds  Not all side effects are listed above. Some that are rare (occurring in less than about 10 percent of patients) are not listed here. Always inform your health care provider if you experience any unusual symptoms.   When to contact your doctor or health care provider:  Contact your health care provider immediately, day or night, if you should experience any of the following symptoms:    Fever of 100.4° F (38° C or higher, chills)   Shortness of breath, cough or trouble breathing   Any bleeding that won't stop  The following symptoms require medical attention, but are not an emergency. Contact your doctor or health care provider within 24 hours of noticing any of the following:  Diarrhea (4-6 episodes in a 24-hour period).   Blood in your stools or black stools  Headache that lasts a long time, confusion, change in your speech  Nausea (interferes with ability to eat and unrelieved with prescribed medication).   Vomiting (vomiting more than 4-5 times in a 24 hour period).   Unable to eat or drink for 24 hours or have signs of dehydration: tiredness, thirst, dry mouth, dark and decrease amount of urine, or dizziness.   Skin or the whites of your eyes turn yellow   Urine turns dark or brown (tea color)   Decreased appetite   Pain on the right side of your stomach   Bleed or bruise more easily than normal   Any skin or nail changes (rash, itching, severe dryness, blisters, nail infection, inflammation of the lips, etc.)   Cough with or without mucus   Mouth sores   Pain or burning with urination   Extreme fatigue (unable to carry on self-care activities)   Always inform your doctor or health care provider if you experience any unusual symptoms.  Precautions:  Do not drink grapefruit juice, eat grapefruit or eat [place] oranges (often used in marmalades) while you are taking Imbruvica. These products may increase the amount of Imbruvica in your blood.  Before starting Imbruvica treatment, make sure you tell your doctor about any other medications you are taking (including prescription, over-the-counter, vitamins, herbal remedies, etc.).   While taking Imbruvica, do not receive any kind of immunization or vaccination without your doctor's approval.   Limit your time in the sun. Imbruvica can make your skin sensitive to sunlight. A severe sunburn, rash or worsening acne can occur with too much  exposure. Remember to use sunscreen and wear a hat and clothes that cover as much of your skin as possible while taking Imbruvica.  Inform your health care professional if you are pregnant or may be pregnant prior to starting this treatment. Pregnancy category D (may be hazardous to the fetus. Women who are pregnant or become pregnant must be advised of the potential hazard to the fetus.)   For both men and women: Barrier methods of contraception, such as condoms, are recommended during therapy and for at least 2 weeks after treatment is complete. Discuss with your doctor when you may safely become pregnant or conceive a child after therapy.   Do not breast feed while taking Imbruvica.   Self-Care Tips:  While taking Imbruvica, drink at least two to three quarts of fluid every 24 hours, unless you are instructed otherwise.   Wash your hands often and after taking each dose of Imbruvica.   You may be at risk of infection so try to avoid crowds or people with colds, and report fever or any other signs of infection immediately to your health care provider.   To help treat/prevent mouth sores while taking Imbruvica, use a soft toothbrush, and rinse three times a day with 1 teaspoon of baking soda mixed with 8 ounces of water.   Use an electric razor and a soft toothbrush to minimize bleeding.   Avoid contact sports or activities that could cause injury.   To reduce nausea, take anti-nausea medications as prescribed by your doctor, and eat small, frequent meals while taking Imbruvica.   Eat foods that may help reduce diarrhea-see   Follow regimen of anti-diarrhea medication as prescribed by your health care professional.   Managing Side Effects - Diarrhea   Avoid sun exposure. Wear SPF 15 (or higher) sun block and protective clothing. Imbruvica may make you more sensitive to the sun and you may sunburn more easily.   In general, drinking alcoholic beverages should be kept to a minimum or avoided completely while  "you are taking Imbruvica. You should discuss this with your doctor.   Get plenty of rest.   Maintain good nutrition while being treated with Imbruvica.   If you experience symptoms or side effects while being treated with Imbruvica, be sure to discuss them with your health care team. They can prescribe medications and/or offer other suggestions that are effective in managing such problems.   Monitoring and Testing:  You will be checked regularly by your doctor while you are taking Imbruvica to monitor side effects and check your response to therapy. Periodic blood work will be obtained to monitor the function of your organs (such as your kidneys and liver) as well as other side effects.   How Imbruvica Works:  Imbruvica is not a chemotherapy drug but one of what are termed "targeted therapies." Targeted therapy is the result of years of research dedicated to understanding the differences between cancer cells and normal cells. To date, cancer treatment has focused primarily on killing rapidly dividing cells because one feature of cancer cells is that they divide rapidly. Unfortunately, some of our normal cells divide rapidly too, causing multiple side effects.   Targeted therapy is about identifying other features of cancer cells. Scientists look for specific differences in the cancer cells and the normal cells. This information is used to create a targeted therapy to attack the cancer cells without damaging the normal cells, thus leading to fewer side effects. Each type of targeted therapy works a little bit differently but all interfere with the ability of the cancer cell to grow, divide, repair and/or communicate with other cells.   Imbruvica inhibits the function of Bruton's tyrosine kinase (BTK). BTK is a key signaling molecule of the B-cell receptor signaling complex that plays an important role in the survival of malignant B cells. Imbruvica blocks signals that stimulate malignant B cells to grow and " divide uncontrollably.  Research continues to identify which cancers may be best treated with targeted therapies and to identify additional targets for more types of cancer.   Note: We strongly encourage you to talk with your health care professional about your specific medical condition and treatments. The information contained in this web site is meant to be helpful and educational, but is not a substitute for medical advice.    Copied from [email or web address]

## 2022-12-02 PROBLEM — K21.9 GERD (GASTROESOPHAGEAL REFLUX DISEASE): Status: ACTIVE | Noted: 2022-12-02

## 2022-12-02 PROBLEM — I10 HTN (HYPERTENSION), BENIGN: Status: ACTIVE | Noted: 2022-12-02

## 2022-12-02 NOTE — PROGRESS NOTES
Subjective:       Patient ID: Marcus Valero is a 82 y.o. male.      Chief Complaint: Chemotherapy Education      DATE:  2022  PROBLEM:   Mixed hematological/lymph 0 reticular malignancy including;  1. )  CLL/SLL  2. 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 He also had a single visit with Dr Snow in 2018 but never followed back.   Patient had a PET-CT as well as a bone marrow biopsy and repeat peripheral flow cytometry with a diagnosis of CLL/SLL and mantle cell lymphoma.  Patient had symptoms of fatigue and weight loss prior to his workup and diagnosis.     2022 examined by MONICA Babin NP, patient denies any acute concerns today.  He reports recovering back to his baseline after his prior episode of COVID.  He denies any fevers, chills, drenching night sweats, new lumps or bumps, decreased appetite or weight loss.  He does report a skin tag/lesion over his right temporal region which has been growing since the last few months. He will have that removed next week.   He denies any other skin rashes/lesions.    Imagin/7/22 PET CT: Stable right axillary lymph node with mild-to-moderate uptake, representing residual metabolically active tumor. No new suspicious uptake noted     2022 PET-CT:  Excellent response to therapy since her last exam with interval resolution or near complete resolution of multiple hypermetabolic lymph nodes above and below the diaphragm.  There is residual mild-to-moderate uptake in the right axillary lymph node, Deauville score 4  Interval resolution of diffuse uptake in spleen which is also decreased in size.  No new suspicious uptake.     05/10/22:-  right lower extremity Doppler negative for DVT       22 PET CT: multiple hypermetabolic LNs b/l neck. Right  submandibular LN .7x1.7cm, SUV 7.8. Left submandibular LN 2.3x1.7cm SUV 7.2. Multiple hypermetabolic LNs in chest. Right axillary node 3.3x2cm SUV 8.1. Hypermetabolic left axillary LN SUV 6.4, 1.9x3.2cm. Hypermetabolic right paratracheal LN 2.4x1.6cm SUV 4.9. Right pulmonary hilum SUV 2.7. Subcarinal LN SUV 3.3, 1.2x3.3cm. Spleen enlarged at 15cm and demonstrates heterogeneous mild to moderate uptake SUV 5.2. Moderately intense activity in stomach and proximal duodenum with no degree of focality or discrete mass on CT. Multiple hypermetabolic LNs in abd and pelvis. Left periaortic LN SUV 6.8, 4x3.5cm. Large hypermetabolic mesenteric mass [id number] SUV 8.5. Aortocaval LN 3.5x3.8cm SUV 5.1. Hypermetabolic mass in left pelvis 5.5x7cm SUV 7. Left external iliac LN SUV 6 2.2x2.1cm. Extensive hypermetabolism anteriorly and laterally in left abdomen, associated with soft tissue thickening that is difficult to separate from bowel loops. B/l inguinal LNs up to 2.9x3.9cm, SUV 6.6    3/29/22 CT neck w/ and w/o contrast: widespread adenopathy identified in parotic glands bilateral, submandibular space, anterior and posterior cervical chains, superior mediastinum and supraclavicular and infraclavicular spaces of the base of neck/upper chest in appearance strongly suspicious for lymphoma. Multiple subcutaneous masses are also identified in lower occipital scalp and upper neck similar to lymphadenopathy. Biopsy of prominent subcutaneous mass in right upper neck is recommended and should present the easiest location for tissue sampling    Path:  5/23/18 peripheral blood flow cytometry: CD5+ B cell population. No abnormal T cell or myeloid cell populations. CD19+, CD20+, CD5+, CD23+, CD11c+ surface kappa light chain+, CD38-, CD22+ (dim/mod), CD43+, FMC7-. Consistent with CLL     04/26/2022:  Bone marrow biopsy-marrow -trilineage hematopoiesis with 10-20% B-cell infiltrate, C5 positive B-cell population with immunophenotype  typical for CLL.  CLL fish within normal limits, heavy chain mutation status cannot be determined.  6q, chromosome 12, chromosome 13, chromosome 11, chromosome 17 and t( 11;14) abnormalities not detected.     05/06/2022 left axillary lymph node core biopsy:  Mantle cell lymphoma with a subset consistent with CLL/SLL., CD5 positive monoclonal B-cell population is identified, approximately 91% of total cells, immuno phenotype is nonspecific and may be seen in CLL, marginal zone lymphoma, or large cell lymphoma.  A 2nd small monoclonal B-cell population approximately 2% of the cells identified with an immunophenotype consistent with CLL.  New genomic consultation lymphoma cells positive for CD 20, CD 5, cyclin D1 and BCL2 with a TI 67 of 10%.  There is no significant staining for CD10, BCL6.  However workup consistent with diagnosis of mantle cell lymphoma.     Past Medical History:   Diagnosis Date    Lymphoma       Review of patient's allergies indicates:  No Known Allergies     Current Outpatient Medications:     allopurinoL (ZYLOPRIM) 300 MG tablet, Take 1 tablet (300 mg total) by mouth once daily., Disp: 30 tablet, Rfl: 5    aspirin (ECOTRIN) 81 MG EC tablet, Take 81 mg by mouth once daily., Disp: , Rfl:     carvediloL (COREG) 12.5 MG tablet, Take 1 tablet by mouth 2 (two) times a day., Disp: , Rfl:     ezetimibe (ZETIA) 10 mg tablet, Take 10 mg by mouth once daily., Disp: , Rfl:     ondansetron (ZOFRAN) 8 MG tablet, Take 1 tablet (8 mg total) by mouth every 8 (eight) hours as needed for Nausea., Disp: 30 tablet, Rfl: 1    rosuvastatin (CRESTOR) 20 MG tablet, Take 20 mg by mouth once daily., Disp: , Rfl:     sulfamethoxazole-trimethoprim 800-160mg (BACTRIM DS) 800-160 mg Tab, Take 1 tablet by mouth every Mon, Wed, Fri., Disp: 30 tablet, Rfl: 3    traMADoL (ULTRAM) 50 mg tablet, Take 1 tablet by mouth 4 (four) times daily as needed., Disp: , Rfl:     ibrutinib (IMBRUVICA) 560 mg Tab, Take 560 mg by mouth once  daily. (Patient not taking: Reported on 12/5/2022.), Disp: 30 tablet, Rfl: 3    valACYclovir (VALTREX) 500 MG tablet, Take 1 tablet (500 mg total) by mouth 2 (two) times daily., Disp: 60 tablet, Rfl: 3  Review of Systems   Constitutional: Negative.  Negative for appetite change and unexpected weight change.   HENT: Negative.  Negative for mouth sores.    Eyes: Negative.  Negative for visual disturbance.   Respiratory: Negative.  Negative for cough and shortness of breath.    Cardiovascular: Negative.  Negative for chest pain.   Gastrointestinal: Negative.  Negative for abdominal pain and diarrhea.   Endocrine: Negative.    Genitourinary: Negative.  Negative for frequency.   Musculoskeletal: Negative.  Negative for back pain.   Integumentary:  Negative for rash.   Allergic/Immunologic: Negative.    Neurological: Negative.  Negative for headaches.   Hematological: Negative.  Negative for adenopathy.   Psychiatric/Behavioral: Negative.  The patient is not nervous/anxious.    All other systems reviewed and are negative.           ECOG SCORE    1 - Restricted in strenuous activity-ambulatory and able to carry out work of a light nature       No visits with results within 1 Week(s) from this visit.   Latest known visit with results is:   No results found for any previous visit.            Chemotherapy Patient Education  Education  Chemo Medications: Ibrutinib (Imbruvica)  Intoduction to Unit Hours, Services, Routines, After Hours Telephone Number: Verbal Instructions given to patient/family, Written Instructions given to patient/family, Patient/Family verbalizes understanding  Chemo Teaching Packet: Verbal Instructions given to patient/family, Written Instructions given to patient/family, Patient/Family verbalizes understanding  Community Resources: Verbal Instructions given to patient/family, Patient/Family verbalizes understanding  Dental Care During Chemo: Verbal Instructions given to patient/family, Written  Instructions given to patient/family, Patient/Family verbalizes understanding  Bowel Prophylaxis/Protocol: Verbal Instructions given to patient/family, Written Instructions given to patient/family, Patient/Family verbalizes understanding    Instructions in expected side effects  Nausea/Vomiting : Verbal Instructions given to patient/family, Written Instructions given to patient/family, Patient/Family verbalizes understanding  Myelosuppression: Verbal Instructions given to patient/family, Written Instructions given to patient/family, Patient/Family verbalizes understanding  Fatigue: Verbal Instructions given to patient/family, Written Instructions given to patient/family, Patient/Family verbalizes understanding  Anorexia: Verbal Instructions given to patient/family, Written Instructions given to patient/family, Patient/Family verbalizes understanding  Diarrhea: Verbal Instructions given to patient/family, Written Instructions given to patient/family, Patient/Family verbalizes understanding  Gastritis: Verbal Instructions given to patient/family, Written Instructions given to patient/family, Patient/Family verbalizes understanding  Instructed to report increase of 2-3 loose stools/day over pretreatment: Verbal Instructions given to patient/family, Written Instructions given to patient/family, Patient/Family verbalizes understanding    S/S Infection   Report Temperature of 100.4 or >: Verbal Instructions given to patient/family, Written Instructions given to patient/family, Patient/Family verbalizes understanding    Skin Care  Rash, Hyperkeratosis: Verbal Instructions given to patient/family, Written Instructions given to patient/family, Patient/Family verbalizes understanding    Nutritional Screening   Nutritional Screening for Dietary Consult:  (Telephone Consultation with Dietician)         Hydration Instructions  Renal Toxicity: Verbal Instructions given to patient/family, Written Instructions given to  patient/family, Patient/Family verbalizes understanding  Drink 2 Liters of fluids daily: Verbal Instructions given to patient/family, Written Instructions given to patient/family, Patient/Family verbalizes understanding    Thrombocytopenia Precautions  Bruising: Verbal Instructions given to patient/family, Written Instructions given to patient/family, Patient/Family verbalizes understanding  Bleeding: Verbal Instructions given to patient/family, Written Instructions given to patient/family, Patient/Family verbalizes understanding    Assessment:       Problem List Items Addressed This Visit          Cardiac/Vascular    HTN (hypertension), benign       Oncology    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 Instructed to notify [facility] when specialty pharmacy notifies patient of Imbruvica shipment.    Treatment Plan: Ibrutinib 560mg tab 1 PO Daily       I spent 15 minutes in preparation for education session reviewing clinic note, labs, scans, pathology and treatment plan.          Established Patient - Audio Only Telehealth Visit     The patient location is: Home  The chief complaint leading to consultation is: Chemotherapy Education  Visit type: Virtual visit with audio only (telephone)  Total time spent with patient: 35minutes       The reason for the audio only service rather than synchronous audio and video virtual visit was related to technical difficulties or patient preference/necessity.     Each patient to whom I provide medical services by telemedicine is:  (1) informed of the relationship between the physician and patient and the respective role of any other health care provider with respect to management of the patient; and (2) notified that they may decline to receive medical services by telemedicine and may withdraw  from such care at any time. Patient verbally consented to receive this service via voice-only telephone call.        This service was not originating from a related E/M service provided within the previous 7 days nor will  to an E/M service or procedure within the next 24 hours or my soonest available appointment.  Prevailing standard of care was able to be met in this audio-only visit.

## 2022-12-05 ENCOUNTER — CLINICAL SUPPORT (OUTPATIENT)
Dept: HEMATOLOGY/ONCOLOGY | Facility: CLINIC | Age: 82
End: 2022-12-05
Payer: MEDICARE

## 2022-12-05 ENCOUNTER — OFFICE VISIT (OUTPATIENT)
Dept: HEMATOLOGY/ONCOLOGY | Facility: CLINIC | Age: 82
End: 2022-12-05
Payer: MEDICARE

## 2022-12-05 DIAGNOSIS — I10 HTN (HYPERTENSION), BENIGN: ICD-10-CM

## 2022-12-05 DIAGNOSIS — K21.9 GASTROESOPHAGEAL REFLUX DISEASE, UNSPECIFIED WHETHER ESOPHAGITIS PRESENT: ICD-10-CM

## 2022-12-05 DIAGNOSIS — C83.13 MANTLE CELL LYMPHOMA OF INTRA-ABDOMINAL LYMPH NODES: Primary | ICD-10-CM

## 2022-12-05 PROCEDURE — 99443 PR PHYSICIAN TELEPHONE EVALUATION 21-30 MIN: ICD-10-PCS | Mod: 95,,, | Performed by: CLINICAL NURSE SPECIALIST

## 2022-12-05 PROCEDURE — 99443 PR PHYSICIAN TELEPHONE EVALUATION 21-30 MIN: CPT | Mod: 95,,, | Performed by: CLINICAL NURSE SPECIALIST

## 2022-12-05 NOTE — NURSING
I spoke with Marcus and his daughter over the phone for patient education. He is changing his treatment. He was previously referred to the [facility]. Today I referred him to the S. He denied feelings of stress or anxiety with the exception related to him not working. He enjoyed working until recently and is trying to adjust to that change. They will reach out if any needs arise.

## 2022-12-05 NOTE — TELEPHONE ENCOUNTER
Outgoing call to patients daughter to check status of patient portion of assistance application. Daughter confirmed they received patient portion and she is currently helping her father fill out paperwork. Daughter says she will return application to [employer] via email to [email or web address]. Will continue to follow up.

## 2022-12-05 NOTE — PROGRESS NOTES
Established Patient - Audio Only Telehealth Visit     The patient location is: at home  The chief complaint leading to consultation is: patient educatoin  Visit type: Virtual visit with audio only (telephone)  Total time spent with patient: 20 minutes       The reason for the audio only service rather than synchronous audio and video virtual visit was related to technical difficulties or patient preference/necessity.     Each patient to whom I provide medical services by telemedicine is:  (1) informed of the relationship between the physician and patient and the respective role of any other health care provider with respect to management of the patient; and (2) notified that they may decline to receive medical services by telemedicine and may withdraw from such care at any time. Patient verbally consented to receive this service via voice-only telephone call.       HPI: patient education     Assessment and plan:  patient education                        This service was not originating from a related E/M service provided within the previous 7 days nor will  to an E/M service or procedure within the next 24 hours or my soonest available appointment.  Prevailing standard of care was able to be met in this audio-only visit.

## 2022-12-05 NOTE — PROGRESS NOTES
Established Patient - Audio Only Telehealth Visit     The patient location is: [place], [place]  The chief complaint leading to consultation is: pt education  Visit type: Virtual visit with audio only (telephone)  Total time spent with patient: 15 min       The reason for the audio only service rather than synchronous audio and video virtual visit was related to technical difficulties or patient preference/necessity.     Each patient to whom I provide medical services by telemedicine is:  (1) informed of the relationship between the physician and patient and the respective role of any other health care provider with respect to management of the patient; and (2) notified that they may decline to receive medical services by telemedicine and may withdraw from such care at any time. Patient verbally consented to receive this service via voice-only telephone call.     This service was not originating from a related E/M service provided within the previous 7 days nor will  to an E/M service or procedure within the next 24 hours or my soonest available appointment.  Prevailing standard of care was able to be met in this audio-only visit.        Oncology Nutrition Evaluation      Marcus Valero   1940    Oncology Provider:   Nazario Brock MD    Reason for Visit:  New Treatment Education    Oncology/Hematology Diagnosis:   CLL/SLL    Treatment Plan:   ibrutinib     Nutrition Recommendations:  1. regular diet as tolerated    Nutrition Assessment    12/5/22: This is a 82 y.o.male with a medical diagnosis of CLL. He reports good appetite and po intake. Did have COVID in September with associated decreased appetite and wt loss, but he gained all his wt back. No currently c/o n/v/c/d.     Nutrition Factors Affecting Intake  none identified    PMHx: HTN, HLD, GERD, CAD    Allergies: Patient has no known allergies.    Current Medications:    Current Outpatient Medications:     allopurinoL (ZYLOPRIM) 300 MG tablet,  "Take 1 tablet (300 mg total) by mouth once daily., Disp: 30 tablet, Rfl: 5    aspirin (ECOTRIN) 81 MG EC tablet, Take 81 mg by mouth once daily., Disp: , Rfl:     carvediloL (COREG) 12.5 MG tablet, Take 1 tablet by mouth 2 (two) times a day., Disp: , Rfl:     ezetimibe (ZETIA) 10 mg tablet, Take 10 mg by mouth once daily., Disp: , Rfl:     ibrutinib (IMBRUVICA) 560 mg Tab, Take 560 mg by mouth once daily. (Patient not taking: Reported on 12/5/2022.), Disp: 30 tablet, Rfl: 3    ondansetron (ZOFRAN) 8 MG tablet, Take 1 tablet (8 mg total) by mouth every 8 (eight) hours as needed for Nausea., Disp: 30 tablet, Rfl: 1    rosuvastatin (CRESTOR) 20 MG tablet, Take 20 mg by mouth once daily., Disp: , Rfl:     sulfamethoxazole-trimethoprim 800-160mg (BACTRIM DS) 800-160 mg Tab, Take 1 tablet by mouth every Mon, Wed, Fri., Disp: 30 tablet, Rfl: 3    traMADoL (ULTRAM) 50 mg tablet, Take 1 tablet by mouth 4 (four) times daily as needed., Disp: , Rfl:     valACYclovir (VALTREX) 500 MG tablet, Take 1 tablet (500 mg total) by mouth 2 (two) times daily., Disp: 60 tablet, Rfl: 3    Labs: none    Anthropometrics    Height:   Ht Readings from Last 1 Encounters:   11/23/22 5' 5" (1.651 m)      Weight:   Wt Readings from Last 3 Encounters:   11/23/22 76.9 kg (169 lb 9.6 oz)   10/26/22 75.8 kg (167 lb 3.2 oz)   10/12/22 73.2 kg (161 lb 6.4 oz)        Usual Body Weight: 75 kg (165 lb)  % Weight Change: 0    BMI: 28.1 (overweight)    Ideal Weight: 61.8 kg (136 lb)  % Ideal Weight: 124%      Nutrition Diagnosis    PES: Food and nutrition related knowledge deficit related to CLL as evidenced by lack of prior exposure to onc nutrition. (resolved)    Nutrition Risk  low    Nutrition Intervention    Interventions(treatment strategy):  Education Provided: antimicrobial/neutropenic  Teaching Method: explanation and printed materials  Comprehension: verbalizes understanding  Barriers to Learning: none evident  Expected Compliance: good  Comments: " All questions were answered and dietitian's contact information was provided.        Nutrition Monitoring and Evaluation    Ongoing monitoring not warranted at this time. Please consult RD prn.        Zonia Costa MS, RD, , LDN

## 2022-12-12 NOTE — TELEPHONE ENCOUNTER
Received patient portion of assistance application and submitted completed assistance application to  for processing and review. Will continue to follow up until final determination is made.

## 2022-12-16 NOTE — TELEPHONE ENCOUNTER
Outgoing call to [facility] to check status of patients assistance application for Imbruvica. Rep confirmed patients application is missing date of provider signature.     Refaxed updated assistance application to  as requested. Will continue to follow up until final determination is made.

## 2022-12-22 NOTE — TELEPHONE ENCOUNTER
Outgoing call to [facility] to check status of patients assistance application for Imbruvica. Rep reports final determination is currently pending due to needing to verify patients income. Informed rep patient submitted his SSI Award Letter with application and he does not file taxes. Rep Confirmed they will be able to proceed with processing application and she provided ETAT of 24 to 48 business hours for final determination. Will continue to follow up until final determination is made.

## 2022-12-27 NOTE — TELEPHONE ENCOUNTER
Patient is approved for [employer] Patient Assistance Program as of 12/23/22 through 12/31/23 and will receive Imbruvica free of charge through the programs dispensing pharmacy, [facility]. Someone from the program's pharmacy will be reaching out to patient to coordinate their first shipment. Patient can also contact [employer] Patient Assistance Program at [phone] to check on the status of their prescription and schedule shipment for medication.        FOR DOCUMENTATION ONLY:  Financial Assistance for Imbruvica is approved from 12/23/22 to 12/31/23  Source: [employer] Patient Assistance Program  Case ID: [id number]  Phone: [phone]  Fax: [phone]   Pharmacy: [facility]    No answer/lvm. Outgoing call to patient to notify him that he has been approved for Imbruvica PAP and would like to provide him with program info and phone number. He does not need to re-enroll for 2023.

## 2023-01-23 ENCOUNTER — OFFICE VISIT (OUTPATIENT)
Dept: HEMATOLOGY/ONCOLOGY | Facility: CLINIC | Age: 83
End: 2023-01-23
Payer: MEDICARE

## 2023-01-23 VITALS
RESPIRATION RATE: 20 BRPM | WEIGHT: 175.5 LBS | TEMPERATURE: 98 F | BODY MASS INDEX: 29.96 KG/M2 | HEIGHT: 64 IN | SYSTOLIC BLOOD PRESSURE: 138 MMHG | HEART RATE: 99 BPM | OXYGEN SATURATION: 99 % | DIASTOLIC BLOOD PRESSURE: 81 MMHG

## 2023-01-23 DIAGNOSIS — C83.13 MANTLE CELL LYMPHOMA OF INTRA-ABDOMINAL LYMPH NODES: Primary | ICD-10-CM

## 2023-01-23 PROCEDURE — 99214 PR OFFICE/OUTPT VISIT, EST, LEVL IV, 30-39 MIN: ICD-10-PCS | Mod: ,,,

## 2023-01-23 PROCEDURE — 99214 OFFICE O/P EST MOD 30 MIN: CPT | Mod: ,,,

## 2023-01-23 NOTE — PROGRESS NOTES
DATE:  06/22/2022  PROBLEM:   Mixed hematological/lymph 0 reticular malignancy including;  1. )  CLL/SLL  2. )  Mantle cell lymphoma     HPI: 82 y/o M w/ PMHx of HTN, HLD, GERD, CAD, PORTER referred to [facility] for lymphocytosis and lymphadenopathy    Of note, review of his records reveals a flow cytometry sent in 2018 that was consistent with CLL. He also had a single visit with Dr Snow in 2018 but never followed back.   Patient had a PET-CT as well as a bone marrow biopsy and repeat peripheral flow cytometry with a diagnosis of CLL/SLL and mantle cell lymphoma.  Patient had symptoms of fatigue and weight loss prior to his workup and diagnosis.    Today, 01/23/23, patient denies any acute concerns today. He is doing well today and has almost completed 4 weeks of imbrutinib.  He denies any fevers, chills, drenching night sweats, new lumps or bumps, decreased appetite or weight loss.  He does report a skin tag/lesion over his right temporal region which has been growing since the last few months. Platelets noted below baseline today. He denies abnormal bleeding/bruising.      PMHx: HTN, HLD, GERD, CAD, PORTER  PSHx: PCI x6, lipoma, hernia  Social Hx: never smoker, no ETOH, no drugs. He reports prior exposures to asbestos and pesticides, does not know which ones  Family Hx: father: colon cancer, sister: breast cancer, colon cancer  Meds: reviewed  Allergies: NKDA    Labs:  01/20/23; cr 0.97, alb 3.5, LFTs WNL, wbc 3.06, hgb 12.8, plt 64, ANC 2.25  11/22/22: cr 0.86, alb 3.7, LFTs WNL, , uric acid 5.1, wbc 2.16, hgb 12.8, plt 76, mcv 107.8, ALC 0.35, ANC 1.11  10/25/2022:  Creatinine 0.89, albumin 3.5, LFTs within normal limits, , uric acid 3.6.  WBC count 3.15, hemoglobin 12.7, .3, platelet count 125 1000, ANC 2.28.  10/11/2022:  Creatinine 1.04, albumin 3.6, LFTs within normal limits, magnesium 2.0, phosphorus 2.7, , uric acid 3.2, WBC 2.4, hemoglobin 13.4, .5, platelet count 31482.   ANC 1.77.  2022:  Creatinine 0.80, albumin 3.7, LFTs within normal limits, , uric acid 4.0, WBC count 2.37, hemoglobin 13.2, , platelet count 88550, ANC 1.45.  2022:  Creatinine 0.81, albumin 3.8, calcium 9.2, total bili 0.5, LFTs within normal limits, uric acid 3.3, , WBC 3.45, hemoglobin 13.7, .8, platelet count 15568, ANC 2.06.  2022:  WBC 3.75, hemoglobin 13.1, .5, platelet count 67994, ANC 2.32, uric acid 4.1, , creatinine 1.09, albumin 3.5, calcium 9.6, LFTs within normal limits.  2022 WBC at 2.58 bed neutrophil adequate at 55%.  Hemoglobin 12.7, hematocrit 39.4.  2022:  Creatinine 0.99, calcium 9.1, , uric acid 3.7, phosphorus 3.3, potassium 4.5, WBC count 2.58, hemoglobin 12.7, platelet count 67078, ANC 1.44.  2022:  Potassium 4.9, sodium 140, creatinine 0.87, , uric acid 6.6.  2022:  Creatinine 1.03, albumin 3.3, ,  WBC count 10.32, hemoglobin 12.4, platelet count 135 1000.   2022:  Creatinine 0.93, albumin 3.6, total protein 6.4, , potassium 5.2, WBC count 9.6, hemoglobin 11.4, .4, platelet count 92.   4/ Cr 0.89 Alb 3.4 TP 6.4 Ca 9.4  TSH 2.0 Iron 58n TIBC 285 Ferritin 48 Folate 9.9 B12 426 Hep B s ag neg Uric acid 7.8 Hep C ab neg WBC 9.79 RBC 3.77 Hg 11.8 .5 RDW 13.4 PLT 91 ANC 2.79 ALC 6.36 Direct Ramos neg b2 micro 7.5 Copper 135 Hapto 225 Hep B core ab neg SPEP w/ ARABELLA normal Abs kappa 32 Abs lambda 124 SFLC ratio 0.26  3/18/22 Cr 1 AST 27 ALT 12 AlkPhos 87 Alb 4.1 TP 6.5 Ca 9.8 WBC 9.4 RBC 3.92 Hg 12.2 MCV 96 RDW 15.1  ALC 5.8 ANC 3  5/15/18 WBC 14.71 RBC 4.42 Hg 14.3 MCV 98.6 RDW 13  ANC 2.91 ALC 10.13 AMC 1.29    Imagin/7/22 PET CT: Stable right axillary lymph node with mild-to-moderate uptake, representing residual metabolically active tumor. No new suspicious uptake noted    2022 PET-CT:  Excellent response to therapy since  her last exam with interval resolution or near complete resolution of multiple hypermetabolic lymph nodes above and below the diaphragm.  There is residual mild-to-moderate uptake in the right axillary lymph node, Deauville score 4  Interval resolution of diffuse uptake in spleen which is also decreased in size.  No new suspicious uptake.    05/10/22:-  right lower extremity Doppler negative for DVT      4/13/22 PET CT: multiple hypermetabolic LNs b/l neck. Right submandibular LN .7x1.7cm, SUV 7.8. Left submandibular LN 2.3x1.7cm SUV 7.2. Multiple hypermetabolic LNs in chest. Right axillary node 3.3x2cm SUV 8.1. Hypermetabolic left axillary LN SUV 6.4, 1.9x3.2cm. Hypermetabolic right paratracheal LN 2.4x1.6cm SUV 4.9. Right pulmonary hilum SUV 2.7. Subcarinal LN SUV 3.3, 1.2x3.3cm. Spleen enlarged at 15cm and demonstrates heterogeneous mild to moderate uptake SUV 5.2. Moderately intense activity in stomach and proximal duodenum with no degree of focality or discrete mass on CT. Multiple hypermetabolic LNs in abd and pelvis. Left periaortic LN SUV 6.8, 4x3.5cm. Large hypermetabolic mesenteric mass [id number] SUV 8.5. Aortocaval LN 3.5x3.8cm SUV 5.1. Hypermetabolic mass in left pelvis 5.5x7cm SUV 7. Left external iliac LN SUV 6 2.2x2.1cm. Extensive hypermetabolism anteriorly and laterally in left abdomen, associated with soft tissue thickening that is difficult to separate from bowel loops. B/l inguinal LNs up to 2.9x3.9cm, SUV 6.6    3/29/22 CT neck w/ and w/o contrast: widespread adenopathy identified in parotic glands bilateral, submandibular space, anterior and posterior cervical chains, superior mediastinum and supraclavicular and infraclavicular spaces of the base of neck/upper chest in appearance strongly suspicious for lymphoma. Multiple subcutaneous masses are also identified in lower occipital scalp and upper neck similar to lymphadenopathy. Biopsy of prominent subcutaneous mass in right upper neck is  recommended and should present the easiest location for tissue sampling    Path:  5/23/18 peripheral blood flow cytometry: CD5+ B cell population. No abnormal T cell or myeloid cell populations. CD19+, CD20+, CD5+, CD23+, CD11c+ surface kappa light chain+, CD38-, CD22+ (dim/mod), CD43+, FMC7-. Consistent with CLL    04/26/2022:  Bone marrow biopsy-marrow -trilineage hematopoiesis with 10-20% B-cell infiltrate, C5 positive B-cell population with immunophenotype typical for CLL.  CLL fish within normal limits, heavy chain mutation status cannot be determined.  6q, chromosome 12, chromosome 13, chromosome 11, chromosome 17 and t( 11;14) abnormalities not detected.    05/06/2022 left axillary lymph node core biopsy:  Mantle cell lymphoma with a subset consistent with CLL/SLL., CD5 positive monoclonal B-cell population is identified, approximately 91% of total cells, immuno phenotype is nonspecific and may be seen in CLL, marginal zone lymphoma, or large cell lymphoma.  A 2nd small monoclonal B-cell population approximately 2% of the cells identified with an immunophenotype consistent with CLL.  New genomic consultation lymphoma cells positive for CD 20, CD 5, cyclin D1 and BCL2 with a TI 67 of 10%.  There is no significant staining for CD10, BCL6.  However workup consistent with diagnosis of mantle cell lymphoma.        ROS  CONSTITUTIONAL: no fevers, no chills,  No weight loss, no  fatigue, no weakness  HEMATOLOGIC: no abnormal bleeding, no abnormal bruising, no drenching night sweats  ONCOLOGIC: no new masses or lumps  HEENT: no vision loss, no tinnitus or hearing loss, no nose bleeding, no dysphagia, no odynophagia  CVS: no chest pain, no palpitations, no dyspnea on exertion  RESP: no shortness of breath, no hemoptysis, no cough  GI: no nausea, no vomiting, no diarrhea, no constipation, no melena, no hematochezia, no hematemesis, no abdominal pain, no increase in abdominal girth  : no dysuria, no hematuria, no  hesitancy, no scrotal swelling, no discharge  INTEGUMENT: no rashes, no abnormal bruising, no nail pitting, no hyperpigmentation  NEURO: no falls, no memory loss, no paresthesias or dysesthesias, no urofecal incontinence or retention, no loss of strength on any extremity  MSK: no back pain, no new joint pain, no joint swelling  PSYCH: no suicidal or homicidal ideation, no depression, no insomnia, no anhedonia  ENDOCRINE: no heat or cold intolerance, no polyuria, no polydipsia      Physical Exam     Vitals:    01/23/23 0931   BP: 138/81   Pulse: 99   Resp: 20   Temp: 97.8 °F (36.6 °C)           GEN: AAOx3, NAD  HEENT: PERRLA, EOMI, edentulous, no oral ulcers  NECK:  Supple, full range of motion  LYMPH:  No axillary, supra clavicle or cervical adenopathy appreciated on today's exam.  CVS: s1s2 RRR, no M/R/G, port in place in right chest wall without surrounding edema or erythema.  RESP: CTA b/l, no crackles, no wheezes or rhonchi  ABD: soft, NT, ND, BS+, no hepatomegaly, mild splenomegaly  EXT: no deformities, right lower extremity edema.  SKIN:  No rash,.  No evidence of petechia.  NEURO: normal mentation, strength 5/5 on all 4 extremities, no sensory deficits      ASSESSMENT:    # Mantle cell lymphoma, Clarence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  Positive for CLL.  PET-CT with evidence of bulky lymphadenopathy, largest measuring 14 cm, spleen measuring 15 cm.  Discussed with patient and his family the diagnosis, natural history and treatment options.  Discussed the risk for TLS given bulky lymphadenopathy, perforation given lymphadenopathy around the bowel loops as well as declining ECOG given his advanced age.  Continuet PJP prophylaxis with Bactrim Monday Wednesday Friday and valacyclovir with treatment.  Continue allopurinol 300 mg q.day for TLS prophylaxis.  Patient is status post cycle 1 of bendamustine and  rituximab on 05/18/2022.  Patient has bendamustine was reduced by 25% and rituximab was given 1 week apart to reduce the risk of perforation.  S/p cycle 2 and cycle 3 without significant side effects, dosed for bendamustine increased to 100%.  Discussed with patient interim PET-CT with excellent response to treatment however with residual hypermetabolism in right axillary lymph node with a Deauville score of 4.  Given his excellent tolerance of treatment, will proceed to complete 6 cycles of BR in the setting of above PET-CT report.  Discussed alternate treatment plan to proceed with ibrutinib given partial response.  Patient elected to proceed with another 2 cycles of BR with plans to repeat PET-CT after completion of treatment to assess treatment response.  Cycle 5 with dose reduction by 20% given thrombocytopenia.  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     Based on available information, likely SLL/CLL with some constitutional symptoms including weight loss, fatigue and night sweats. Adenopathy easily palpable above and below the diaphragm. Mild thrombocytopenia w/ PLT count ~120k and very mild anemia with Hg ~12 g/dl. No hypercalcemia, no evidence of immune paresis  Biopsies 4/11/22 with Dr Brannon were FNAs and non diagnostic for CLL. I do not know what IHC was obtained or whether pathologist knew what the potential diagnosis was as report only says negative for carcinoma. Will discuss with Dr Lan to add CD5, CD10, CD19, CD20 etc if enough tissue  Regardless, will also refer for axillary LN biopsy, either excisional or at the very least core. Referral sent today  PET CT 4/2022 findings are noted. Given his advanced age he was not refer for EGD. Masses in abdomen are likely matted gurinder conglomerates and I do not think it would be worth it to attempt a biopsy of those  CLL FISH  Negative , IGHV mutation status  Hep B and C negative  Allopurinol 300mg daily with PO  hydration prior to initiation of any therapy  Given concomitant diagnosis of CLL and mantle cell lymphoma will plan to initiate treatment for mantle cell lymphoma and CLL with bendamustine rituximab.  Will consider BTK inhibitors if patient is intolerant to bendamustine/rituximab in the second-line    PET CT discussed with Dr. Costa and will proceed with ibrutinib as second line    Plan  Hold imbrutinib for 1 week for platelets below baseline. Plts 64,000  Recheck cbc cmp 1 week and plan to restart imbrutinib at normal dose if platelets back to baseline.  C/w dermatology follow-up for skin tags/lesion, probable seborrheic keratoses.    Patient discussed with Dr. Delmy Babin, [id number]

## 2023-02-02 ENCOUNTER — OFFICE VISIT (OUTPATIENT)
Dept: HEMATOLOGY/ONCOLOGY | Facility: CLINIC | Age: 83
End: 2023-02-02
Payer: MEDICARE

## 2023-02-02 VITALS
RESPIRATION RATE: 18 BRPM | HEIGHT: 65 IN | OXYGEN SATURATION: 100 % | DIASTOLIC BLOOD PRESSURE: 70 MMHG | HEART RATE: 75 BPM | SYSTOLIC BLOOD PRESSURE: 114 MMHG | BODY MASS INDEX: 29.38 KG/M2 | WEIGHT: 176.38 LBS | TEMPERATURE: 98 F

## 2023-02-02 DIAGNOSIS — C83.13 MANTLE CELL LYMPHOMA OF INTRA-ABDOMINAL LYMPH NODES: Primary | ICD-10-CM

## 2023-02-02 PROCEDURE — 99214 PR OFFICE/OUTPT VISIT, EST, LEVL IV, 30-39 MIN: ICD-10-PCS | Mod: ,,,

## 2023-02-02 PROCEDURE — 99214 OFFICE O/P EST MOD 30 MIN: CPT | Mod: ,,,

## 2023-02-02 NOTE — PROGRESS NOTES
DATE:  06/22/2022  PROBLEM:   Mixed hematological/lymph 0 reticular malignancy including;  1. )  CLL/SLL  2. )  Mantle cell lymphoma     HPI: 82 y/o M w/ PMHx of HTN, HLD, GERD, CAD, PORTER referred to [facility] for lymphocytosis and lymphadenopathy    Of note, review of his records reveals a flow cytometry sent in 2018 that was consistent with CLL. He also had a single visit with Dr Snow in 2018 but never followed back.   Patient had a PET-CT as well as a bone marrow biopsy and repeat peripheral flow cytometry with a diagnosis of CLL/SLL and mantle cell lymphoma.  Patient had symptoms of fatigue and weight loss prior to his workup and diagnosis.    Today, 02/02/23, patient denies any acute concerns today. He is doing well today and denies abnormal bleeding issues. Platelets >100,000. He denies any fevers, chills, drenching night sweats, new lumps or bumps, decreased appetite or weight loss.  He does report a skin tag/lesion over his right temporal region which has been growing since the last few months.       PMHx: HTN, HLD, GERD, CAD, PORTER  PSHx: PCI x6, lipoma, hernia  Social Hx: never smoker, no ETOH, no drugs. He reports prior exposures to asbestos and pesticides, does not know which ones  Family Hx: father: colon cancer, sister: breast cancer, colon cancer  Meds: reviewed  Allergies: NKDA    Labs:  02/01/23: Cr 0.92, ca 9.6, alb 3.6, LFTs WNL, wbc 3.08, hgb 12.6, plt 102, ANC 2.15, ALC 0.25  01/20/23; cr 0.97, alb 3.5, LFTs WNL, wbc 3.06, hgb 12.8, plt 64, ANC 2.25  11/22/22: cr 0.86, alb 3.7, LFTs WNL, , uric acid 5.1, wbc 2.16, hgb 12.8, plt 76, mcv 107.8, ALC 0.35, ANC 1.11  10/25/2022:  Creatinine 0.89, albumin 3.5, LFTs within normal limits, , uric acid 3.6.  WBC count 3.15, hemoglobin 12.7, .3, platelet count 125 1000, ANC 2.28.  10/11/2022:  Creatinine 1.04, albumin 3.6, LFTs within normal limits, magnesium 2.0, phosphorus 2.7, , uric acid 3.2, WBC 2.4, hemoglobin 13.4, MCV  106.5, platelet count 62585.  ANC 1.77.  2022:  Creatinine 0.80, albumin 3.7, LFTs within normal limits, , uric acid 4.0, WBC count 2.37, hemoglobin 13.2, , platelet count 11215, ANC 1.45.  2022:  Creatinine 0.81, albumin 3.8, calcium 9.2, total bili 0.5, LFTs within normal limits, uric acid 3.3, , WBC 3.45, hemoglobin 13.7, .8, platelet count 90340, ANC 2.06.  2022:  WBC 3.75, hemoglobin 13.1, .5, platelet count 73160, ANC 2.32, uric acid 4.1, , creatinine 1.09, albumin 3.5, calcium 9.6, LFTs within normal limits.  2022 WBC at 2.58 bed neutrophil adequate at 55%.  Hemoglobin 12.7, hematocrit 39.4.  2022:  Creatinine 0.99, calcium 9.1, , uric acid 3.7, phosphorus 3.3, potassium 4.5, WBC count 2.58, hemoglobin 12.7, platelet count 69767, ANC 1.44.  2022:  Potassium 4.9, sodium 140, creatinine 0.87, , uric acid 6.6.  2022:  Creatinine 1.03, albumin 3.3, ,  WBC count 10.32, hemoglobin 12.4, platelet count 135 1000.   2022:  Creatinine 0.93, albumin 3.6, total protein 6.4, , potassium 5.2, WBC count 9.6, hemoglobin 11.4, .4, platelet count 92.   4/ Cr 0.89 Alb 3.4 TP 6.4 Ca 9.4  TSH 2.0 Iron 58n TIBC 285 Ferritin 48 Folate 9.9 B12 426 Hep B s ag neg Uric acid 7.8 Hep C ab neg WBC 9.79 RBC 3.77 Hg 11.8 .5 RDW 13.4 PLT 91 ANC 2.79 ALC 6.36 Direct Ramos neg b2 micro 7.5 Copper 135 Hapto 225 Hep B core ab neg SPEP w/ ARABELLA normal Abs kappa 32 Abs lambda 124 SFLC ratio 0.26  3/18/22 Cr 1 AST 27 ALT 12 AlkPhos 87 Alb 4.1 TP 6.5 Ca 9.8 WBC 9.4 RBC 3.92 Hg 12.2 MCV 96 RDW 15.1  ALC 5.8 ANC 3  5/15/18 WBC 14.71 RBC 4.42 Hg 14.3 MCV 98.6 RDW 13  ANC 2.91 ALC 10.13 AMC 1.29    Imagin/7/22 PET CT: Stable right axillary lymph node with mild-to-moderate uptake, representing residual metabolically active tumor. No new suspicious uptake noted    2022 PET-CT:   Excellent response to therapy since her last exam with interval resolution or near complete resolution of multiple hypermetabolic lymph nodes above and below the diaphragm.  There is residual mild-to-moderate uptake in the right axillary lymph node, Deauville score 4  Interval resolution of diffuse uptake in spleen which is also decreased in size.  No new suspicious uptake.    05/10/22:-  right lower extremity Doppler negative for DVT      4/13/22 PET CT: multiple hypermetabolic LNs b/l neck. Right submandibular LN .7x1.7cm, SUV 7.8. Left submandibular LN 2.3x1.7cm SUV 7.2. Multiple hypermetabolic LNs in chest. Right axillary node 3.3x2cm SUV 8.1. Hypermetabolic left axillary LN SUV 6.4, 1.9x3.2cm. Hypermetabolic right paratracheal LN 2.4x1.6cm SUV 4.9. Right pulmonary hilum SUV 2.7. Subcarinal LN SUV 3.3, 1.2x3.3cm. Spleen enlarged at 15cm and demonstrates heterogeneous mild to moderate uptake SUV 5.2. Moderately intense activity in stomach and proximal duodenum with no degree of focality or discrete mass on CT. Multiple hypermetabolic LNs in abd and pelvis. Left periaortic LN SUV 6.8, 4x3.5cm. Large hypermetabolic mesenteric mass [id number] SUV 8.5. Aortocaval LN 3.5x3.8cm SUV 5.1. Hypermetabolic mass in left pelvis 5.5x7cm SUV 7. Left external iliac LN SUV 6 2.2x2.1cm. Extensive hypermetabolism anteriorly and laterally in left abdomen, associated with soft tissue thickening that is difficult to separate from bowel loops. B/l inguinal LNs up to 2.9x3.9cm, SUV 6.6    3/29/22 CT neck w/ and w/o contrast: widespread adenopathy identified in parotic glands bilateral, submandibular space, anterior and posterior cervical chains, superior mediastinum and supraclavicular and infraclavicular spaces of the base of neck/upper chest in appearance strongly suspicious for lymphoma. Multiple subcutaneous masses are also identified in lower occipital scalp and upper neck similar to lymphadenopathy. Biopsy of prominent subcutaneous  mass in right upper neck is recommended and should present the easiest location for tissue sampling    Path:  5/23/18 peripheral blood flow cytometry: CD5+ B cell population. No abnormal T cell or myeloid cell populations. CD19+, CD20+, CD5+, CD23+, CD11c+ surface kappa light chain+, CD38-, CD22+ (dim/mod), CD43+, FMC7-. Consistent with CLL    04/26/2022:  Bone marrow biopsy-marrow -trilineage hematopoiesis with 10-20% B-cell infiltrate, C5 positive B-cell population with immunophenotype typical for CLL.  CLL fish within normal limits, heavy chain mutation status cannot be determined.  6q, chromosome 12, chromosome 13, chromosome 11, chromosome 17 and t( 11;14) abnormalities not detected.    05/06/2022 left axillary lymph node core biopsy:  Mantle cell lymphoma with a subset consistent with CLL/SLL., CD5 positive monoclonal B-cell population is identified, approximately 91% of total cells, immuno phenotype is nonspecific and may be seen in CLL, marginal zone lymphoma, or large cell lymphoma.  A 2nd small monoclonal B-cell population approximately 2% of the cells identified with an immunophenotype consistent with CLL.  New genomic consultation lymphoma cells positive for CD 20, CD 5, cyclin D1 and BCL2 with a TI 67 of 10%.  There is no significant staining for CD10, BCL6.  However workup consistent with diagnosis of mantle cell lymphoma.        ROS  CONSTITUTIONAL: no fevers, no chills,  No weight loss, no  fatigue, no weakness  HEMATOLOGIC: no abnormal bleeding, no abnormal bruising, no drenching night sweats  ONCOLOGIC: no new masses or lumps  HEENT: no vision loss, no tinnitus or hearing loss, no nose bleeding, no dysphagia, no odynophagia  CVS: no chest pain, no palpitations, no dyspnea on exertion  RESP: no shortness of breath, no hemoptysis, no cough  GI: no nausea, no vomiting, no diarrhea, no constipation, no melena, no hematochezia, no hematemesis, no abdominal pain, no increase in abdominal girth  :  no dysuria, no hematuria, no hesitancy, no scrotal swelling, no discharge  INTEGUMENT: no rashes, no abnormal bruising, no nail pitting, no hyperpigmentation  NEURO: no falls, no memory loss, no paresthesias or dysesthesias, no urofecal incontinence or retention, no loss of strength on any extremity  MSK: no back pain, no new joint pain, no joint swelling  PSYCH: no suicidal or homicidal ideation, no depression, no insomnia, no anhedonia  ENDOCRINE: no heat or cold intolerance, no polyuria, no polydipsia      Physical Exam     Vitals:    02/02/23 1413   BP: 114/70   Pulse: 75   Resp: 18   Temp: 98.3 °F (36.8 °C)             GEN: AAOx3, NAD  HEENT: PERRLA, EOMI, edentulous, no oral ulcers  NECK:  Supple, full range of motion  LYMPH:  No axillary, supra clavicle or cervical adenopathy appreciated on today's exam.  CVS: s1s2 RRR, no M/R/G, port in place in right chest wall without surrounding edema or erythema.  RESP: CTA b/l, no crackles, no wheezes or rhonchi  ABD: soft, NT, ND, BS+, no hepatomegaly, mild splenomegaly  EXT: no deformities, right lower extremity edema.  SKIN:  No rash,.  No evidence of petechia.  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  Positive for CLL.  PET-CT with evidence of bulky lymphadenopathy, largest measuring 14 cm, spleen measuring 15 cm.  Discussed with patient and his family the diagnosis, natural history and treatment options.  Discussed the risk for TLS given bulky lymphadenopathy, perforation given lymphadenopathy around the bowel loops as well as declining ECOG given his advanced age.  Continuet PJP prophylaxis with Bactrim Monday Wednesday Friday and valacyclovir with treatment.  Continue allopurinol 300 mg q.day for TLS prophylaxis.  Patient is status  post cycle 1 of bendamustine and rituximab on 05/18/2022.  Patient has bendamustine was reduced by 25% and rituximab was given 1 week apart to reduce the risk of perforation.  S/p cycle 2 and cycle 3 without significant side effects, dosed for bendamustine increased to 100%.  Discussed with patient interim PET-CT with excellent response to treatment however with residual hypermetabolism in right axillary lymph node with a Deauville score of 4.  Given his excellent tolerance of treatment, will proceed to complete 6 cycles of BR in the setting of above PET-CT report.  Discussed alternate treatment plan to proceed with ibrutinib given partial response.  Patient elected to proceed with another 2 cycles of BR with plans to repeat PET-CT after completion of treatment to assess treatment response.  Cycle 5 with dose reduction by 20% given thrombocytopenia.  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     Based on available information, likely SLL/CLL with some constitutional symptoms including weight loss, fatigue and night sweats. Adenopathy easily palpable above and below the diaphragm. Mild thrombocytopenia w/ PLT count ~120k and very mild anemia with Hg ~12 g/dl. No hypercalcemia, no evidence of immune paresis  Biopsies 4/11/22 with Dr Brannon were FNAs and non diagnostic for CLL. I do not know what IHC was obtained or whether pathologist knew what the potential diagnosis was as report only says negative for carcinoma. Will discuss with Dr Lan to add CD5, CD10, CD19, CD20 etc if enough tissue  Regardless, will also refer for axillary LN biopsy, either excisional or at the very least core. Referral sent today  PET CT 4/2022 findings are noted. Given his advanced age he was not refer for EGD. Masses in abdomen are likely matted gurinder conglomerates and I do not think it would be worth it to attempt a biopsy of those  CLL FISH  Negative , IGHV mutation status  Hep B and C  negative  Allopurinol 300mg daily with PO hydration prior to initiation of any therapy  Given concomitant diagnosis of CLL and mantle cell lymphoma will plan to initiate treatment for mantle cell lymphoma and CLL with bendamustine rituximab.  Will consider BTK inhibitors if patient is intolerant to bendamustine/rituximab in the second-line    PET CT discussed with Dr. Costa and will proceed with ibrutinib as second line    Plan  Platelets 102,000 will resume imbrutinib at normal dose  C/w dermatology follow-up for skin tags/lesion, probable seborrheic keratoses.  RTC 4 weeks cbc, cmp, mag, phos, uric acid, ldh        Heather Babin, FNP-C

## 2023-03-02 ENCOUNTER — OFFICE VISIT (OUTPATIENT)
Dept: HEMATOLOGY/ONCOLOGY | Facility: CLINIC | Age: 83
End: 2023-03-02
Payer: MEDICARE

## 2023-03-02 VITALS
HEART RATE: 74 BPM | SYSTOLIC BLOOD PRESSURE: 132 MMHG | DIASTOLIC BLOOD PRESSURE: 80 MMHG | HEIGHT: 64 IN | TEMPERATURE: 98 F | OXYGEN SATURATION: 96 % | WEIGHT: 178.88 LBS | BODY MASS INDEX: 30.54 KG/M2 | RESPIRATION RATE: 20 BRPM

## 2023-03-02 DIAGNOSIS — C83.13 MANTLE CELL LYMPHOMA OF INTRA-ABDOMINAL LYMPH NODES: ICD-10-CM

## 2023-03-02 PROCEDURE — 99215 PR OFFICE/OUTPT VISIT, EST, LEVL V, 40-54 MIN: ICD-10-PCS | Mod: ,,,

## 2023-03-02 PROCEDURE — 99215 OFFICE O/P EST HI 40 MIN: CPT | Mod: ,,,

## 2023-03-02 NOTE — PROGRESS NOTES
DATE:  06/22/2022  PROBLEM:   Mixed hematological/lymph 0 reticular malignancy including;  1. )  CLL/SLL  2. )  Mantle cell lymphoma     HPI: 82 y/o M w/ PMHx of HTN, HLD, GERD, CAD, PORTER referred to [facility] for lymphocytosis and lymphadenopathy    Of note, review of his records reveals a flow cytometry sent in 2018 that was consistent with CLL. He also had a single visit with Dr Snow in 2018 but never followed back.   Patient had a PET-CT as well as a bone marrow biopsy and repeat peripheral flow cytometry with a diagnosis of CLL/SLL and mantle cell lymphoma.  Patient had symptoms of fatigue and weight loss prior to his workup and diagnosis.    Today, 03/02/23, patient denies any acute concerns today. He is doing well today and denies abnormal bleeding issues. Once again thrombocytopenia <100,000 noted on labs. Platelets 74,000 today. He denies any fevers, chills, drenching night sweats, new lumps or bumps, decreased appetite or weight loss.       PMHx: HTN, HLD, GERD, CAD, PORTER  PSHx: PCI x6, lipoma, hernia  Social Hx: never smoker, no ETOH, no drugs. He reports prior exposures to asbestos and pesticides, does not know which ones  Family Hx: father: colon cancer, sister: breast cancer, colon cancer  Meds: reviewed  Allergies: NKDA    Labs:  03/01/23: cr 0.96, ca 9.2, alb 3.4, AST 35, ALT 24, , mag 1.9, phosphorus 3.4, uric acid 3.8, wbc 3.23, hgb 12.5, plt 74,000, ANC 2.39  02/01/23: Cr 0.92, ca 9.6, alb 3.6, LFTs WNL, wbc 3.08, hgb 12.6, plt 102, ANC 2.15, ALC 0.25  01/20/23; cr 0.97, alb 3.5, LFTs WNL, wbc 3.06, hgb 12.8, plt 64, ANC 2.25  11/22/22: cr 0.86, alb 3.7, LFTs WNL, , uric acid 5.1, wbc 2.16, hgb 12.8, plt 76, mcv 107.8, ALC 0.35, ANC 1.11  10/25/2022:  Creatinine 0.89, albumin 3.5, LFTs within normal limits, , uric acid 3.6.  WBC count 3.15, hemoglobin 12.7, .3, platelet count 125 1000, ANC 2.28.  10/11/2022:  Creatinine 1.04, albumin 3.6, LFTs within normal limits,  magnesium 2.0, phosphorus 2.7, , uric acid 3.2, WBC 2.4, hemoglobin 13.4, .5, platelet count 36610.  ANC 1.77.  2022:  Creatinine 0.80, albumin 3.7, LFTs within normal limits, , uric acid 4.0, WBC count 2.37, hemoglobin 13.2, , platelet count 42480, ANC 1.45.  2022:  Creatinine 0.81, albumin 3.8, calcium 9.2, total bili 0.5, LFTs within normal limits, uric acid 3.3, , WBC 3.45, hemoglobin 13.7, .8, platelet count 32476, ANC 2.06.  2022:  WBC 3.75, hemoglobin 13.1, .5, platelet count 75645, ANC 2.32, uric acid 4.1, , creatinine 1.09, albumin 3.5, calcium 9.6, LFTs within normal limits.  2022 WBC at 2.58 bed neutrophil adequate at 55%.  Hemoglobin 12.7, hematocrit 39.4.  2022:  Creatinine 0.99, calcium 9.1, , uric acid 3.7, phosphorus 3.3, potassium 4.5, WBC count 2.58, hemoglobin 12.7, platelet count 73479, ANC 1.44.  2022:  Potassium 4.9, sodium 140, creatinine 0.87, , uric acid 6.6.  2022:  Creatinine 1.03, albumin 3.3, ,  WBC count 10.32, hemoglobin 12.4, platelet count 135 1000.   2022:  Creatinine 0.93, albumin 3.6, total protein 6.4, , potassium 5.2, WBC count 9.6, hemoglobin 11.4, .4, platelet count 92.   4/ Cr 0.89 Alb 3.4 TP 6.4 Ca 9.4  TSH 2.0 Iron 58n TIBC 285 Ferritin 48 Folate 9.9 B12 426 Hep B s ag neg Uric acid 7.8 Hep C ab neg WBC 9.79 RBC 3.77 Hg 11.8 .5 RDW 13.4 PLT 91 ANC 2.79 ALC 6.36 Direct Ramos neg b2 micro 7.5 Copper 135 Hapto 225 Hep B core ab neg SPEP w/ ARABELLA normal Abs kappa 32 Abs lambda 124 SFLC ratio 0.26  3/18/22 Cr 1 AST 27 ALT 12 AlkPhos 87 Alb 4.1 TP 6.5 Ca 9.8 WBC 9.4 RBC 3.92 Hg 12.2 MCV 96 RDW 15.1  ALC 5.8 ANC 3  5/15/18 WBC 14.71 RBC 4.42 Hg 14.3 MCV 98.6 RDW 13  ANC 2.91 ALC 10.13 AMC 1.29    Imagin22 PET CT: Stable right axillary lymph node with mild-to-moderate uptake, representing residual  metabolically active tumor. No new suspicious uptake noted    08/25/2022 PET-CT:  Excellent response to therapy since her last exam with interval resolution or near complete resolution of multiple hypermetabolic lymph nodes above and below the diaphragm.  There is residual mild-to-moderate uptake in the right axillary lymph node, Deauville score 4  Interval resolution of diffuse uptake in spleen which is also decreased in size.  No new suspicious uptake.    05/10/22:-  right lower extremity Doppler negative for DVT      4/13/22 PET CT: multiple hypermetabolic LNs b/l neck. Right submandibular LN .7x1.7cm, SUV 7.8. Left submandibular LN 2.3x1.7cm SUV 7.2. Multiple hypermetabolic LNs in chest. Right axillary node 3.3x2cm SUV 8.1. Hypermetabolic left axillary LN SUV 6.4, 1.9x3.2cm. Hypermetabolic right paratracheal LN 2.4x1.6cm SUV 4.9. Right pulmonary hilum SUV 2.7. Subcarinal LN SUV 3.3, 1.2x3.3cm. Spleen enlarged at 15cm and demonstrates heterogeneous mild to moderate uptake SUV 5.2. Moderately intense activity in stomach and proximal duodenum with no degree of focality or discrete mass on CT. Multiple hypermetabolic LNs in abd and pelvis. Left periaortic LN SUV 6.8, 4x3.5cm. Large hypermetabolic mesenteric mass [id number] SUV 8.5. Aortocaval LN 3.5x3.8cm SUV 5.1. Hypermetabolic mass in left pelvis 5.5x7cm SUV 7. Left external iliac LN SUV 6 2.2x2.1cm. Extensive hypermetabolism anteriorly and laterally in left abdomen, associated with soft tissue thickening that is difficult to separate from bowel loops. B/l inguinal LNs up to 2.9x3.9cm, SUV 6.6    3/29/22 CT neck w/ and w/o contrast: widespread adenopathy identified in parotic glands bilateral, submandibular space, anterior and posterior cervical chains, superior mediastinum and supraclavicular and infraclavicular spaces of the base of neck/upper chest in appearance strongly suspicious for lymphoma. Multiple subcutaneous masses are also identified in lower occipital  scalp and upper neck similar to lymphadenopathy. Biopsy of prominent subcutaneous mass in right upper neck is recommended and should present the easiest location for tissue sampling    Path:  5/23/18 peripheral blood flow cytometry: CD5+ B cell population. No abnormal T cell or myeloid cell populations. CD19+, CD20+, CD5+, CD23+, CD11c+ surface kappa light chain+, CD38-, CD22+ (dim/mod), CD43+, FMC7-. Consistent with CLL    04/26/2022:  Bone marrow biopsy-marrow -trilineage hematopoiesis with 10-20% B-cell infiltrate, C5 positive B-cell population with immunophenotype typical for CLL.  CLL fish within normal limits, heavy chain mutation status cannot be determined.  6q, chromosome 12, chromosome 13, chromosome 11, chromosome 17 and t( 11;14) abnormalities not detected.    05/06/2022 left axillary lymph node core biopsy:  Mantle cell lymphoma with a subset consistent with CLL/SLL., CD5 positive monoclonal B-cell population is identified, approximately 91% of total cells, immuno phenotype is nonspecific and may be seen in CLL, marginal zone lymphoma, or large cell lymphoma.  A 2nd small monoclonal B-cell population approximately 2% of the cells identified with an immunophenotype consistent with CLL.  New genomic consultation lymphoma cells positive for CD 20, CD 5, cyclin D1 and BCL2 with a TI 67 of 10%.  There is no significant staining for CD10, BCL6.  However workup consistent with diagnosis of mantle cell lymphoma.        ROS  CONSTITUTIONAL: no fevers, no chills,  No weight loss, no  fatigue, no weakness  HEMATOLOGIC: no abnormal bleeding, no abnormal bruising, no drenching night sweats  ONCOLOGIC: no new masses or lumps  HEENT: no vision loss, no tinnitus or hearing loss, no nose bleeding, no dysphagia, no odynophagia  CVS: no chest pain, no palpitations, no dyspnea on exertion  RESP: no shortness of breath, no hemoptysis, no cough  GI: no nausea, no vomiting, no diarrhea, no constipation, no melena, no  hematochezia, no hematemesis, no abdominal pain, no increase in abdominal girth  : no dysuria, no hematuria, no hesitancy, no scrotal swelling, no discharge  INTEGUMENT: no rashes, no abnormal bruising, no nail pitting, no hyperpigmentation  NEURO: no falls, no memory loss, no paresthesias or dysesthesias, no urofecal incontinence or retention, no loss of strength on any extremity  MSK: no back pain, no new joint pain, no joint swelling  PSYCH: no suicidal or homicidal ideation, no depression, no insomnia, no anhedonia  ENDOCRINE: no heat or cold intolerance, no polyuria, no polydipsia      Physical Exam     Vitals:    03/02/23 1411   BP: 132/80   Pulse: 74   Resp: 20   Temp: 97.7 °F (36.5 °C)               GEN: AAOx3, NAD  HEENT: PERRLA, EOMI, edentulous, no oral ulcers  NECK:  Supple, full range of motion  LYMPH:  No axillary, supra clavicle or cervical adenopathy appreciated on today's exam.  CVS: s1s2 RRR, no M/R/G, port in place in right chest wall without surrounding edema or erythema.  RESP: CTA b/l, no crackles, no wheezes or rhonchi  ABD: soft, NT, ND, BS+, no hepatomegaly, mild splenomegaly  EXT: no deformities, right lower extremity edema.  SKIN:  No rash,.  No evidence of petechia.  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  Positive for CLL.  PET-CT with evidence of bulky lymphadenopathy, largest measuring 14 cm, spleen measuring 15 cm.  Discussed with patient and his family the diagnosis, natural history and treatment options.  Discussed the risk for TLS given bulky lymphadenopathy, perforation given lymphadenopathy around the bowel loops as well as declining ECOG given his advanced age.  Continuet PJP prophylaxis with Bactrim Monday Wednesday Friday and valacyclovir with  treatment.  Continue allopurinol 300 mg q.day for TLS prophylaxis.  Patient is status post cycle 1 of bendamustine and rituximab on 05/18/2022.  Patient has bendamustine was reduced by 25% and rituximab was given 1 week apart to reduce the risk of perforation.  S/p cycle 2 and cycle 3 without significant side effects, dosed for bendamustine increased to 100%.  Discussed with patient interim PET-CT with excellent response to treatment however with residual hypermetabolism in right axillary lymph node with a Deauville score of 4.  Given his excellent tolerance of treatment, will proceed to complete 6 cycles of BR in the setting of above PET-CT report.  Discussed alternate treatment plan to proceed with ibrutinib given partial response.  Patient elected to proceed with another 2 cycles of BR with plans to repeat PET-CT after completion of treatment to assess treatment response.  Cycle 5 with dose reduction by 20% given thrombocytopenia.  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     Based on available information, likely SLL/CLL with some constitutional symptoms including weight loss, fatigue and night sweats. Adenopathy easily palpable above and below the diaphragm. Mild thrombocytopenia w/ PLT count ~120k and very mild anemia with Hg ~12 g/dl. No hypercalcemia, no evidence of immune paresis  Biopsies 4/11/22 with Dr Brannon were FNAs and non diagnostic for CLL. I do not know what IHC was obtained or whether pathologist knew what the potential diagnosis was as report only says negative for carcinoma. Will discuss with Dr Lan to add CD5, CD10, CD19, CD20 etc if enough tissue  Regardless, will also refer for axillary LN biopsy, either excisional or at the very least core. Referral sent today  PET CT 4/2022 findings are noted. Given his advanced age he was not refer for EGD. Masses in abdomen are likely matted gurinder conglomerates and I do not think it would be worth it to  attempt a biopsy of those  CLL FISH  Negative , IGHV mutation status  Hep B and C negative  Allopurinol 300mg daily with PO hydration prior to initiation of any therapy  Given concomitant diagnosis of CLL and mantle cell lymphoma will plan to initiate treatment for mantle cell lymphoma and CLL with bendamustine rituximab.  Will consider BTK inhibitors if patient is intolerant to bendamustine/rituximab in the second-line    PET CT discussed with Dr. Costa and will proceed with ibrutinib as second line  Platelets 74,000 today will hold ibrutinib 1 week and if plts return >100,000 will resume ibrutinib and reduce dose from 560 mg to 420 mg.  Will plan to repeat PET CT once new 420 mg dose has been taken for 4 weeks.    Plan  Platelets 74,000 today, will hold imbrutinib recheck cbc 1 week  If plts >100,000 will resume ibrutinib   C/w dermatology follow-up for skin tags/lesion, probable seborrheic keratoses.  Will resume after repeat labs next week, RTC 4 weeks cbc, cmp, mag, phos, uric acid, ldh        Heather Babin, [id number]

## 2023-03-10 ENCOUNTER — OFFICE VISIT (OUTPATIENT)
Dept: HEMATOLOGY/ONCOLOGY | Facility: CLINIC | Age: 83
End: 2023-03-10
Payer: MEDICARE

## 2023-03-10 VITALS — WEIGHT: 178 LBS | HEIGHT: 64 IN | BODY MASS INDEX: 30.39 KG/M2

## 2023-03-10 DIAGNOSIS — C83.13 MANTLE CELL LYMPHOMA OF INTRA-ABDOMINAL LYMPH NODES: Primary | ICD-10-CM

## 2023-03-10 PROCEDURE — 99442 PR PHYSICIAN TELEPHONE EVALUATION 11-20 MIN: ICD-10-PCS | Mod: 95,,,

## 2023-03-10 PROCEDURE — 99442 PR PHYSICIAN TELEPHONE EVALUATION 11-20 MIN: CPT | Mod: 95,,,

## 2023-03-10 NOTE — PROGRESS NOTES
Established Patient - Audio Only Telehealth Visit     The patient location is: Home  The chief complaint leading to consultation is: Mantle cell lymphoma  Visit type: Virtual visit with audio only (telephone)  Total time spent with patient: 12 minutes       The reason for the audio only service rather than synchronous audio and video virtual visit was related to technical difficulties or patient preference/necessity.     Each patient to whom I provide medical services by telemedicine is:  (1) informed of the relationship between the physician and patient and the respective role of any other health care provider with respect to management of the patient; and (2) notified that they may decline to receive medical services by telemedicine and may withdraw from such care at any time. Patient verbally consented to receive this service via voice-only telephone call.       HPI:   PROBLEM:   Mixed hematological/lymph 0 reticular malignancy including;  1. )  CLL/SLL  2. )  Mantle cell lymphoma     HPI: 80 y/o M w/ PMHx of HTN, HLD, GERD, CAD, PORTER referred to [facility] for lymphocytosis and lymphadenopathy    Of note, review of his records reveals a flow cytometry sent in 2018 that was consistent with CLL. He also had a single visit with Dr Snow in 2018 but never followed back.   Patient had a PET-CT as well as a bone marrow biopsy and repeat peripheral flow cytometry with a diagnosis of CLL/SLL and mantle cell lymphoma.  Patient had symptoms of fatigue and weight loss prior to his workup and diagnosis.     Today, 03/09/23, patient denies any acute concerns today. He is doing well today and denies abnormal bleeding issues. Once again thrombocytopenia <100,000 noted on labs. Platelets 87,000 today. He denies any fevers, chills, drenching night sweats, new lumps or bumps, decreased appetite or weight loss.        PMHx: HTN, HLD, GERD, CAD, PORTER  PSHx: PCI x6, lipoma, hernia  Social Hx: never smoker, no ETOH, no drugs. He  reports prior exposures to asbestos and pesticides, does not know which ones  Family Hx: father: colon cancer, sister: breast cancer, colon cancer  Meds: reviewed  Allergies: NKDA    Labs:  03/10/23: CMP unremarkable, wbc 2.57, hgb 12.8, plt 87, ANC 1.85  03/01/23: cr 0.96, ca 9.2, alb 3.4, AST 35, ALT 24, , mag 1.9, phosphorus 3.4, uric acid 3.8, wbc 3.23, hgb 12.5, plt 74,000, ANC 2.39  02/01/23: Cr 0.92, ca 9.6, alb 3.6, LFTs WNL, wbc 3.08, hgb 12.6, plt 102, ANC 2.15, ALC 0.25  01/20/23; cr 0.97, alb 3.5, LFTs WNL, wbc 3.06, hgb 12.8, plt 64, ANC 2.25  11/22/22: cr 0.86, alb 3.7, LFTs WNL, , uric acid 5.1, wbc 2.16, hgb 12.8, plt 76, mcv 107.8, ALC 0.35, ANC 1.11  10/25/2022:  Creatinine 0.89, albumin 3.5, LFTs within normal limits, , uric acid 3.6.  WBC count 3.15, hemoglobin 12.7, .3, platelet count 125 1000, ANC 2.28.  10/11/2022:  Creatinine 1.04, albumin 3.6, LFTs within normal limits, magnesium 2.0, phosphorus 2.7, , uric acid 3.2, WBC 2.4, hemoglobin 13.4, .5, platelet count 78740.  ANC 1.77.  09/13/2022:  Creatinine 0.80, albumin 3.7, LFTs within normal limits, , uric acid 4.0, WBC count 2.37, hemoglobin 13.2, , platelet count 21774, ANC 1.45.  08/16/2022:  Creatinine 0.81, albumin 3.8, calcium 9.2, total bili 0.5, LFTs within normal limits, uric acid 3.3, , WBC 3.45, hemoglobin 13.7, .8, platelet count 16659, ANC 2.06.  07/19/2022:  WBC 3.75, hemoglobin 13.1, .5, platelet count 52782, ANC 2.32, uric acid 4.1, , creatinine 1.09, albumin 3.5, calcium 9.6, LFTs within normal limits.  06/21/2022 WBC at 2.58 bed neutrophil adequate at 55%.  Hemoglobin 12.7, hematocrit 39.4.  06/14/2022:  Creatinine 0.99, calcium 9.1, , uric acid 3.7, phosphorus 3.3, potassium 4.5, WBC count 2.58, hemoglobin 12.7, platelet count 95015, ANC 1.44.  05/27/2022:  Potassium 4.9, sodium 140, creatinine 0.87, , uric acid  6.6.  2022:  Creatinine 1.03, albumin 3.3, ,  WBC count 10.32, hemoglobin 12.4, platelet count 135 1000.   2022:  Creatinine 0.93, albumin 3.6, total protein 6.4, , potassium 5.2, WBC count 9.6, hemoglobin 11.4, .4, platelet count 92.   4/ Cr 0.89 Alb 3.4 TP 6.4 Ca 9.4  TSH 2.0 Iron 58n TIBC 285 Ferritin 48 Folate 9.9 B12 426 Hep B s ag neg Uric acid 7.8 Hep C ab neg WBC 9.79 RBC 3.77 Hg 11.8 .5 RDW 13.4 PLT 91 ANC 2.79 ALC 6.36 Direct Ramos neg b2 micro 7.5 Copper 135 Hapto 225 Hep B core ab neg SPEP w/ ARABELLA normal Abs kappa 32 Abs lambda 124 SFLC ratio 0.26  3/18/22 Cr 1 AST 27 ALT 12 AlkPhos 87 Alb 4.1 TP 6.5 Ca 9.8 WBC 9.4 RBC 3.92 Hg 12.2 MCV 96 RDW 15.1  ALC 5.8 ANC 3  5/15/18 WBC 14.71 RBC 4.42 Hg 14.3 MCV 98.6 RDW 13  ANC 2.91 ALC 10.13 AMC 1.29    Imagin22 PET CT: Stable right axillary lymph node with mild-to-moderate uptake, representing residual metabolically active tumor. No new suspicious uptake noted     2022 PET-CT:  Excellent response to therapy since her last exam with interval resolution or near complete resolution of multiple hypermetabolic lymph nodes above and below the diaphragm.  There is residual mild-to-moderate uptake in the right axillary lymph node, Deauville score 4  Interval resolution of diffuse uptake in spleen which is also decreased in size.  No new suspicious uptake.     05/10/22:-  right lower extremity Doppler negative for DVT       22 PET CT: multiple hypermetabolic LNs b/l neck. Right submandibular LN .7x1.7cm, SUV 7.8. Left submandibular LN 2.3x1.7cm SUV 7.2. Multiple hypermetabolic LNs in chest. Right axillary node 3.3x2cm SUV 8.1. Hypermetabolic left axillary LN SUV 6.4, 1.9x3.2cm. Hypermetabolic right paratracheal LN 2.4x1.6cm SUV 4.9. Right pulmonary hilum SUV 2.7. Subcarinal LN SUV 3.3, 1.2x3.3cm. Spleen enlarged at 15cm and demonstrates heterogeneous mild to moderate uptake SUV 5.2.  Moderately intense activity in stomach and proximal duodenum with no degree of focality or discrete mass on CT. Multiple hypermetabolic LNs in abd and pelvis. Left periaortic LN SUV 6.8, 4x3.5cm. Large hypermetabolic mesenteric mass [id number] SUV 8.5. Aortocaval LN 3.5x3.8cm SUV 5.1. Hypermetabolic mass in left pelvis 5.5x7cm SUV 7. Left external iliac LN SUV 6 2.2x2.1cm. Extensive hypermetabolism anteriorly and laterally in left abdomen, associated with soft tissue thickening that is difficult to separate from bowel loops. B/l inguinal LNs up to 2.9x3.9cm, SUV 6.6    3/29/22 CT neck w/ and w/o contrast: widespread adenopathy identified in parotic glands bilateral, submandibular space, anterior and posterior cervical chains, superior mediastinum and supraclavicular and infraclavicular spaces of the base of neck/upper chest in appearance strongly suspicious for lymphoma. Multiple subcutaneous masses are also identified in lower occipital scalp and upper neck similar to lymphadenopathy. Biopsy of prominent subcutaneous mass in right upper neck is recommended and should present the easiest location for tissue sampling    Path:  5/23/18 peripheral blood flow cytometry: CD5+ B cell population. No abnormal T cell or myeloid cell populations. CD19+, CD20+, CD5+, CD23+, CD11c+ surface kappa light chain+, CD38-, CD22+ (dim/mod), CD43+, FMC7-. Consistent with CLL     04/26/2022:  Bone marrow biopsy-marrow -trilineage hematopoiesis with 10-20% B-cell infiltrate, C5 positive B-cell population with immunophenotype typical for CLL.  CLL fish within normal limits, heavy chain mutation status cannot be determined.  6q, chromosome 12, chromosome 13, chromosome 11, chromosome 17 and t( 11;14) abnormalities not detected.     05/06/2022 left axillary lymph node core biopsy:  Mantle cell lymphoma with a subset consistent with CLL/SLL., CD5 positive monoclonal B-cell population is identified, approximately 91% of total cells, immuno  phenotype is nonspecific and may be seen in CLL, marginal zone lymphoma, or large cell lymphoma.  A 2nd small monoclonal B-cell population approximately 2% of the cells identified with an immunophenotype consistent with CLL.  New genomic consultation lymphoma cells positive for CD 20, CD 5, cyclin D1 and BCL2 with a TI 67 of 10%.  There is no significant staining for CD10, BCL6.  However workup consistent with diagnosis of mantle cell lymphoma.           ROS  CONSTITUTIONAL: no fevers, no chills,  No weight loss, no  fatigue, no weakness  HEMATOLOGIC: no abnormal bleeding, no abnormal bruising, no drenching night sweats  ONCOLOGIC: no new masses or lumps  HEENT: no vision loss, no tinnitus or hearing loss, no nose bleeding, no dysphagia, no odynophagia  CVS: no chest pain, no palpitations, no dyspnea on exertion  RESP: no shortness of breath, no hemoptysis, no cough  GI: no nausea, no vomiting, no diarrhea, no constipation, no melena, no hematochezia, no hematemesis, no abdominal pain, no increase in abdominal girth  : no dysuria, no hematuria, no hesitancy, no scrotal swelling, no discharge  INTEGUMENT: no rashes, no abnormal bruising, no nail pitting, no hyperpigmentation  NEURO: no falls, no memory loss, no paresthesias or dysesthesias, no urofecal incontinence or retention, no loss of strength on any extremity  MSK: no back pain, no new joint pain, no joint swelling  PSYCH: no suicidal or homicidal ideation, no depression, no insomnia, no anhedonia  ENDOCRINE: no heat or cold intolerance, no polyuria, no polydipsia           ASSESSMENT:     # Mantle cell lymphoma, [place]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  Positive for CLL.  PET-CT with evidence of bulky lymphadenopathy, largest measuring 14 cm, spleen measuring 15 cm.  Discussed with patient and his family the diagnosis, natural history and treatment  options.  Discussed the risk for TLS given bulky lymphadenopathy, perforation given lymphadenopathy around the bowel loops as well as declining ECOG given his advanced age.  Continuet PJP prophylaxis with Bactrim Monday Wednesday Friday and valacyclovir with treatment.  Continue allopurinol 300 mg q.day for TLS prophylaxis.  Patient is status post cycle 1 of bendamustine and rituximab on 05/18/2022.  Patient has bendamustine was reduced by 25% and rituximab was given 1 week apart to reduce the risk of perforation.  S/p cycle 2 and cycle 3 without significant side effects, dosed for bendamustine increased to 100%.  Discussed with patient interim PET-CT with excellent response to treatment however with residual hypermetabolism in right axillary lymph node with a Deauville score of 4.  Given his excellent tolerance of treatment, will proceed to complete 6 cycles of BR in the setting of above PET-CT report.  Discussed alternate treatment plan to proceed with ibrutinib given partial response.  Patient elected to proceed with another 2 cycles of BR with plans to repeat PET-CT after completion of treatment to assess treatment response.  Cycle 5 with dose reduction by 20% given thrombocytopenia.  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      Based on available information, likely SLL/CLL with some constitutional symptoms including weight loss, fatigue and night sweats. Adenopathy easily palpable above and below the diaphragm. Mild thrombocytopenia w/ PLT count ~120k and very mild anemia with Hg ~12 g/dl. No hypercalcemia, no evidence of immune paresis  Biopsies 4/11/22 with Dr Brannon were FNAs and non diagnostic for CLL. I do not know what IHC was obtained or whether pathologist knew what the potential diagnosis was as report only says negative for carcinoma. Will discuss with Dr Lan to add CD5, CD10, CD19, CD20 etc if enough tissue  Regardless, will also refer for axillary LN  biopsy, either excisional or at the very least core. Referral sent today  PET CT 4/2022 findings are noted. Given his advanced age he was not refer for EGD. Masses in abdomen are likely matted gurinder conglomerates and I do not think it would be worth it to attempt a biopsy of those  CLL FISH  Negative , IGHV mutation status  Hep B and C negative  Allopurinol 300mg daily with PO hydration prior to initiation of any therapy  Given concomitant diagnosis of CLL and mantle cell lymphoma will plan to initiate treatment for mantle cell lymphoma and CLL with bendamustine rituximab.  Will consider BTK inhibitors if patient is intolerant to bendamustine/rituximab in the second-line     PET CT discussed with Dr. Costa and will proceed with ibrutinib as second line  Platelets 87,000 today will hold ibrutinib 1 week and if plts return >100,000 will resume ibrutinib and reduce dose from 560 mg to 420 mg.  Will plan to repeat PET CT once new 420 mg dose has been taken for 4 weeks.     Plan  Platelets 87,000 today, continue to hold imbrutinib recheck cbc 1 week  If plts >100,000 will resume ibrutinib   C/w dermatology follow-up for skin tags/lesion, probable seborrheic keratoses.  Will resume after repeat labs next week, RTC 4 weeks cbc, cmp, mag, phos, uric acid, ldh                                    This service was not originating from a related E/M service provided within the previous 7 days nor will  to an E/M service or procedure within the next 24 hours or my soonest available appointment.  Prevailing standard of care was able to be met in this audio-only visit.

## 2023-03-16 ENCOUNTER — OFFICE VISIT (OUTPATIENT)
Dept: HEMATOLOGY/ONCOLOGY | Facility: CLINIC | Age: 83
End: 2023-03-16
Payer: MEDICARE

## 2023-03-16 VITALS — WEIGHT: 178 LBS | BODY MASS INDEX: 30.39 KG/M2 | HEIGHT: 64 IN

## 2023-03-16 DIAGNOSIS — C83.13 MANTLE CELL LYMPHOMA OF INTRA-ABDOMINAL LYMPH NODES: Primary | ICD-10-CM

## 2023-03-16 PROCEDURE — 99442 PR PHYSICIAN TELEPHONE EVALUATION 11-20 MIN: CPT | Mod: 95,,,

## 2023-03-16 PROCEDURE — 99442 PR PHYSICIAN TELEPHONE EVALUATION 11-20 MIN: ICD-10-PCS | Mod: 95,,,

## 2023-03-16 NOTE — PROGRESS NOTES
Established Patient - Audio Only Telehealth Visit     The patient location is: Home  The chief complaint leading to consultation is: Mantle cell lymphoma  Visit type: Virtual visit with audio only (telephone)  Total time spent with patient: 12 minutes       The reason for the audio only service rather than synchronous audio and video virtual visit was related to technical difficulties or patient preference/necessity.     Each patient to whom I provide medical services by telemedicine is:  (1) informed of the relationship between the physician and patient and the respective role of any other health care provider with respect to management of the patient; and (2) notified that they may decline to receive medical services by telemedicine and may withdraw from such care at any time. Patient verbally consented to receive this service via voice-only telephone call.       HPI:      HPI:   PROBLEM:   Mixed hematological/lymph 0 reticular malignancy including;  1. )  CLL/SLL  2. )  Mantle cell lymphoma     HPI: 82 y/o M w/ PMHx of HTN, HLD, GERD, CAD, PORTER referred to [facility] for lymphocytosis and lymphadenopathy    Of note, review of his records reveals a flow cytometry sent in 2018 that was consistent with CLL. He also had a single visit with Dr Snow in 2018 but never followed back.   Patient had a PET-CT as well as a bone marrow biopsy and repeat peripheral flow cytometry with a diagnosis of CLL/SLL and mantle cell lymphoma.  Patient had symptoms of fatigue and weight loss prior to his workup and diagnosis.     Today, 03/16/23, patient via telephone today to review cbc. He is doing well today and denies abnormal bleeding issues. Improved platelets 112,000 today. He denies any fevers, chills, drenching night sweats, new lumps or bumps, decreased appetite or weight loss.        PMHx: HTN, HLD, GERD, CAD, PORTER  PSHx: PCI x6, lipoma, hernia  Social Hx: never smoker, no ETOH, no drugs. He reports prior exposures to asbestos  and pesticides, does not know which ones  Family Hx: father: colon cancer, sister: breast cancer, colon cancer  Meds: reviewed  Allergies: NKDA    Labs:  03/10/23: CMP unremarkable, wbc 2.57, hgb 12.8, plt 87, ANC 1.85  03/01/23: cr 0.96, ca 9.2, alb 3.4, AST 35, ALT 24, , mag 1.9, phosphorus 3.4, uric acid 3.8, wbc 3.23, hgb 12.5, plt 74,000, ANC 2.39  02/01/23: Cr 0.92, ca 9.6, alb 3.6, LFTs WNL, wbc 3.08, hgb 12.6, plt 102, ANC 2.15, ALC 0.25  01/20/23; cr 0.97, alb 3.5, LFTs WNL, wbc 3.06, hgb 12.8, plt 64, ANC 2.25  11/22/22: cr 0.86, alb 3.7, LFTs WNL, , uric acid 5.1, wbc 2.16, hgb 12.8, plt 76, mcv 107.8, ALC 0.35, ANC 1.11  10/25/2022:  Creatinine 0.89, albumin 3.5, LFTs within normal limits, , uric acid 3.6.  WBC count 3.15, hemoglobin 12.7, .3, platelet count 125 1000, ANC 2.28.  10/11/2022:  Creatinine 1.04, albumin 3.6, LFTs within normal limits, magnesium 2.0, phosphorus 2.7, , uric acid 3.2, WBC 2.4, hemoglobin 13.4, .5, platelet count 16499.  ANC 1.77.  09/13/2022:  Creatinine 0.80, albumin 3.7, LFTs within normal limits, , uric acid 4.0, WBC count 2.37, hemoglobin 13.2, , platelet count 00534, ANC 1.45.  08/16/2022:  Creatinine 0.81, albumin 3.8, calcium 9.2, total bili 0.5, LFTs within normal limits, uric acid 3.3, , WBC 3.45, hemoglobin 13.7, .8, platelet count 38076, ANC 2.06.  07/19/2022:  WBC 3.75, hemoglobin 13.1, .5, platelet count 55983, ANC 2.32, uric acid 4.1, , creatinine 1.09, albumin 3.5, calcium 9.6, LFTs within normal limits.  06/21/2022 WBC at 2.58 bed neutrophil adequate at 55%.  Hemoglobin 12.7, hematocrit 39.4.  06/14/2022:  Creatinine 0.99, calcium 9.1, , uric acid 3.7, phosphorus 3.3, potassium 4.5, WBC count 2.58, hemoglobin 12.7, platelet count 31024, ANC 1.44.  05/27/2022:  Potassium 4.9, sodium 140, creatinine 0.87, , uric acid 6.6.  05/17/2022:  Creatinine 1.03, albumin 3.3,  ,  WBC count 10.32, hemoglobin 12.4, platelet count 135 1000.   2022:  Creatinine 0.93, albumin 3.6, total protein 6.4, , potassium 5.2, WBC count 9.6, hemoglobin 11.4, .4, platelet count 92.   4 Cr 0.89 Alb 3.4 TP 6.4 Ca 9.4  TSH 2.0 Iron 58n TIBC 285 Ferritin 48 Folate 9.9 B12 426 Hep B s ag neg Uric acid 7.8 Hep C ab neg WBC 9.79 RBC 3.77 Hg 11.8 .5 RDW 13.4 PLT 91 ANC 2.79 ALC 6.36 Direct Ramos neg b2 micro 7.5 Copper 135 Hapto 225 Hep B core ab neg SPEP w/ ARABELLA normal Abs kappa 32 Abs lambda 124 SFLC ratio 0.26  3/18/22 Cr 1 AST 27 ALT 12 AlkPhos 87 Alb 4.1 TP 6.5 Ca 9.8 WBC 9.4 RBC 3.92 Hg 12.2 MCV 96 RDW 15.1  ALC 5.8 ANC 3  5/15/18 WBC 14.71 RBC 4.42 Hg 14.3 MCV 98.6 RDW 13  ANC 2.91 ALC 10.13 AMC 1.29    Imagin22 PET CT: Stable right axillary lymph node with mild-to-moderate uptake, representing residual metabolically active tumor. No new suspicious uptake noted     2022 PET-CT:  Excellent response to therapy since her last exam with interval resolution or near complete resolution of multiple hypermetabolic lymph nodes above and below the diaphragm.  There is residual mild-to-moderate uptake in the right axillary lymph node, Deauville score 4  Interval resolution of diffuse uptake in spleen which is also decreased in size.  No new suspicious uptake.     05/10/22:-  right lower extremity Doppler negative for DVT       22 PET CT: multiple hypermetabolic LNs b/l neck. Right submandibular LN .7x1.7cm, SUV 7.8. Left submandibular LN 2.3x1.7cm SUV 7.2. Multiple hypermetabolic LNs in chest. Right axillary node 3.3x2cm SUV 8.1. Hypermetabolic left axillary LN SUV 6.4, 1.9x3.2cm. Hypermetabolic right paratracheal LN 2.4x1.6cm SUV 4.9. Right pulmonary hilum SUV 2.7. Subcarinal LN SUV 3.3, 1.2x3.3cm. Spleen enlarged at 15cm and demonstrates heterogeneous mild to moderate uptake SUV 5.2. Moderately intense activity in stomach and proximal  duodenum with no degree of focality or discrete mass on CT. Multiple hypermetabolic LNs in abd and pelvis. Left periaortic LN SUV 6.8, 4x3.5cm. Large hypermetabolic mesenteric mass [id number] SUV 8.5. Aortocaval LN 3.5x3.8cm SUV 5.1. Hypermetabolic mass in left pelvis 5.5x7cm SUV 7. Left external iliac LN SUV 6 2.2x2.1cm. Extensive hypermetabolism anteriorly and laterally in left abdomen, associated with soft tissue thickening that is difficult to separate from bowel loops. B/l inguinal LNs up to 2.9x3.9cm, SUV 6.6    3/29/22 CT neck w/ and w/o contrast: widespread adenopathy identified in parotic glands bilateral, submandibular space, anterior and posterior cervical chains, superior mediastinum and supraclavicular and infraclavicular spaces of the base of neck/upper chest in appearance strongly suspicious for lymphoma. Multiple subcutaneous masses are also identified in lower occipital scalp and upper neck similar to lymphadenopathy. Biopsy of prominent subcutaneous mass in right upper neck is recommended and should present the easiest location for tissue sampling    Path:  5/23/18 peripheral blood flow cytometry: CD5+ B cell population. No abnormal T cell or myeloid cell populations. CD19+, CD20+, CD5+, CD23+, CD11c+ surface kappa light chain+, CD38-, CD22+ (dim/mod), CD43+, FMC7-. Consistent with CLL     04/26/2022:  Bone marrow biopsy-marrow -trilineage hematopoiesis with 10-20% B-cell infiltrate, C5 positive B-cell population with immunophenotype typical for CLL.  CLL fish within normal limits, heavy chain mutation status cannot be determined.  6q, chromosome 12, chromosome 13, chromosome 11, chromosome 17 and t( 11;14) abnormalities not detected.     05/06/2022 left axillary lymph node core biopsy:  Mantle cell lymphoma with a subset consistent with CLL/SLL., CD5 positive monoclonal B-cell population is identified, approximately 91% of total cells, immuno phenotype is nonspecific and may be seen in CLL,  marginal zone lymphoma, or large cell lymphoma.  A 2nd small monoclonal B-cell population approximately 2% of the cells identified with an immunophenotype consistent with CLL.  New genomic consultation lymphoma cells positive for CD 20, CD 5, cyclin D1 and BCL2 with a TI 67 of 10%.  There is no significant staining for CD10, BCL6.  However workup consistent with diagnosis of mantle cell lymphoma.           ROS  CONSTITUTIONAL: no fevers, no chills,  No weight loss, no  fatigue, no weakness  HEMATOLOGIC: no abnormal bleeding, no abnormal bruising, no drenching night sweats  ONCOLOGIC: no new masses or lumps  HEENT: no vision loss, no tinnitus or hearing loss, no nose bleeding, no dysphagia, no odynophagia  CVS: no chest pain, no palpitations, no dyspnea on exertion  RESP: no shortness of breath, no hemoptysis, no cough  GI: no nausea, no vomiting, no diarrhea, no constipation, no melena, no hematochezia, no hematemesis, no abdominal pain, no increase in abdominal girth  : no dysuria, no hematuria, no hesitancy, no scrotal swelling, no discharge  INTEGUMENT: no rashes, no abnormal bruising, no nail pitting, no hyperpigmentation  NEURO: no falls, no memory loss, no paresthesias or dysesthesias, no urofecal incontinence or retention, no loss of strength on any extremity  MSK: no back pain, no new joint pain, no joint swelling  PSYCH: no suicidal or homicidal ideation, no depression, no insomnia, no anhedonia  ENDOCRINE: no heat or cold intolerance, no polyuria, no polydipsia           ASSESSMENT:     # Mantle cell lymphoma, [place]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  Positive for CLL.  PET-CT with evidence of bulky lymphadenopathy, largest measuring 14 cm, spleen measuring 15 cm.  Discussed with patient and his family the diagnosis, natural history and treatment options.  Discussed the risk for TLS given bulky  lymphadenopathy, perforation given lymphadenopathy around the bowel loops as well as declining ECOG given his advanced age.  Continuet PJP prophylaxis with Bactrim Monday Wednesday Friday and valacyclovir with treatment.  Continue allopurinol 300 mg q.day for TLS prophylaxis.  Patient is status post cycle 1 of bendamustine and rituximab on 05/18/2022.  Patient has bendamustine was reduced by 25% and rituximab was given 1 week apart to reduce the risk of perforation.  S/p cycle 2 and cycle 3 without significant side effects, dosed for bendamustine increased to 100%.  Discussed with patient interim PET-CT with excellent response to treatment however with residual hypermetabolism in right axillary lymph node with a Deauville score of 4.  Given his excellent tolerance of treatment, will proceed to complete 6 cycles of BR in the setting of above PET-CT report.  Discussed alternate treatment plan to proceed with ibrutinib given partial response.  Patient elected to proceed with another 2 cycles of BR with plans to repeat PET-CT after completion of treatment to assess treatment response.  Cycle 5 with dose reduction by 20% given thrombocytopenia.  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      Based on available information, likely SLL/CLL with some constitutional symptoms including weight loss, fatigue and night sweats. Adenopathy easily palpable above and below the diaphragm. Mild thrombocytopenia w/ PLT count ~120k and very mild anemia with Hg ~12 g/dl. No hypercalcemia, no evidence of immune paresis  Biopsies 4/11/22 with Dr Brannon were FNAs and non diagnostic for CLL. I do not know what IHC was obtained or whether pathologist knew what the potential diagnosis was as report only says negative for carcinoma. Will discuss with Dr Lan to add CD5, CD10, CD19, CD20 etc if enough tissue  Regardless, will also refer for axillary LN biopsy, either excisional or at the very least  core. Referral sent today  PET CT 4/2022 findings are noted. Given his advanced age he was not refer for EGD. Masses in abdomen are likely matted gurinder conglomerates and I do not think it would be worth it to attempt a biopsy of those  CLL FISH  Negative , IGHV mutation status  Hep B and C negative  Allopurinol 300mg daily with PO hydration prior to initiation of any therapy  Given concomitant diagnosis of CLL and mantle cell lymphoma will plan to initiate treatment for mantle cell lymphoma and CLL with bendamustine rituximab.  Will consider BTK inhibitors if patient is intolerant to bendamustine/rituximab in the second-line     PET CT discussed with Dr. Costa and will proceed with ibrutinib as second line  Platelets 87,000 today will hold ibrutinib 1 week and if plts return >100,000 will resume ibrutinib and reduce dose from 560 mg to 420 mg.  Will plan to repeat PET CT once new 420 mg dose has been taken for 4 weeks.     Plan  Platelets 112,000 today. Patient instructed to resume imbrutinib at reduced dose of 420 mg today  C/w dermatology follow-up for skin tags/lesion, probable seborrheic keratoses.  RTC 2 weeks cbc, cmp, mag, phos, uric acid, ldh  Plan to repeat PET CT in 1 month  Contact clinic with questions or concerns                                                                This service was not originating from a related E/M service provided within the previous 7 days nor will  to an E/M service or procedure within the next 24 hours or my soonest available appointment.  Prevailing standard of care was able to be met in this audio-only visit.

## 2023-03-30 ENCOUNTER — OFFICE VISIT (OUTPATIENT)
Dept: HEMATOLOGY/ONCOLOGY | Facility: CLINIC | Age: 83
End: 2023-03-30
Payer: MEDICARE

## 2023-03-30 VITALS — HEIGHT: 64 IN | WEIGHT: 178 LBS | BODY MASS INDEX: 30.39 KG/M2

## 2023-03-30 DIAGNOSIS — C83.13 MANTLE CELL LYMPHOMA OF INTRA-ABDOMINAL LYMPH NODES: Primary | ICD-10-CM

## 2023-03-30 PROCEDURE — 99442 PR PHYSICIAN TELEPHONE EVALUATION 11-20 MIN: CPT | Mod: 95,,,

## 2023-03-30 PROCEDURE — 99442 PR PHYSICIAN TELEPHONE EVALUATION 11-20 MIN: ICD-10-PCS | Mod: 95,,,

## 2023-03-30 NOTE — PROGRESS NOTES
Established Patient - Audio Only Telehealth Visit     The patient location is: Home  The chief complaint leading to consultation is: Mantle Cell lymhoma  Visit type: Virtual visit with audio only (telephone)  Total time spent with patient: 12 minutes       The reason for the audio only service rather than synchronous audio and video virtual visit was related to technical difficulties or patient preference/necessity.     Each patient to whom I provide medical services by telemedicine is:  (1) informed of the relationship between the physician and patient and the respective role of any other health care provider with respect to management of the patient; and (2) notified that they may decline to receive medical services by telemedicine and may withdraw from such care at any time. Patient verbally consented to receive this service via voice-only telephone call.       HPI:   HPI:   PROBLEM:   Mixed hematological/lymph 0 reticular malignancy including;  1. )  CLL/SLL  2. )  Mantle cell lymphoma     HPI: 80 y/o M w/ PMHx of HTN, HLD, GERD, CAD, PORTER referred to [facility] for lymphocytosis and lymphadenopathy    Of note, review of his records reveals a flow cytometry sent in 2018 that was consistent with CLL. He also had a single visit with Dr Snow in 2018 but never followed back.   Patient had a PET-CT as well as a bone marrow biopsy and repeat peripheral flow cytometry with a diagnosis of CLL/SLL and mantle cell lymphoma.  Patient had symptoms of fatigue and weight loss prior to his workup and diagnosis.     Today, 03/30/23, patient via telephone today to review cbc. He is doing well today and denies abnormal bleeding issues. Grade 1 platelets 78,000 today. He denies any fevers, chills, drenching night sweats, new lumps or bumps, decreased appetite or weight loss.        PMHx: HTN, HLD, GERD, CAD, PORTER  PSHx: PCI x6, lipoma, hernia  Social Hx: never smoker, no ETOH, no drugs. He reports prior exposures to asbestos and  pesticides, does not know which ones  Family Hx: father: colon cancer, sister: breast cancer, colon cancer  Meds: reviewed  Allergies: NKDA    Labs:  03/28/23: CMP unremarkable except ca 10.2, mag 2.2, phosphorus 3.0, , uric acid 4.2, wbc 3.43, hgb 13.3, plt 78, ANC 2.48  03/10/23: CMP unremarkable, wbc 2.57, hgb 12.8, plt 87, ANC 1.85  03/01/23: cr 0.96, ca 9.2, alb 3.4, AST 35, ALT 24, , mag 1.9, phosphorus 3.4, uric acid 3.8, wbc 3.23, hgb 12.5, plt 74,000, ANC 2.39  02/01/23: Cr 0.92, ca 9.6, alb 3.6, LFTs WNL, wbc 3.08, hgb 12.6, plt 102, ANC 2.15, ALC 0.25  01/20/23; cr 0.97, alb 3.5, LFTs WNL, wbc 3.06, hgb 12.8, plt 64, ANC 2.25  11/22/22: cr 0.86, alb 3.7, LFTs WNL, , uric acid 5.1, wbc 2.16, hgb 12.8, plt 76, mcv 107.8, ALC 0.35, ANC 1.11  10/25/2022:  Creatinine 0.89, albumin 3.5, LFTs within normal limits, , uric acid 3.6.  WBC count 3.15, hemoglobin 12.7, .3, platelet count 125 1000, ANC 2.28.  10/11/2022:  Creatinine 1.04, albumin 3.6, LFTs within normal limits, magnesium 2.0, phosphorus 2.7, , uric acid 3.2, WBC 2.4, hemoglobin 13.4, .5, platelet count 79777.  ANC 1.77.  09/13/2022:  Creatinine 0.80, albumin 3.7, LFTs within normal limits, , uric acid 4.0, WBC count 2.37, hemoglobin 13.2, , platelet count 40767, ANC 1.45.  08/16/2022:  Creatinine 0.81, albumin 3.8, calcium 9.2, total bili 0.5, LFTs within normal limits, uric acid 3.3, , WBC 3.45, hemoglobin 13.7, .8, platelet count 03940, ANC 2.06.  07/19/2022:  WBC 3.75, hemoglobin 13.1, .5, platelet count 34294, ANC 2.32, uric acid 4.1, , creatinine 1.09, albumin 3.5, calcium 9.6, LFTs within normal limits.  06/21/2022 WBC at 2.58 bed neutrophil adequate at 55%.  Hemoglobin 12.7, hematocrit 39.4.  06/14/2022:  Creatinine 0.99, calcium 9.1, , uric acid 3.7, phosphorus 3.3, potassium 4.5, WBC count 2.58, hemoglobin 12.7, platelet count 93586, ANC  1.44.  2022:  Potassium 4.9, sodium 140, creatinine 0.87, , uric acid 6.6.  2022:  Creatinine 1.03, albumin 3.3, ,  WBC count 10.32, hemoglobin 12.4, platelet count 135 1000.   2022:  Creatinine 0.93, albumin 3.6, total protein 6.4, , potassium 5.2, WBC count 9.6, hemoglobin 11.4, .4, platelet count 92.   4/ Cr 0.89 Alb 3.4 TP 6.4 Ca 9.4  TSH 2.0 Iron 58n TIBC 285 Ferritin 48 Folate 9.9 B12 426 Hep B s ag neg Uric acid 7.8 Hep C ab neg WBC 9.79 RBC 3.77 Hg 11.8 .5 RDW 13.4 PLT 91 ANC 2.79 ALC 6.36 Direct Ramos neg b2 micro 7.5 Copper 135 Hapto 225 Hep B core ab neg SPEP w/ ARABELLA normal Abs kappa 32 Abs lambda 124 SFLC ratio 0.26  3/18/22 Cr 1 AST 27 ALT 12 AlkPhos 87 Alb 4.1 TP 6.5 Ca 9.8 WBC 9.4 RBC 3.92 Hg 12.2 MCV 96 RDW 15.1  ALC 5.8 ANC 3  5/15/18 WBC 14.71 RBC 4.42 Hg 14.3 MCV 98.6 RDW 13  ANC 2.91 ALC 10.13 AMC 1.29    Imagin22 PET CT: Stable right axillary lymph node with mild-to-moderate uptake, representing residual metabolically active tumor. No new suspicious uptake noted     2022 PET-CT:  Excellent response to therapy since her last exam with interval resolution or near complete resolution of multiple hypermetabolic lymph nodes above and below the diaphragm.  There is residual mild-to-moderate uptake in the right axillary lymph node, Deauville score 4  Interval resolution of diffuse uptake in spleen which is also decreased in size.  No new suspicious uptake.     05/10/22:-  right lower extremity Doppler negative for DVT       22 PET CT: multiple hypermetabolic LNs b/l neck. Right submandibular LN .7x1.7cm, SUV 7.8. Left submandibular LN 2.3x1.7cm SUV 7.2. Multiple hypermetabolic LNs in chest. Right axillary node 3.3x2cm SUV 8.1. Hypermetabolic left axillary LN SUV 6.4, 1.9x3.2cm. Hypermetabolic right paratracheal LN 2.4x1.6cm SUV 4.9. Right pulmonary hilum SUV 2.7. Subcarinal LN SUV 3.3, 1.2x3.3cm. Spleen  enlarged at 15cm and demonstrates heterogeneous mild to moderate uptake SUV 5.2. Moderately intense activity in stomach and proximal duodenum with no degree of focality or discrete mass on CT. Multiple hypermetabolic LNs in abd and pelvis. Left periaortic LN SUV 6.8, 4x3.5cm. Large hypermetabolic mesenteric mass [id number] SUV 8.5. Aortocaval LN 3.5x3.8cm SUV 5.1. Hypermetabolic mass in left pelvis 5.5x7cm SUV 7. Left external iliac LN SUV 6 2.2x2.1cm. Extensive hypermetabolism anteriorly and laterally in left abdomen, associated with soft tissue thickening that is difficult to separate from bowel loops. B/l inguinal LNs up to 2.9x3.9cm, SUV 6.6    3/29/22 CT neck w/ and w/o contrast: widespread adenopathy identified in parotic glands bilateral, submandibular space, anterior and posterior cervical chains, superior mediastinum and supraclavicular and infraclavicular spaces of the base of neck/upper chest in appearance strongly suspicious for lymphoma. Multiple subcutaneous masses are also identified in lower occipital scalp and upper neck similar to lymphadenopathy. Biopsy of prominent subcutaneous mass in right upper neck is recommended and should present the easiest location for tissue sampling    Path:  5/23/18 peripheral blood flow cytometry: CD5+ B cell population. No abnormal T cell or myeloid cell populations. CD19+, CD20+, CD5+, CD23+, CD11c+ surface kappa light chain+, CD38-, CD22+ (dim/mod), CD43+, FMC7-. Consistent with CLL     04/26/2022:  Bone marrow biopsy-marrow -trilineage hematopoiesis with 10-20% B-cell infiltrate, C5 positive B-cell population with immunophenotype typical for CLL.  CLL fish within normal limits, heavy chain mutation status cannot be determined.  6q, chromosome 12, chromosome 13, chromosome 11, chromosome 17 and t( 11;14) abnormalities not detected.     05/06/2022 left axillary lymph node core biopsy:  Mantle cell lymphoma with a subset consistent with CLL/SLL., CD5 positive  monoclonal B-cell population is identified, approximately 91% of total cells, immuno phenotype is nonspecific and may be seen in CLL, marginal zone lymphoma, or large cell lymphoma.  A 2nd small monoclonal B-cell population approximately 2% of the cells identified with an immunophenotype consistent with CLL.  New genomic consultation lymphoma cells positive for CD 20, CD 5, cyclin D1 and BCL2 with a TI 67 of 10%.  There is no significant staining for CD10, BCL6.  However workup consistent with diagnosis of mantle cell lymphoma.           ROS  CONSTITUTIONAL: no fevers, no chills,  No weight loss, no  fatigue, no weakness  HEMATOLOGIC: no abnormal bleeding, no abnormal bruising, no drenching night sweats  ONCOLOGIC: no new masses or lumps  HEENT: no vision loss, no tinnitus or hearing loss, no nose bleeding, no dysphagia, no odynophagia  CVS: no chest pain, no palpitations, no dyspnea on exertion  RESP: no shortness of breath, no hemoptysis, no cough  GI: no nausea, no vomiting, no diarrhea, no constipation, no melena, no hematochezia, no hematemesis, no abdominal pain, no increase in abdominal girth  : no dysuria, no hematuria, no hesitancy, no scrotal swelling, no discharge  INTEGUMENT: no rashes, no abnormal bruising, no nail pitting, no hyperpigmentation  NEURO: no falls, no memory loss, no paresthesias or dysesthesias, no urofecal incontinence or retention, no loss of strength on any extremity  MSK: no back pain, no new joint pain, no joint swelling  PSYCH: no suicidal or homicidal ideation, no depression, no insomnia, no anhedonia  ENDOCRINE: no heat or cold intolerance, no polyuria, no polydipsia           ASSESSMENT:     # Mantle cell lymphoma, [place]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  Positive for CLL.  PET-CT with evidence of bulky lymphadenopathy, largest measuring 14 cm, spleen measuring 15  es.  Discussed with patient and his family the diagnosis, natural history and treatment options.  Discussed the risk for TLS given bulky lymphadenopathy, perforation given lymphadenopathy around the bowel loops as well as declining ECOG given his advanced age.  Continuet PJP prophylaxis with Bactrim Monday Wednesday Friday and valacyclovir with treatment.  Continue allopurinol 300 mg q.day for TLS prophylaxis.  Patient is status post cycle 1 of bendamustine and rituximab on 05/18/2022.  Patient has bendamustine was reduced by 25% and rituximab was given 1 week apart to reduce the risk of perforation.  S/p cycle 2 and cycle 3 without significant side effects, dosed for bendamustine increased to 100%.  Discussed with patient interim PET-CT with excellent response to treatment however with residual hypermetabolism in right axillary lymph node with a Deauville score of 4.  Given his excellent tolerance of treatment, will proceed to complete 6 cycles of BR in the setting of above PET-CT report.  Discussed alternate treatment plan to proceed with ibrutinib given partial response.  Patient elected to proceed with another 2 cycles of BR with plans to repeat PET-CT after completion of treatment to assess treatment response.  Cycle 5 with dose reduction by 20% given thrombocytopenia.  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      Based on available information, likely SLL/CLL with some constitutional symptoms including weight loss, fatigue and night sweats. Adenopathy easily palpable above and below the diaphragm. Mild thrombocytopenia w/ PLT count ~120k and very mild anemia with Hg ~12 g/dl. No hypercalcemia, no evidence of immune paresis  Biopsies 4/11/22 with Dr Brannon were FNAs and non diagnostic for CLL. I do not know what IHC was obtained or whether pathologist knew what the potential diagnosis was as report only says negative for carcinoma. Will discuss with Dr Lan to  add CD5, CD10, CD19, CD20 etc if enough tissue  Regardless, will also refer for axillary LN biopsy, either excisional or at the very least core. Referral sent today  PET CT 4/2022 findings are noted. Given his advanced age he was not refer for EGD. Masses in abdomen are likely matted gurinder conglomerates and I do not think it would be worth it to attempt a biopsy of those  CLL FISH  Negative , IGHV mutation status  Hep B and C negative  Allopurinol 300mg daily with PO hydration prior to initiation of any therapy  Given concomitant diagnosis of CLL and mantle cell lymphoma will plan to initiate treatment for mantle cell lymphoma and CLL with bendamustine rituximab.  Will consider BTK inhibitors if patient is intolerant to bendamustine/rituximab in the second-line     PET CT discussed with Dr. Costa and will proceed with ibrutinib as second line  Platelets 87,000 today will hold ibrutinib 1 week and if plts return >100,000 will resume ibrutinib and reduce dose from 560 mg to 420 mg.  Will plan to repeat PET CT once new 420 mg dose has been taken for 4 weeks.     Plan  Grade 1 platelets 78,000 today. Will continue imbrutinib at reduced dose of 420 mg.   C/w dermatology follow-up for skin tags/lesion, probable seborrheic keratoses.  RTC 2 weeks cbc, cmp, mag, phos, uric acid, ldh  Repeat PET CT ordered today  Contact clinic with questions or concerns                           This service was not originating from a related E/M service provided within the previous 7 days nor will  to an E/M service or procedure within the next 24 hours or my soonest available appointment.  Prevailing standard of care was able to be met in this audio-only visit.

## 2023-04-03 ENCOUNTER — TELEPHONE (OUTPATIENT)
Dept: HEMATOLOGY/ONCOLOGY | Facility: CLINIC | Age: 83
End: 2023-04-03
Payer: MEDICARE

## 2023-04-03 DIAGNOSIS — B02.23 SHINGLES (HERPES ZOSTER) POLYNEUROPATHY: Primary | ICD-10-CM

## 2023-04-03 DIAGNOSIS — C83.13 MANTLE CELL LYMPHOMA OF INTRA-ABDOMINAL LYMPH NODES: ICD-10-CM

## 2023-04-03 RX ORDER — TRAMADOL HYDROCHLORIDE 50 MG/1
50 TABLET ORAL EVERY 6 HOURS PRN
Qty: 30 TABLET | Refills: 0 | Status: SHIPPED | OUTPATIENT
Start: 2023-04-03

## 2023-04-03 RX ORDER — SULFAMETHOXAZOLE AND TRIMETHOPRIM 800; 160 MG/1; MG/1
1 TABLET ORAL
Qty: 30 TABLET | Refills: 3 | Status: SHIPPED | OUTPATIENT
Start: 2023-04-03

## 2023-04-03 RX ORDER — VALACYCLOVIR HYDROCHLORIDE 500 MG/1
500 TABLET, FILM COATED ORAL 2 TIMES DAILY
Qty: 60 TABLET | Refills: 3 | Status: SHIPPED | OUTPATIENT
Start: 2023-04-03 | End: 2023-05-03

## 2023-04-03 NOTE — TELEPHONE ENCOUNTER
Pt's daughter reports blisterlike rash and severe pain to Rt side of abdomen which started 2 days ago. States not currently on Valacyclovir and does not know when he stopped taking it. Please advise.--SC, LPN

## 2023-04-14 ENCOUNTER — OFFICE VISIT (OUTPATIENT)
Dept: HEMATOLOGY/ONCOLOGY | Facility: CLINIC | Age: 83
End: 2023-04-14
Payer: MEDICARE

## 2023-04-14 VITALS
RESPIRATION RATE: 18 BRPM | BODY MASS INDEX: 30.07 KG/M2 | DIASTOLIC BLOOD PRESSURE: 82 MMHG | HEART RATE: 66 BPM | HEIGHT: 64 IN | OXYGEN SATURATION: 99 % | TEMPERATURE: 98 F | WEIGHT: 176.13 LBS | SYSTOLIC BLOOD PRESSURE: 136 MMHG

## 2023-04-14 DIAGNOSIS — C83.13 MANTLE CELL LYMPHOMA OF INTRA-ABDOMINAL LYMPH NODES: Primary | ICD-10-CM

## 2023-04-14 PROCEDURE — 99215 PR OFFICE/OUTPT VISIT, EST, LEVL V, 40-54 MIN: ICD-10-PCS | Mod: ,,, | Performed by: STUDENT IN AN ORGANIZED HEALTH CARE EDUCATION/TRAINING PROGRAM

## 2023-04-14 PROCEDURE — 99215 OFFICE O/P EST HI 40 MIN: CPT | Mod: ,,, | Performed by: STUDENT IN AN ORGANIZED HEALTH CARE EDUCATION/TRAINING PROGRAM

## 2023-04-14 RX ORDER — ESCITALOPRAM OXALATE 10 MG/1
1 TABLET ORAL DAILY
COMMUNITY

## 2023-04-14 RX ORDER — PREGABALIN 50 MG/1
CAPSULE ORAL
COMMUNITY

## 2023-04-14 NOTE — PROGRESS NOTES
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     Treatment history    05/18/2022-10/26/2022:  6 cycles of bendamustine rituximab  December 2022:  Ibrutinib 560 mg q.day  03/16/2023-current:  Ibrutinib 420 mg q.d.      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 He also had a single visit with Dr Snow in 2018 but never followed back.   Patient had a PET-CT as well as a bone marrow biopsy and repeat peripheral flow cytometry with a diagnosis of CLL/SLL and mantle cell lymphoma.  Patient had symptoms of fatigue and weight loss prior to his workup and diagnosis.  He was diagnosed with mantle cell lymphoma and CLL and started on treatment with bendamustine rituximab, with incomplete response following which he was started on ibrutinib.      Interval history  04/14/2023, patient was diagnosed with shingles since his last follow-up visit with us following which he was treated with valacyclovir with his PCP and is currently on valacyclovir 500 mg b.i.d. he also had an hospitalization at [facility] 2 weeks back after he had a syncopal episode and was discharged with follow-up with Cardiology outpatient.  He is currently off ibrutinib after being diagnosed with shingles recently.    Labs:  04/12/2023:  Creatinine 0.83, albumin 3.5, calcium 9.2, , uric acid 3.8, LFTs within normal limits, magnesium 2.0, phosphorus 2.6, WBC count 2.7, hemoglobin 12.6, ANC 2000, ALC 0.28.  03/28/23: CMP unremarkable except ca 10.2, mag 2.2, phosphorus 3.0, , uric acid 4.2, wbc 3.43, hgb 13.3, plt 78, ANC 2.48  03/10/23: CMP unremarkable, wbc 2.57, hgb 12.8, plt 87, ANC 1.85  03/01/23:  cr 0.96, ca 9.2, alb 3.4, AST 35, ALT 24, , mag 1.9, phosphorus 3.4, uric acid 3.8, wbc 3.23, hgb 12.5, plt 74,000, ANC 2.39  02/01/23: Cr 0.92, ca 9.6, alb 3.6, LFTs WNL, wbc 3.08, hgb 12.6, plt 102, ANC 2.15, ALC 0.25  01/20/23; cr 0.97, alb 3.5, LFTs WNL, wbc 3.06, hgb 12.8, plt 64, ANC 2.25  11/22/22: cr 0.86, alb 3.7, LFTs WNL, , uric acid 5.1, wbc 2.16, hgb 12.8, plt 76, mcv 107.8, ALC 0.35, ANC 1.11  10/25/2022:  Creatinine 0.89, albumin 3.5, LFTs within normal limits, , uric acid 3.6.  WBC count 3.15, hemoglobin 12.7, .3, platelet count 125 1000, ANC 2.28.  10/11/2022:  Creatinine 1.04, albumin 3.6, LFTs within normal limits, magnesium 2.0, phosphorus 2.7, , uric acid 3.2, WBC 2.4, hemoglobin 13.4, .5, platelet count 24230.  ANC 1.77.  09/13/2022:  Creatinine 0.80, albumin 3.7, LFTs within normal limits, , uric acid 4.0, WBC count 2.37, hemoglobin 13.2, , platelet count 78541, ANC 1.45.  08/16/2022:  Creatinine 0.81, albumin 3.8, calcium 9.2, total bili 0.5, LFTs within normal limits, uric acid 3.3, , WBC 3.45, hemoglobin 13.7, .8, platelet count 61648, ANC 2.06.  07/19/2022:  WBC 3.75, hemoglobin 13.1, .5, platelet count 41091, ANC 2.32, uric acid 4.1, , creatinine 1.09, albumin 3.5, calcium 9.6, LFTs within normal limits.  06/21/2022 WBC at 2.58 bed neutrophil adequate at 55%.  Hemoglobin 12.7, hematocrit 39.4.  06/14/2022:  Creatinine 0.99, calcium 9.1, , uric acid 3.7, phosphorus 3.3, potassium 4.5, WBC count 2.58, hemoglobin 12.7, platelet count 33553, ANC 1.44.  05/27/2022:  Potassium 4.9, sodium 140, creatinine 0.87, , uric acid 6.6.  05/17/2022:  Creatinine 1.03, albumin 3.3, ,  WBC count 10.32, hemoglobin 12.4, platelet count 135 1000.   05/02/2022:  Creatinine 0.93, albumin 3.6, total protein 6.4, , potassium 5.2, WBC count 9.6, hemoglobin 11.4, .4, platelet count 92.   4/12/22 Cr  0.89 Alb 3.4 TP 6.4 Ca 9.4  TSH 2.0 Iron 58n TIBC 285 Ferritin 48 Folate 9.9 B12 426 Hep B s ag neg Uric acid 7.8 Hep C ab neg WBC 9.79 RBC 3.77 Hg 11.8 .5 RDW 13.4 PLT 91 ANC 2.79 ALC 6.36 Direct Ramos neg b2 micro 7.5 Copper 135 Hapto 225 Hep B core ab neg SPEP w/ ARABELLA normal Abs kappa 32 Abs lambda 124 SFLC ratio 0.26  3/18/22 Cr 1 AST 27 ALT 12 AlkPhos 87 Alb 4.1 TP 6.5 Ca 9.8 WBC 9.4 RBC 3.92 Hg 12.2 MCV 96 RDW 15.1  ALC 5.8 ANC 3  5/15/18 WBC 14.71 RBC 4.42 Hg 14.3 MCV 98.6 RDW 13  ANC 2.91 ALC 10.13 AMC 1.29    Imagin2023:  PET-CT-interval resolution of moderate uptake in right axillary lymph node with no current PET evidence of metabolically active residual or recurrent lymphoma.    22 PET CT: Stable right axillary lymph node with mild-to-moderate uptake, representing residual metabolically active tumor. No new suspicious uptake noted     2022 PET-CT:  Excellent response to therapy since her last exam with interval resolution or near complete resolution of multiple hypermetabolic lymph nodes above and below the diaphragm.  There is residual mild-to-moderate uptake in the right axillary lymph node, Deauville score 4  Interval resolution of diffuse uptake in spleen which is also decreased in size.  No new suspicious uptake.     05/10/22:-  right lower extremity Doppler negative for DVT       22 PET CT: multiple hypermetabolic LNs b/l neck. Right submandibular LN .7x1.7cm, SUV 7.8. Left submandibular LN 2.3x1.7cm SUV 7.2. Multiple hypermetabolic LNs in chest. Right axillary node 3.3x2cm SUV 8.1. Hypermetabolic left axillary LN SUV 6.4, 1.9x3.2cm. Hypermetabolic right paratracheal LN 2.4x1.6cm SUV 4.9. Right pulmonary hilum SUV 2.7. Subcarinal LN SUV 3.3, 1.2x3.3cm. Spleen enlarged at 15cm and demonstrates heterogeneous mild to moderate uptake SUV 5.2. Moderately intense activity in stomach and proximal duodenum with no degree of focality or discrete mass  on CT. Multiple hypermetabolic LNs in abd and pelvis. Left periaortic LN SUV 6.8, 4x3.5cm. Large hypermetabolic mesenteric mass [id number] SUV 8.5. Aortocaval LN 3.5x3.8cm SUV 5.1. Hypermetabolic mass in left pelvis 5.5x7cm SUV 7. Left external iliac LN SUV 6 2.2x2.1cm. Extensive hypermetabolism anteriorly and laterally in left abdomen, associated with soft tissue thickening that is difficult to separate from bowel loops. B/l inguinal LNs up to 2.9x3.9cm, SUV 6.6    3/29/22 CT neck w/ and w/o contrast: widespread adenopathy identified in parotic glands bilateral, submandibular space, anterior and posterior cervical chains, superior mediastinum and supraclavicular and infraclavicular spaces of the base of neck/upper chest in appearance strongly suspicious for lymphoma. Multiple subcutaneous masses are also identified in lower occipital scalp and upper neck similar to lymphadenopathy. Biopsy of prominent subcutaneous mass in right upper neck is recommended and should present the easiest location for tissue sampling    Path:  5/23/18 peripheral blood flow cytometry: CD5+ B cell population. No abnormal T cell or myeloid cell populations. CD19+, CD20+, CD5+, CD23+, CD11c+ surface kappa light chain+, CD38-, CD22+ (dim/mod), CD43+, FMC7-. Consistent with CLL     04/26/2022:  Bone marrow biopsy-marrow -trilineage hematopoiesis with 10-20% B-cell infiltrate, C5 positive B-cell population with immunophenotype typical for CLL.  CLL fish within normal limits, heavy chain mutation status cannot be determined.  6q, chromosome 12, chromosome 13, chromosome 11, chromosome 17 and t( 11;14) abnormalities not detected.     05/06/2022 left axillary lymph node core biopsy:  Mantle cell lymphoma with a subset consistent with CLL/SLL., CD5 positive monoclonal B-cell population is identified, approximately 91% of total cells, immuno phenotype is nonspecific and may be seen in CLL, marginal zone lymphoma, or large cell lymphoma.  A 2nd  small monoclonal B-cell population approximately 2% of the cells identified with an immunophenotype consistent with CLL.  New genomic consultation lymphoma cells positive for CD 20, CD 5, cyclin D1 and BCL2 with a TI 67 of 10%.  There is no significant staining for CD10, BCL6.  However workup consistent with diagnosis of mantle cell lymphoma.        Past Medical History:   Diagnosis Date    Cataract     Lymphoma        Past Surgical History:   Procedure Laterality Date    HERNIA REPAIR         Family History   Problem Relation Age of Onset    Kidney disease Mother     Colon cancer Father     Colon cancer Sister     Breast cancer Sister     Uterine cancer Sister        Social History     Socioeconomic History    Marital status:    Tobacco Use    Smoking status: Never    Smokeless tobacco: Never   Substance and Sexual Activity    Alcohol use: Not Currently    Drug use: Never       Current Outpatient Medications   Medication Sig Dispense Refill    allopurinoL (ZYLOPRIM) 300 MG tablet Take 1 tablet (300 mg total) by mouth once daily. 30 tablet 5    aspirin (ECOTRIN) 81 MG EC tablet Take 81 mg by mouth once daily.      carvediloL (COREG) 12.5 MG tablet Take 1 tablet by mouth 2 (two) times a day.      EScitalopram oxalate (LEXAPRO) 10 MG tablet Take 1 tablet by mouth once daily.      ezetimibe (ZETIA) 10 mg tablet Take 10 mg by mouth once daily.      ibrutinib (IMBRUVICA) 420 mg Tab Take 420 mg by mouth once daily. 30 tablet 3    ondansetron (ZOFRAN) 8 MG tablet Take 1 tablet (8 mg total) by mouth every 8 (eight) hours as needed for Nausea. 30 tablet 1    rosuvastatin (CRESTOR) 20 MG tablet Take 20 mg by mouth once daily.      sulfamethoxazole-trimethoprim 800-160mg (BACTRIM DS) 800-160 mg Tab Take 1 tablet by mouth every Mon, Wed, Fri. 30 tablet 3    traMADoL (ULTRAM) 50 mg tablet Take 1 tablet by mouth 4 (four) times daily as needed.      traMADoL (ULTRAM) 50 mg tablet Take 1 tablet (50 mg total) by mouth every 6  (six) hours as needed for Pain. 30 tablet 0    valACYclovir (VALTREX) 500 MG tablet Take 1 tablet (500 mg total) by mouth 2 (two) times daily. 60 tablet 3    pregabalin (LYRICA) 50 MG capsule pregabalin 50 mg capsule   TAKE 1 CAPSULE BY MOUTH TWICE DAILY       No current facility-administered medications for this visit.       Review of patient's allergies indicates:   Allergen Reactions    Pregabalin           ROS  CONSTITUTIONAL: no fevers, no chills,  No weight loss, no  fatigue, no weakness  HEMATOLOGIC: no abnormal bleeding, no abnormal bruising, no drenching night sweats  ONCOLOGIC: no new masses or lumps  HEENT: no vision loss, no tinnitus or hearing loss, no nose bleeding, no dysphagia, no odynophagia  CVS: no chest pain, no palpitations, no dyspnea on exertion  RESP: no shortness of breath, no hemoptysis, no cough  GI: no nausea, no vomiting, no diarrhea, no constipation, no melena, no hematochezia, no hematemesis, no abdominal pain, no increase in abdominal girth  : no dysuria, no hematuria, no hesitancy, no scrotal swelling, no discharge  INTEGUMENT: no rashes, no abnormal bruising, no nail pitting, no hyperpigmentation  NEURO: no falls, no memory loss, no paresthesias or dysesthesias, no urofecal incontinence or retention, no loss of strength on any extremity  MSK: no back pain, no new joint pain, no joint swelling  PSYCH: no suicidal or homicidal ideation, no depression, no insomnia, no anhedonia  ENDOCRINE: no heat or cold intolerance, no polyuria, no polydipsia          Physical exam     Vitals:    04/14/23 0931   BP: 136/82   Pulse: 66   Resp: 18   Temp: 98 °F (36.7 °C)       GEN: AAOx3, NAD  HEENT: PERRLA, EOMI, edentulous, no oral ulcers  NECK:  Supple, full range of motion  LYMPH:  No axillary, supra clavicle or cervical adenopathy appreciated on today's exam.  CVS: s1s2 RRR, no M/R/G, port in place in right chest wall without surrounding edema or erythema.  RESP: CTA b/l, no crackles, no wheezes  or rhonchi  ABD: soft, NT, ND, BS+, no hepatomegaly, mild splenomegaly  EXT: no deformities, right lower extremity edema.  SKIN:  No rash,.  No evidence of petechia.  NEURO: normal mentation, strength 5/5 on all 4 extremities, no sensory deficits         ASSESSMENT:     # Mantle cell lymphoma, [place]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  Positive for CLL.  PET-CT with evidence of bulky lymphadenopathy, largest measuring 14 cm, spleen measuring 15 cm.  Discussed with patient and his family the diagnosis, natural history and treatment options.  Discussed the risk for TLS given bulky lymphadenopathy, perforation given lymphadenopathy around the bowel loops as well as declining ECOG given his advanced age.  Continuet PJP prophylaxis with Bactrim Monday Wednesday Friday and valacyclovir with treatment.  Continue allopurinol 300 mg q.day for TLS prophylaxis.  Patient is status post cycle 1 of bendamustine and rituximab on 05/18/2022.  Patient has bendamustine was reduced by 25% and rituximab was given 1 week apart to reduce the risk of perforation.  S/p cycle 2 and cycle 3 without significant side effects, dosed for bendamustine increased to 100%.  Discussed with patient interim PET-CT with excellent response to treatment however with residual hypermetabolism in right axillary lymph node with a Deauville score of 4.  Given his excellent tolerance of treatment, will proceed to complete 6 cycles of BR in the setting of above PET-CT report.  Discussed alternate treatment plan to proceed with ibrutinib given partial response.  Patient elected to proceed with another 2 cycles of BR with plans to repeat PET-CT after completion of treatment to assess treatment response.  Given incomplete response on PET-CT after completion of 6 hours NH, he was started on ibrutinib in second-line initially at 560 mg q.day.    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      Based on available information, likely SLL/CLL with some constitutional symptoms including weight loss, fatigue and night sweats. Adenopathy easily palpable above and below the diaphragm. Mild thrombocytopenia w/ PLT count ~120k and very mild anemia with Hg ~12 g/dl. No hypercalcemia, no evidence of immune paresis  Biopsies 4/11/22 with Dr Brannon were FNAs and non diagnostic for CLL. I do not know what IHC was obtained or whether pathologist knew what the potential diagnosis was as report only says negative for carcinoma. Will discuss with Dr Lan to add CD5, CD10, CD19, CD20 etc if enough tissue  Regardless, will also refer for axillary LN biopsy, either excisional or at the very least core. Referral sent today  PET CT 4/2022 findings are noted. Given his advanced age he was not refer for EGD. Masses in abdomen are likely matted gurinder conglomerates and I do not think it would be worth it to attempt a biopsy of those  CLL FISH  Negative , IGHV mutation status  Hep B and C negative  Allopurinol 300mg daily with PO hydration prior to initiation of any therapy  Given concomitant diagnosis of CLL and mantle cell lymphoma will plan to initiate treatment for mantle cell lymphoma and CLL with bendamustine rituximab.  Will consider BTK inhibitors if patient is intolerant to bendamustine/rituximab in the second-line      Plan  Continue to hold ibrutinib given recent diagnosis of shingles.    Plan to follow-up in 3 weeks with repeat labs prior to initiating dose reduce ibrutinib at 420 mg q.day.    Continue valacyclovir  and Bactrim for prophylaxis.        A total of  40 minutes were spent in review of records, interpretation of test, coordination of care, discussion and counseling with the patient.          Portions of the record may have been created with voice recognition software. Occasional wrong-word or sound-a-like substitutions may have occurred due to the inherent limitations of voice recognition software. Read the chart carefully and recognize, using context, where substitutions have occurred.

## 2023-05-05 ENCOUNTER — OFFICE VISIT (OUTPATIENT)
Dept: HEMATOLOGY/ONCOLOGY | Facility: CLINIC | Age: 83
End: 2023-05-05
Payer: MEDICARE

## 2023-05-05 VITALS
TEMPERATURE: 98 F | WEIGHT: 177.38 LBS | BODY MASS INDEX: 30.28 KG/M2 | HEART RATE: 67 BPM | OXYGEN SATURATION: 96 % | DIASTOLIC BLOOD PRESSURE: 76 MMHG | HEIGHT: 64 IN | RESPIRATION RATE: 20 BRPM | SYSTOLIC BLOOD PRESSURE: 138 MMHG

## 2023-05-05 DIAGNOSIS — C83.13 MANTLE CELL LYMPHOMA OF INTRA-ABDOMINAL LYMPH NODES: Primary | ICD-10-CM

## 2023-05-05 DIAGNOSIS — Z51.11 ENCOUNTER FOR ANTINEOPLASTIC CHEMOTHERAPY: ICD-10-CM

## 2023-05-05 PROCEDURE — 99214 PR OFFICE/OUTPT VISIT, EST, LEVL IV, 30-39 MIN: ICD-10-PCS | Mod: ,,, | Performed by: STUDENT IN AN ORGANIZED HEALTH CARE EDUCATION/TRAINING PROGRAM

## 2023-05-05 PROCEDURE — 99214 OFFICE O/P EST MOD 30 MIN: CPT | Mod: ,,, | Performed by: STUDENT IN AN ORGANIZED HEALTH CARE EDUCATION/TRAINING PROGRAM

## 2023-05-05 NOTE — PROGRESS NOTES
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     Treatment history    05/18/2022-10/26/2022:  6 cycles of bendamustine rituximab  December 2022:  Ibrutinib 560 mg q.day  03/16/2023-current:  Ibrutinib 420 mg q.d.      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 He also had a single visit with Dr Snow in 2018 but never followed back.   Patient had a PET-CT as well as a bone marrow biopsy and repeat peripheral flow cytometry with a diagnosis of CLL/SLL and mantle cell lymphoma.  Patient had symptoms of fatigue and weight loss prior to his workup and diagnosis.  He was diagnosed with mantle cell lymphoma and CLL and started on treatment with bendamustine rituximab, with incomplete response following which he was started on ibrutinib.      Interval history     Today, 05/05/2023, patient denies any acute concerns since last follow-up visit with us.  He denies any fevers, chills, drenching night sweats, decreased appetite, weight loss.  He reports mild discomfort around his shingles rash but denies any obvious new lesions.    Labs:  05/03/2023:  Creatinine 0.86, albumin 3.6, calcium 9.3, alkaline phosphatase 61, total bili 0.5, AST 19, ALT 16, magnesium 2.2, phosphorus 3.2, , uric acid 6.5, WBC count 2.6, hemoglobin 12.7, .3, platelet count 124, ANC 1.69.  04/12/2023:  Creatinine 0.83, albumin 3.5, calcium 9.2, , uric acid 3.8, LFTs within normal limits, magnesium 2.0, phosphorus 2.6, WBC count 2.7, hemoglobin 12.6, ANC 2000, ALC 0.28.  03/28/23: CMP unremarkable except ca 10.2, mag 2.2, phosphorus 3.0, , uric acid 4.2, wbc 3.43, hgb 13.3, plt 78, ANC  2.48  03/10/23: CMP unremarkable, wbc 2.57, hgb 12.8, plt 87, ANC 1.85  03/01/23: cr 0.96, ca 9.2, alb 3.4, AST 35, ALT 24, , mag 1.9, phosphorus 3.4, uric acid 3.8, wbc 3.23, hgb 12.5, plt 74,000, ANC 2.39  02/01/23: Cr 0.92, ca 9.6, alb 3.6, LFTs WNL, wbc 3.08, hgb 12.6, plt 102, ANC 2.15, ALC 0.25  01/20/23; cr 0.97, alb 3.5, LFTs WNL, wbc 3.06, hgb 12.8, plt 64, ANC 2.25  11/22/22: cr 0.86, alb 3.7, LFTs WNL, , uric acid 5.1, wbc 2.16, hgb 12.8, plt 76, mcv 107.8, ALC 0.35, ANC 1.11  10/25/2022:  Creatinine 0.89, albumin 3.5, LFTs within normal limits, , uric acid 3.6.  WBC count 3.15, hemoglobin 12.7, .3, platelet count 125 1000, ANC 2.28.  10/11/2022:  Creatinine 1.04, albumin 3.6, LFTs within normal limits, magnesium 2.0, phosphorus 2.7, , uric acid 3.2, WBC 2.4, hemoglobin 13.4, .5, platelet count 17913.  ANC 1.77.  09/13/2022:  Creatinine 0.80, albumin 3.7, LFTs within normal limits, , uric acid 4.0, WBC count 2.37, hemoglobin 13.2, , platelet count 29505, ANC 1.45.  08/16/2022:  Creatinine 0.81, albumin 3.8, calcium 9.2, total bili 0.5, LFTs within normal limits, uric acid 3.3, , WBC 3.45, hemoglobin 13.7, .8, platelet count 57455, ANC 2.06.  07/19/2022:  WBC 3.75, hemoglobin 13.1, .5, platelet count 37345, ANC 2.32, uric acid 4.1, , creatinine 1.09, albumin 3.5, calcium 9.6, LFTs within normal limits.  06/21/2022 WBC at 2.58 bed neutrophil adequate at 55%.  Hemoglobin 12.7, hematocrit 39.4.  06/14/2022:  Creatinine 0.99, calcium 9.1, , uric acid 3.7, phosphorus 3.3, potassium 4.5, WBC count 2.58, hemoglobin 12.7, platelet count 96748, ANC 1.44.  05/27/2022:  Potassium 4.9, sodium 140, creatinine 0.87, , uric acid 6.6.  05/17/2022:  Creatinine 1.03, albumin 3.3, ,  WBC count 10.32, hemoglobin 12.4, platelet count 135 1000.   05/02/2022:  Creatinine 0.93, albumin 3.6, total protein 6.4, ,  potassium 5.2, WBC count 9.6, hemoglobin 11.4, .4, platelet count 92.   4/ Cr 0.89 Alb 3.4 TP 6.4 Ca 9.4  TSH 2.0 Iron 58n TIBC 285 Ferritin 48 Folate 9.9 B12 426 Hep B s ag neg Uric acid 7.8 Hep C ab neg WBC 9.79 RBC 3.77 Hg 11.8 .5 RDW 13.4 PLT 91 ANC 2.79 ALC 6.36 Direct Ramos neg b2 micro 7.5 Copper 135 Hapto 225 Hep B core ab neg SPEP w/ ARABELLA normal Abs kappa 32 Abs lambda 124 SFLC ratio 0.26  3/18/22 Cr 1 AST 27 ALT 12 AlkPhos 87 Alb 4.1 TP 6.5 Ca 9.8 WBC 9.4 RBC 3.92 Hg 12.2 MCV 96 RDW 15.1  ALC 5.8 ANC 3  5/15/18 WBC 14.71 RBC 4.42 Hg 14.3 MCV 98.6 RDW 13  ANC 2.91 ALC 10.13 AMC 1.29    Imagin2023:  PET-CT-interval resolution of moderate uptake in right axillary lymph node with no current PET evidence of metabolically active residual or recurrent lymphoma.    22 PET CT: Stable right axillary lymph node with mild-to-moderate uptake, representing residual metabolically active tumor. No new suspicious uptake noted     2022 PET-CT:  Excellent response to therapy since her last exam with interval resolution or near complete resolution of multiple hypermetabolic lymph nodes above and below the diaphragm.  There is residual mild-to-moderate uptake in the right axillary lymph node, Deauville score 4  Interval resolution of diffuse uptake in spleen which is also decreased in size.  No new suspicious uptake.     05/10/22:-  right lower extremity Doppler negative for DVT       22 PET CT: multiple hypermetabolic LNs b/l neck. Right submandibular LN .7x1.7cm, SUV 7.8. Left submandibular LN 2.3x1.7cm SUV 7.2. Multiple hypermetabolic LNs in chest. Right axillary node 3.3x2cm SUV 8.1. Hypermetabolic left axillary LN SUV 6.4, 1.9x3.2cm. Hypermetabolic right paratracheal LN 2.4x1.6cm SUV 4.9. Right pulmonary hilum SUV 2.7. Subcarinal LN SUV 3.3, 1.2x3.3cm. Spleen enlarged at 15cm and demonstrates heterogeneous mild to moderate uptake SUV 5.2. Moderately  intense activity in stomach and proximal duodenum with no degree of focality or discrete mass on CT. Multiple hypermetabolic LNs in abd and pelvis. Left periaortic LN SUV 6.8, 4x3.5cm. Large hypermetabolic mesenteric mass [id number] SUV 8.5. Aortocaval LN 3.5x3.8cm SUV 5.1. Hypermetabolic mass in left pelvis 5.5x7cm SUV 7. Left external iliac LN SUV 6 2.2x2.1cm. Extensive hypermetabolism anteriorly and laterally in left abdomen, associated with soft tissue thickening that is difficult to separate from bowel loops. B/l inguinal LNs up to 2.9x3.9cm, SUV 6.6    3/29/22 CT neck w/ and w/o contrast: widespread adenopathy identified in parotic glands bilateral, submandibular space, anterior and posterior cervical chains, superior mediastinum and supraclavicular and infraclavicular spaces of the base of neck/upper chest in appearance strongly suspicious for lymphoma. Multiple subcutaneous masses are also identified in lower occipital scalp and upper neck similar to lymphadenopathy. Biopsy of prominent subcutaneous mass in right upper neck is recommended and should present the easiest location for tissue sampling    Path:  5/23/18 peripheral blood flow cytometry: CD5+ B cell population. No abnormal T cell or myeloid cell populations. CD19+, CD20+, CD5+, CD23+, CD11c+ surface kappa light chain+, CD38-, CD22+ (dim/mod), CD43+, FMC7-. Consistent with CLL     04/26/2022:  Bone marrow biopsy-marrow -trilineage hematopoiesis with 10-20% B-cell infiltrate, C5 positive B-cell population with immunophenotype typical for CLL.  CLL fish within normal limits, heavy chain mutation status cannot be determined.  6q, chromosome 12, chromosome 13, chromosome 11, chromosome 17 and t( 11;14) abnormalities not detected.     05/06/2022 left axillary lymph node core biopsy:  Mantle cell lymphoma with a subset consistent with CLL/SLL., CD5 positive monoclonal B-cell population is identified, approximately 91% of total cells, immuno phenotype  is nonspecific and may be seen in CLL, marginal zone lymphoma, or large cell lymphoma.  A 2nd small monoclonal B-cell population approximately 2% of the cells identified with an immunophenotype consistent with CLL.  New genomic consultation lymphoma cells positive for CD 20, CD 5, cyclin D1 and BCL2 with a TI 67 of 10%.  There is no significant staining for CD10, BCL6.  However workup consistent with diagnosis of mantle cell lymphoma.        Past Medical History:   Diagnosis Date    Cataract     Lymphoma        Past Surgical History:   Procedure Laterality Date    HERNIA REPAIR         Family History   Problem Relation Age of Onset    Kidney disease Mother     Colon cancer Father     Colon cancer Sister     Breast cancer Sister     Uterine cancer Sister        Social History     Socioeconomic History    Marital status:    Tobacco Use    Smoking status: Never    Smokeless tobacco: Never   Substance and Sexual Activity    Alcohol use: Not Currently    Drug use: Never       Current Outpatient Medications   Medication Sig Dispense Refill    allopurinoL (ZYLOPRIM) 300 MG tablet Take 1 tablet (300 mg total) by mouth once daily. 30 tablet 5    aspirin (ECOTRIN) 81 MG EC tablet Take 81 mg by mouth once daily.      carvediloL (COREG) 12.5 MG tablet Take 1 tablet by mouth 2 (two) times a day.      EScitalopram oxalate (LEXAPRO) 10 MG tablet Take 1 tablet by mouth once daily.      ezetimibe (ZETIA) 10 mg tablet Take 10 mg by mouth once daily.      ibrutinib (IMBRUVICA) 420 mg Tab Take 420 mg by mouth once daily. 30 tablet 3    ondansetron (ZOFRAN) 8 MG tablet Take 1 tablet (8 mg total) by mouth every 8 (eight) hours as needed for Nausea. 30 tablet 1    pregabalin (LYRICA) 50 MG capsule pregabalin 50 mg capsule   TAKE 1 CAPSULE BY MOUTH TWICE DAILY      rosuvastatin (CRESTOR) 20 MG tablet Take 20 mg by mouth once daily.      sulfamethoxazole-trimethoprim 800-160mg (BACTRIM DS) 800-160 mg Tab Take 1 tablet by mouth every  Mon, Wed, Fri. 30 tablet 3    traMADoL (ULTRAM) 50 mg tablet Take 1 tablet (50 mg total) by mouth every 6 (six) hours as needed for Pain. 30 tablet 0    valACYclovir (VALTREX) 500 MG tablet Take 1 tablet (500 mg total) by mouth 2 (two) times daily. 60 tablet 3     No current facility-administered medications for this visit.       Review of patient's allergies indicates:   Allergen Reactions    Pregabalin           ROS  CONSTITUTIONAL: no fevers, no chills,  No weight loss, no  fatigue, no weakness  HEMATOLOGIC: no abnormal bleeding, no abnormal bruising, no drenching night sweats  ONCOLOGIC: no new masses or lumps  HEENT: no vision loss, no tinnitus or hearing loss, no nose bleeding, no dysphagia, no odynophagia  CVS: no chest pain, no palpitations, no dyspnea on exertion  RESP: no shortness of breath, no hemoptysis, no cough  GI: no nausea, no vomiting, no diarrhea, no constipation, no melena, no hematochezia, no hematemesis, no abdominal pain, no increase in abdominal girth  : no dysuria, no hematuria, no hesitancy, no scrotal swelling, no discharge  INTEGUMENT: no rashes, no abnormal bruising, no nail pitting, no hyperpigmentation  NEURO: no falls, no memory loss, no paresthesias or dysesthesias, no urofecal incontinence or retention, no loss of strength on any extremity  MSK: no back pain, no new joint pain, no joint swelling  PSYCH: no suicidal or homicidal ideation, no depression, no insomnia, no anhedonia  ENDOCRINE: no heat or cold intolerance, no polyuria, no polydipsia          Physical exam     Vitals:    05/05/23 1029   BP: 138/76   Pulse: 67   Resp: 20   Temp: 97.6 °F (36.4 °C)       GEN: AAOx3, NAD  HEENT: PERRLA, EOMI, edentulous, no oral ulcers  NECK:  Supple, full range of motion  LYMPH:  No axillary, supra clavicle or cervical adenopathy appreciated on today's exam.  CVS: s1s2 RRR, no M/R/G, port in place in right chest wall without surrounding edema or erythema.  RESP: CTA b/l, no crackles, no  wheezes or rhonchi, well-healed herpes zoster rash.  ABD: soft, NT, ND, BS+, no hepatomegaly, mild splenomegaly,   EXT: no deformities, right lower extremity edema.  SKIN:  No rash,.  No evidence of petechia.  NEURO: normal mentation, strength 5/5 on all 4 extremities, no sensory deficits         ASSESSMENT:     # Mantle cell lymphoma, [place]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  Positive for CLL.  PET-CT with evidence of bulky lymphadenopathy, largest measuring 14 cm, spleen measuring 15 cm.  Discussed with patient and his family the diagnosis, natural history and treatment options.  Discussed the risk for TLS given bulky lymphadenopathy, perforation given lymphadenopathy around the bowel loops as well as declining ECOG given his advanced age.  Continuet PJP prophylaxis with Bactrim Monday Wednesday Friday and valacyclovir with treatment.  Continue allopurinol 300 mg q.day for TLS prophylaxis.  Patient is status post cycle 1 of bendamustine and rituximab on 05/18/2022.  Patient has bendamustine was reduced by 25% and rituximab was given 1 week apart to reduce the risk of perforation.  S/p cycle 2 and cycle 3 without significant side effects, dosed for bendamustine increased to 100%.  Discussed with patient interim PET-CT with excellent response to treatment however with residual hypermetabolism in right axillary lymph node with a Deauville score of 4.  Given his excellent tolerance of treatment, will proceed to complete 6 cycles of BR in the setting of above PET-CT report.  Discussed alternate treatment plan to proceed with ibrutinib given partial response.  Patient elected to proceed with another 2 cycles of BR with plans to repeat PET-CT after completion of treatment to assess treatment response.  Given incomplete response on PET-CT after completion of 6 hours ND, he was started on ibrutinib in second-line  initially at 560 mg q.day.    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      Based on available information, likely SLL/CLL with some constitutional symptoms including weight loss, fatigue and night sweats. Adenopathy easily palpable above and below the diaphragm. Mild thrombocytopenia w/ PLT count ~120k and very mild anemia with Hg ~12 g/dl. No hypercalcemia, no evidence of immune paresis  Biopsies 4/11/22 with Dr Brannon were FNAs and non diagnostic for CLL. I do not know what IHC was obtained or whether pathologist knew what the potential diagnosis was as report only says negative for carcinoma. Will discuss with Dr Lan to add CD5, CD10, CD19, CD20 etc if enough tissue  Regardless, will also refer for axillary LN biopsy, either excisional or at the very least core. Referral sent today  PET CT 4/2022 findings are noted. Given his advanced age he was not refer for EGD. Masses in abdomen are likely matted gurinder conglomerates and I do not think it would be worth it to attempt a biopsy of those  CLL FISH  Negative , IGHV mutation status  Hep B and C negative  Allopurinol 300mg daily with PO hydration prior to initiation of any therapy  Given concomitant diagnosis of CLL and mantle cell lymphoma will plan to initiate treatment for mantle cell lymphoma and CLL with bendamustine rituximab.  Will consider BTK inhibitors if patient is intolerant to bendamustine/rituximab in the second-line      Plan:  Patient has completely healed from his recent episode of herpes zoster.    Reviewed labs, will starting back on ibrutinib 420 mg q.day   He will continue prophylaxis with acyclovir and Bactrim.  Follow-up in 4 weeks with repeat labs to assess treatment tolerance    A total of  30 minutes were spent in review of records, interpretation of  test, coordination of care, discussion and counseling with the patient.         Portions of the record may have been created with voice recognition software. Occasional wrong-word or sound-a-like substitutions may have occurred due to the inherent limitations of voice recognition software. Read the chart carefully and recognize, using context, where substitutions have occurred.

## 2023-05-31 ENCOUNTER — OFFICE VISIT (OUTPATIENT)
Dept: HEMATOLOGY/ONCOLOGY | Facility: CLINIC | Age: 83
End: 2023-05-31
Payer: MEDICARE

## 2023-05-31 VITALS
RESPIRATION RATE: 18 BRPM | DIASTOLIC BLOOD PRESSURE: 70 MMHG | SYSTOLIC BLOOD PRESSURE: 120 MMHG | TEMPERATURE: 98 F | HEART RATE: 61 BPM | OXYGEN SATURATION: 99 % | WEIGHT: 180.5 LBS | HEIGHT: 64 IN | BODY MASS INDEX: 30.81 KG/M2

## 2023-05-31 DIAGNOSIS — C83.13 MANTLE CELL LYMPHOMA OF INTRA-ABDOMINAL LYMPH NODES: Primary | ICD-10-CM

## 2023-05-31 PROCEDURE — 99214 OFFICE O/P EST MOD 30 MIN: CPT | Mod: ,,,

## 2023-05-31 PROCEDURE — 99214 PR OFFICE/OUTPT VISIT, EST, LEVL IV, 30-39 MIN: ICD-10-PCS | Mod: ,,,

## 2023-05-31 NOTE — PROGRESS NOTES
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     Treatment history    05/18/2022-10/26/2022:  6 cycles of bendamustine rituximab  December 2022:  Ibrutinib 560 mg q.day  03/16/2023-current:  Ibrutinib 420 mg q.d.      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 He also had a single visit with Dr Snow in 2018 but never followed back.   Patient had a PET-CT as well as a bone marrow biopsy and repeat peripheral flow cytometry with a diagnosis of CLL/SLL and mantle cell lymphoma.  Patient had symptoms of fatigue and weight loss prior to his workup and diagnosis.  He was diagnosed with mantle cell lymphoma and CLL and started on treatment with bendamustine rituximab, with incomplete response following which he was started on ibrutinib.      Interval history     Today, 05/31/2023, patient denies any acute concerns since last follow-up visit with us.  He denies abnormal bruising/bleeding issues. He denies any fevers, chills, drenching night sweats, decreased appetite, weight loss.  He reports mild discomfort around his shingles rash but denies any obvious new lesions.    Labs:  05/31/23: wbc 3.69, hgb 12.9, plt 4,000, ANC 2.58  05/26/23: cr 1.11, alb 3.6, ca 9.3, LFTs WNL, , uric acid 6.7, mag 2.1, phosphorus 2.7  05/03/2023:  Creatinine 0.86, albumin 3.6, calcium 9.3, alkaline phosphatase 61, total bili 0.5, AST 19, ALT 16, magnesium 2.2, phosphorus 3.2, , uric acid 6.5, WBC count 2.6, hemoglobin 12.7, .3, platelet count 124, ANC 1.69.  04/12/2023:  Creatinine 0.83, albumin 3.5, calcium 9.2, , uric acid 3.8, LFTs within normal limits, magnesium 2.0, phosphorus  2.6, WBC count 2.7, hemoglobin 12.6, ANC 2000, ALC 0.28.  03/28/23: CMP unremarkable except ca 10.2, mag 2.2, phosphorus 3.0, , uric acid 4.2, wbc 3.43, hgb 13.3, plt 78, ANC 2.48  03/10/23: CMP unremarkable, wbc 2.57, hgb 12.8, plt 87, ANC 1.85  03/01/23: cr 0.96, ca 9.2, alb 3.4, AST 35, ALT 24, , mag 1.9, phosphorus 3.4, uric acid 3.8, wbc 3.23, hgb 12.5, plt 74,000, ANC 2.39  02/01/23: Cr 0.92, ca 9.6, alb 3.6, LFTs WNL, wbc 3.08, hgb 12.6, plt 102, ANC 2.15, ALC 0.25  01/20/23; cr 0.97, alb 3.5, LFTs WNL, wbc 3.06, hgb 12.8, plt 64, ANC 2.25  11/22/22: cr 0.86, alb 3.7, LFTs WNL, , uric acid 5.1, wbc 2.16, hgb 12.8, plt 76, mcv 107.8, ALC 0.35, ANC 1.11  10/25/2022:  Creatinine 0.89, albumin 3.5, LFTs within normal limits, , uric acid 3.6.  WBC count 3.15, hemoglobin 12.7, .3, platelet count 125 1000, ANC 2.28.  10/11/2022:  Creatinine 1.04, albumin 3.6, LFTs within normal limits, magnesium 2.0, phosphorus 2.7, , uric acid 3.2, WBC 2.4, hemoglobin 13.4, .5, platelet count 82840.  ANC 1.77.  09/13/2022:  Creatinine 0.80, albumin 3.7, LFTs within normal limits, , uric acid 4.0, WBC count 2.37, hemoglobin 13.2, , platelet count 44264, ANC 1.45.  08/16/2022:  Creatinine 0.81, albumin 3.8, calcium 9.2, total bili 0.5, LFTs within normal limits, uric acid 3.3, , WBC 3.45, hemoglobin 13.7, .8, platelet count 51102, ANC 2.06.  07/19/2022:  WBC 3.75, hemoglobin 13.1, .5, platelet count 58868, ANC 2.32, uric acid 4.1, , creatinine 1.09, albumin 3.5, calcium 9.6, LFTs within normal limits.  06/21/2022 WBC at 2.58 bed neutrophil adequate at 55%.  Hemoglobin 12.7, hematocrit 39.4.  06/14/2022:  Creatinine 0.99, calcium 9.1, , uric acid 3.7, phosphorus 3.3, potassium 4.5, WBC count 2.58, hemoglobin 12.7, platelet count 35635, ANC 1.44.  05/27/2022:  Potassium 4.9, sodium 140, creatinine 0.87, , uric acid 6.6.  05/17/2022:   Creatinine 1.03, albumin 3.3, ,  WBC count 10.32, hemoglobin 12.4, platelet count 135 1000.   2022:  Creatinine 0.93, albumin 3.6, total protein 6.4, , potassium 5.2, WBC count 9.6, hemoglobin 11.4, .4, platelet count 92.   4/ Cr 0.89 Alb 3.4 TP 6.4 Ca 9.4  TSH 2.0 Iron 58n TIBC 285 Ferritin 48 Folate 9.9 B12 426 Hep B s ag neg Uric acid 7.8 Hep C ab neg WBC 9.79 RBC 3.77 Hg 11.8 .5 RDW 13.4 PLT 91 ANC 2.79 ALC 6.36 Direct Ramos neg b2 micro 7.5 Copper 135 Hapto 225 Hep B core ab neg SPEP w/ ARABELLA normal Abs kappa 32 Abs lambda 124 SFLC ratio 0.26  3/18/22 Cr 1 AST 27 ALT 12 AlkPhos 87 Alb 4.1 TP 6.5 Ca 9.8 WBC 9.4 RBC 3.92 Hg 12.2 MCV 96 RDW 15.1  ALC 5.8 ANC 3  5/15/18 WBC 14.71 RBC 4.42 Hg 14.3 MCV 98.6 RDW 13  ANC 2.91 ALC 10.13 AMC 1.29    Imagin2023:  PET-CT-interval resolution of moderate uptake in right axillary lymph node with no current PET evidence of metabolically active residual or recurrent lymphoma.    22 PET CT: Stable right axillary lymph node with mild-to-moderate uptake, representing residual metabolically active tumor. No new suspicious uptake noted     2022 PET-CT:  Excellent response to therapy since her last exam with interval resolution or near complete resolution of multiple hypermetabolic lymph nodes above and below the diaphragm.  There is residual mild-to-moderate uptake in the right axillary lymph node, Deauville score 4  Interval resolution of diffuse uptake in spleen which is also decreased in size.  No new suspicious uptake.     05/10/22:-  right lower extremity Doppler negative for DVT       22 PET CT: multiple hypermetabolic LNs b/l neck. Right submandibular LN .7x1.7cm, SUV 7.8. Left submandibular LN 2.3x1.7cm SUV 7.2. Multiple hypermetabolic LNs in chest. Right axillary node 3.3x2cm SUV 8.1. Hypermetabolic left axillary LN SUV 6.4, 1.9x3.2cm. Hypermetabolic right paratracheal LN 2.4x1.6cm SUV  4.9. Right pulmonary hilum SUV 2.7. Subcarinal LN SUV 3.3, 1.2x3.3cm. Spleen enlarged at 15cm and demonstrates heterogeneous mild to moderate uptake SUV 5.2. Moderately intense activity in stomach and proximal duodenum with no degree of focality or discrete mass on CT. Multiple hypermetabolic LNs in abd and pelvis. Left periaortic LN SUV 6.8, 4x3.5cm. Large hypermetabolic mesenteric mass [id number] SUV 8.5. Aortocaval LN 3.5x3.8cm SUV 5.1. Hypermetabolic mass in left pelvis 5.5x7cm SUV 7. Left external iliac LN SUV 6 2.2x2.1cm. Extensive hypermetabolism anteriorly and laterally in left abdomen, associated with soft tissue thickening that is difficult to separate from bowel loops. B/l inguinal LNs up to 2.9x3.9cm, SUV 6.6    3/29/22 CT neck w/ and w/o contrast: widespread adenopathy identified in parotic glands bilateral, submandibular space, anterior and posterior cervical chains, superior mediastinum and supraclavicular and infraclavicular spaces of the base of neck/upper chest in appearance strongly suspicious for lymphoma. Multiple subcutaneous masses are also identified in lower occipital scalp and upper neck similar to lymphadenopathy. Biopsy of prominent subcutaneous mass in right upper neck is recommended and should present the easiest location for tissue sampling    Path:  5/23/18 peripheral blood flow cytometry: CD5+ B cell population. No abnormal T cell or myeloid cell populations. CD19+, CD20+, CD5+, CD23+, CD11c+ surface kappa light chain+, CD38-, CD22+ (dim/mod), CD43+, FMC7-. Consistent with CLL     04/26/2022:  Bone marrow biopsy-marrow -trilineage hematopoiesis with 10-20% B-cell infiltrate, C5 positive B-cell population with immunophenotype typical for CLL.  CLL fish within normal limits, heavy chain mutation status cannot be determined.  6q, chromosome 12, chromosome 13, chromosome 11, chromosome 17 and t( 11;14) abnormalities not detected.     05/06/2022 left axillary lymph node core biopsy:   Mantle cell lymphoma with a subset consistent with CLL/SLL., CD5 positive monoclonal B-cell population is identified, approximately 91% of total cells, immuno phenotype is nonspecific and may be seen in CLL, marginal zone lymphoma, or large cell lymphoma.  A 2nd small monoclonal B-cell population approximately 2% of the cells identified with an immunophenotype consistent with CLL.  New genomic consultation lymphoma cells positive for CD 20, CD 5, cyclin D1 and BCL2 with a TI 67 of 10%.  There is no significant staining for CD10, BCL6.  However workup consistent with diagnosis of mantle cell lymphoma.        Past Medical History:   Diagnosis Date    Cataract     Lymphoma        Past Surgical History:   Procedure Laterality Date    HERNIA REPAIR         Family History   Problem Relation Age of Onset    Kidney disease Mother     Colon cancer Father     Colon cancer Sister     Breast cancer Sister     Uterine cancer Sister        Social History     Socioeconomic History    Marital status:    Tobacco Use    Smoking status: Never    Smokeless tobacco: Never   Substance and Sexual Activity    Alcohol use: Not Currently    Drug use: Never       Current Outpatient Medications   Medication Sig Dispense Refill    allopurinoL (ZYLOPRIM) 300 MG tablet Take 1 tablet (300 mg total) by mouth once daily. 30 tablet 5    aspirin (ECOTRIN) 81 MG EC tablet Take 81 mg by mouth once daily.      carvediloL (COREG) 12.5 MG tablet Take 1 tablet by mouth 2 (two) times a day.      EScitalopram oxalate (LEXAPRO) 10 MG tablet Take 1 tablet by mouth once daily.      ezetimibe (ZETIA) 10 mg tablet Take 10 mg by mouth once daily.      ibrutinib (IMBRUVICA) 420 mg Tab Take 420 mg by mouth once daily. 30 tablet 3    ondansetron (ZOFRAN) 8 MG tablet Take 1 tablet (8 mg total) by mouth every 8 (eight) hours as needed for Nausea. 30 tablet 1    pregabalin (LYRICA) 50 MG capsule pregabalin 50 mg capsule   TAKE 1 CAPSULE BY MOUTH TWICE DAILY       rosuvastatin (CRESTOR) 20 MG tablet Take 20 mg by mouth once daily.      sulfamethoxazole-trimethoprim 800-160mg (BACTRIM DS) 800-160 mg Tab Take 1 tablet by mouth every Mon, Wed, Fri. 30 tablet 3    traMADoL (ULTRAM) 50 mg tablet Take 1 tablet (50 mg total) by mouth every 6 (six) hours as needed for Pain. 30 tablet 0    valACYclovir (VALTREX) 500 MG tablet Take 1 tablet (500 mg total) by mouth 2 (two) times daily. 60 tablet 3     No current facility-administered medications for this visit.       Review of patient's allergies indicates:   Allergen Reactions    Pregabalin           ROS  CONSTITUTIONAL: no fevers, no chills,  No weight loss, no  fatigue, no weakness  HEMATOLOGIC: no abnormal bleeding, no abnormal bruising, no drenching night sweats  ONCOLOGIC: no new masses or lumps  HEENT: no vision loss, no tinnitus or hearing loss, no nose bleeding, no dysphagia, no odynophagia  CVS: no chest pain, no palpitations, no dyspnea on exertion  RESP: no shortness of breath, no hemoptysis, no cough  GI: no nausea, no vomiting, no diarrhea, no constipation, no melena, no hematochezia, no hematemesis, no abdominal pain, no increase in abdominal girth  : no dysuria, no hematuria, no hesitancy, no scrotal swelling, no discharge  INTEGUMENT: no rashes, no abnormal bruising, no nail pitting, no hyperpigmentation  NEURO: no falls, no memory loss, no paresthesias or dysesthesias, no urofecal incontinence or retention, no loss of strength on any extremity  MSK: no back pain, no new joint pain, no joint swelling  PSYCH: no suicidal or homicidal ideation, no depression, no insomnia, no anhedonia  ENDOCRINE: no heat or cold intolerance, no polyuria, no polydipsia          Physical exam     Vitals:    05/31/23 1024   BP: 120/70   Pulse: 61   Resp: 18   Temp: 97.6 °F (36.4 °C)       GEN: AAOx3, NAD  HEENT: PERRLA, EOMI, edentulous, no oral ulcers  NECK:  Supple, full range of motion  LYMPH:  No axillary, supra clavicle or cervical  adenopathy appreciated on today's exam.  CVS: s1s2 RRR, no M/R/G, port in place in right chest wall without surrounding edema or erythema.  RESP: CTA b/l, no crackles, no wheezes or rhonchi, well-healed herpes zoster rash.  ABD: soft, NT, ND, BS+, no hepatomegaly, mild splenomegaly,   EXT: no deformities, right lower extremity edema.  SKIN:  No rash,.  No evidence of petechia.  NEURO: normal mentation, strength 5/5 on all 4 extremities, no sensory deficits         ASSESSMENT:     # Mantle cell lymphoma, Tasley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  Positive for CLL.  PET-CT with evidence of bulky lymphadenopathy, largest measuring 14 cm, spleen measuring 15 cm.  Discussed with patient and his family the diagnosis, natural history and treatment options.  Discussed the risk for TLS given bulky lymphadenopathy, perforation given lymphadenopathy around the bowel loops as well as declining ECOG given his advanced age.  Continuet PJP prophylaxis with Bactrim Monday Wednesday Friday and valacyclovir with treatment.  Continue allopurinol 300 mg q.day for TLS prophylaxis.  Patient is status post cycle 1 of bendamustine and rituximab on 05/18/2022.  Patient has bendamustine was reduced by 25% and rituximab was given 1 week apart to reduce the risk of perforation.  S/p cycle 2 and cycle 3 without significant side effects, dosed for bendamustine increased to 100%.  Discussed with patient interim PET-CT with excellent response to treatment however with residual hypermetabolism in right axillary lymph node with a Deauville score of 4.  Given his excellent tolerance of treatment, will proceed to complete 6 cycles of BR in the setting of above PET-CT report.  Discussed alternate treatment plan to proceed with ibrutinib given partial response.  Patient elected to proceed with another 2 cycles of BR with plans to repeat PET-CT  after completion of treatment to assess treatment response.  Given incomplete response on PET-CT after completion of 6 hours ID, he was started on ibrutinib in second-line initially at 560 mg q.day.    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      Based on available information, likely SLL/CLL with some constitutional symptoms including weight loss, fatigue and night sweats. Adenopathy easily palpable above and below the diaphragm. Mild thrombocytopenia w/ PLT count ~120k and very mild anemia with Hg ~12 g/dl. No hypercalcemia, no evidence of immune paresis  Biopsies 4/11/22 with Dr Brannon were FNAs and non diagnostic for CLL. I do not know what IHC was obtained or whether pathologist knew what the potential diagnosis was as report only says negative for carcinoma. Will discuss with Dr Lan to add CD5, CD10, CD19, CD20 etc if enough tissue  Regardless, will also refer for axillary LN biopsy, either excisional or at the very least core. Referral sent today  PET CT 4/2022 findings are noted. Given his advanced age he was not refer for EGD. Masses in abdomen are likely matted gurinder conglomerates and I do not think it would be worth it to attempt a biopsy of those  CLL FISH  Negative , IGHV mutation status  Hep B and C negative  Allopurinol 300mg daily with PO hydration prior to initiation of any therapy  Given concomitant diagnosis of CLL and mantle cell lymphoma will plan to initiate treatment for mantle cell lymphoma and CLL with bendamustine rituximab.  Will consider BTK inhibitors if patient is intolerant to bendamustine/rituximab in the second-line      Plan:  Reviewed labs, will hold ibrutinib 420 mg q.day for 1 week due to platelets 74,000  Continue allopurinol  He will continue prophylaxis with acyclovir and  Bactrim  Telephone visit 1 week to assess platelet level and to possible resume ibrutinib 420 mg daily  CBC CMP magnesium phosphorus 1 week, (LDH and uric acid prior to 4 weeks f/u)  If labs appropriate to resume ibrutinib upon return, we will resume q 4 week visits with repeat labs to assess treatment tolerance    A total of  30 minutes were spent in review of records, interpretation of test, coordination of care, discussion and counseling with the patient.

## 2023-06-08 ENCOUNTER — TELEPHONE (OUTPATIENT)
Dept: HEMATOLOGY/ONCOLOGY | Facility: CLINIC | Age: 83
End: 2023-06-08

## 2023-06-08 NOTE — TELEPHONE ENCOUNTER
Per Monika Womack, NP Pt's wife notified to hold Ibrutinib for one week and repeat labs on 6/14 with audio appt on 6/15 at 11am..Verbalizes understanding. [facility] [place] [facility].--SC, LPN

## 2023-06-15 ENCOUNTER — OFFICE VISIT (OUTPATIENT)
Dept: HEMATOLOGY/ONCOLOGY | Facility: CLINIC | Age: 83
End: 2023-06-15
Payer: MEDICARE

## 2023-06-15 DIAGNOSIS — Z51.11 ENCOUNTER FOR ANTINEOPLASTIC CHEMOTHERAPY: ICD-10-CM

## 2023-06-15 DIAGNOSIS — C83.13 MANTLE CELL LYMPHOMA OF INTRA-ABDOMINAL LYMPH NODES: Primary | ICD-10-CM

## 2023-06-15 PROCEDURE — 99442 PR PHYSICIAN TELEPHONE EVALUATION 11-20 MIN: CPT | Mod: 95,,, | Performed by: NURSE PRACTITIONER

## 2023-06-15 PROCEDURE — 99442 PR PHYSICIAN TELEPHONE EVALUATION 11-20 MIN: ICD-10-PCS | Mod: 95,,, | Performed by: NURSE PRACTITIONER

## 2023-06-15 NOTE — PROGRESS NOTES
Established Patient - Audio Only Telehealth Visit     The patient location is: home  The chief complaint leading to consultation is: CLL/SLL  Visit type: Virtual visit with audio only (telephone)  Total time spent with patient: 13       The reason for the audio only service rather than synchronous audio and video virtual visit was related to technical difficulties or patient preference/necessity.     Each patient to whom I provide medical services by telemedicine is:  (1) informed of the relationship between the physician and patient and the respective role of any other health care provider with respect to management of the patient; and (2) notified that they may decline to receive medical services by telemedicine and may withdraw from such care at any time. Patient verbally consented to receive this service via voice-only telephone call.      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     Treatment history    05/18/2022-10/26/2022:  6 cycles of bendamustine rituximab  December 2022:  Ibrutinib 560 mg q.day  03/16/2023-current:  Ibrutinib 420 mg q.d.      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 He also had a single visit with Dr Snow in 2018 but never followed back.   Patient had a PET-CT as well as a bone marrow biopsy and repeat peripheral flow cytometry with a diagnosis of CLL/SLL and mantle cell lymphoma.  Patient had symptoms of fatigue and weight loss prior to his workup and diagnosis.  He was diagnosed with mantle cell lymphoma and CLL and started on treatment with bendamustine rituximab, with incomplete response following which he was started on ibrutinib.      Interval  history     Today, 06/15/2023,patient presents via audio for visit. He has been off his Ibrutinib since 5/31/23 due to thrombocytopenia.  Patient denies any acute concerns since last follow-up visit with us.  He denies abnormal bruising/bleeding issues. He denies any fevers, chills, drenching night sweats, decreased appetite, weight loss. He states that he feels good    Labs:  06/14/23:  wbc 3.26, hgb 13.9, plt 102, ANC 2.21 Cr 1.14 total bili 0.4 AST 26 ALT 23   05/31/23: wbc 3.69, hgb 12.9, plt 4,000, ANC 2.58  05/26/23: cr 1.11, alb 3.6, ca 9.3, LFTs WNL, , uric acid 6.7, mag 2.1, phosphorus 2.7  05/03/2023:  Creatinine 0.86, albumin 3.6, calcium 9.3, alkaline phosphatase 61, total bili 0.5, AST 19, ALT 16, magnesium 2.2, phosphorus 3.2, , uric acid 6.5, WBC count 2.6, hemoglobin 12.7, .3, platelet count 124, ANC 1.69.  04/12/2023:  Creatinine 0.83, albumin 3.5, calcium 9.2, , uric acid 3.8, LFTs within normal limits, magnesium 2.0, phosphorus 2.6, WBC count 2.7, hemoglobin 12.6, ANC 2000, ALC 0.28.  03/28/23: CMP unremarkable except ca 10.2, mag 2.2, phosphorus 3.0, , uric acid 4.2, wbc 3.43, hgb 13.3, plt 78, ANC 2.48  03/10/23: CMP unremarkable, wbc 2.57, hgb 12.8, plt 87, ANC 1.85  03/01/23: cr 0.96, ca 9.2, alb 3.4, AST 35, ALT 24, , mag 1.9, phosphorus 3.4, uric acid 3.8, wbc 3.23, hgb 12.5, plt 74,000, ANC 2.39  02/01/23: Cr 0.92, ca 9.6, alb 3.6, LFTs WNL, wbc 3.08, hgb 12.6, plt 102, ANC 2.15, ALC 0.25  01/20/23; cr 0.97, alb 3.5, LFTs WNL, wbc 3.06, hgb 12.8, plt 64, ANC 2.25  11/22/22: cr 0.86, alb 3.7, LFTs WNL, , uric acid 5.1, wbc 2.16, hgb 12.8, plt 76, mcv 107.8, ALC 0.35, ANC 1.11  10/25/2022:  Creatinine 0.89, albumin 3.5, LFTs within normal limits, , uric acid 3.6.  WBC count 3.15, hemoglobin 12.7, .3, platelet count 125 1000, ANC 2.28.  10/11/2022:  Creatinine 1.04, albumin 3.6, LFTs within normal limits, magnesium 2.0, phosphorus  2.7, , uric acid 3.2, WBC 2.4, hemoglobin 13.4, .5, platelet count 22716.  ANC 1.77.  2022:  Creatinine 0.80, albumin 3.7, LFTs within normal limits, , uric acid 4.0, WBC count 2.37, hemoglobin 13.2, , platelet count 88059, ANC 1.45.  2022:  Creatinine 0.81, albumin 3.8, calcium 9.2, total bili 0.5, LFTs within normal limits, uric acid 3.3, , WBC 3.45, hemoglobin 13.7, .8, platelet count 11005, ANC 2.06.  2022:  WBC 3.75, hemoglobin 13.1, .5, platelet count 51203, ANC 2.32, uric acid 4.1, , creatinine 1.09, albumin 3.5, calcium 9.6, LFTs within normal limits.  2022 WBC at 2.58 bed neutrophil adequate at 55%.  Hemoglobin 12.7, hematocrit 39.4.  2022:  Creatinine 0.99, calcium 9.1, , uric acid 3.7, phosphorus 3.3, potassium 4.5, WBC count 2.58, hemoglobin 12.7, platelet count 64041, ANC 1.44.  2022:  Potassium 4.9, sodium 140, creatinine 0.87, , uric acid 6.6.  2022:  Creatinine 1.03, albumin 3.3, ,  WBC count 10.32, hemoglobin 12.4, platelet count 135 1000.   2022:  Creatinine 0.93, albumin 3.6, total protein 6.4, , potassium 5.2, WBC count 9.6, hemoglobin 11.4, .4, platelet count 92.   4/ Cr 0.89 Alb 3.4 TP 6.4 Ca 9.4  TSH 2.0 Iron 58n TIBC 285 Ferritin 48 Folate 9.9 B12 426 Hep B s ag neg Uric acid 7.8 Hep C ab neg WBC 9.79 RBC 3.77 Hg 11.8 .5 RDW 13.4 PLT 91 ANC 2.79 ALC 6.36 Direct Ramos neg b2 micro 7.5 Copper 135 Hapto 225 Hep B core ab neg SPEP w/ ARABELLA normal Abs kappa 32 Abs lambda 124 SFLC ratio 0.26  3/18/22 Cr 1 AST 27 ALT 12 AlkPhos 87 Alb 4.1 TP 6.5 Ca 9.8 WBC 9.4 RBC 3.92 Hg 12.2 MCV 96 RDW 15.1  ALC 5.8 ANC 3  5/15/18 WBC 14.71 RBC 4.42 Hg 14.3 MCV 98.6 RDW 13  ANC 2.91 ALC 10.13 AMC 1.29    Imagin2023:  PET-CT-interval resolution of moderate uptake in right axillary lymph node with no current PET evidence of  metabolically active residual or recurrent lymphoma.    11/7/22 PET CT: Stable right axillary lymph node with mild-to-moderate uptake, representing residual metabolically active tumor. No new suspicious uptake noted     08/25/2022 PET-CT:  Excellent response to therapy since her last exam with interval resolution or near complete resolution of multiple hypermetabolic lymph nodes above and below the diaphragm.  There is residual mild-to-moderate uptake in the right axillary lymph node, Deauville score 4  Interval resolution of diffuse uptake in spleen which is also decreased in size.  No new suspicious uptake.     05/10/22:-  right lower extremity Doppler negative for DVT       4/13/22 PET CT: multiple hypermetabolic LNs b/l neck. Right submandibular LN .7x1.7cm, SUV 7.8. Left submandibular LN 2.3x1.7cm SUV 7.2. Multiple hypermetabolic LNs in chest. Right axillary node 3.3x2cm SUV 8.1. Hypermetabolic left axillary LN SUV 6.4, 1.9x3.2cm. Hypermetabolic right paratracheal LN 2.4x1.6cm SUV 4.9. Right pulmonary hilum SUV 2.7. Subcarinal LN SUV 3.3, 1.2x3.3cm. Spleen enlarged at 15cm and demonstrates heterogeneous mild to moderate uptake SUV 5.2. Moderately intense activity in stomach and proximal duodenum with no degree of focality or discrete mass on CT. Multiple hypermetabolic LNs in abd and pelvis. Left periaortic LN SUV 6.8, 4x3.5cm. Large hypermetabolic mesenteric mass 4l1m54by SUV 8.5. Aortocaval LN 3.5x3.8cm SUV 5.1. Hypermetabolic mass in left pelvis 5.5x7cm SUV 7. Left external iliac LN SUV 6 2.2x2.1cm. Extensive hypermetabolism anteriorly and laterally in left abdomen, associated with soft tissue thickening that is difficult to separate from bowel loops. B/l inguinal LNs up to 2.9x3.9cm, SUV 6.6    3/29/22 CT neck w/ and w/o contrast: widespread adenopathy identified in parotic glands bilateral, submandibular space, anterior and posterior cervical chains, superior mediastinum and supraclavicular and  infraclavicular spaces of the base of neck/upper chest in appearance strongly suspicious for lymphoma. Multiple subcutaneous masses are also identified in lower occipital scalp and upper neck similar to lymphadenopathy. Biopsy of prominent subcutaneous mass in right upper neck is recommended and should present the easiest location for tissue sampling    Path:  5/23/18 peripheral blood flow cytometry: CD5+ B cell population. No abnormal T cell or myeloid cell populations. CD19+, CD20+, CD5+, CD23+, CD11c+ surface kappa light chain+, CD38-, CD22+ (dim/mod), CD43+, FMC7-. Consistent with CLL     04/26/2022:  Bone marrow biopsy-marrow -trilineage hematopoiesis with 10-20% B-cell infiltrate, C5 positive B-cell population with immunophenotype typical for CLL.  CLL fish within normal limits, heavy chain mutation status cannot be determined.  6q, chromosome 12, chromosome 13, chromosome 11, chromosome 17 and t( 11;14) abnormalities not detected.     05/06/2022 left axillary lymph node core biopsy:  Mantle cell lymphoma with a subset consistent with CLL/SLL., CD5 positive monoclonal B-cell population is identified, approximately 91% of total cells, immuno phenotype is nonspecific and may be seen in CLL, marginal zone lymphoma, or large cell lymphoma.  A 2nd small monoclonal B-cell population approximately 2% of the cells identified with an immunophenotype consistent with CLL.  New genomic consultation lymphoma cells positive for CD 20, CD 5, cyclin D1 and BCL2 with a TI 67 of 10%.  There is no significant staining for CD10, BCL6.  However workup consistent with diagnosis of mantle cell lymphoma.        Past Medical History:   Diagnosis Date    Cataract     Lymphoma        Past Surgical History:   Procedure Laterality Date    HERNIA REPAIR         Family History   Problem Relation Age of Onset    Kidney disease Mother     Colon cancer Father     Colon cancer Sister     Breast cancer Sister     Uterine cancer Sister         Social History     Socioeconomic History    Marital status:    Tobacco Use    Smoking status: Never    Smokeless tobacco: Never   Substance and Sexual Activity    Alcohol use: Not Currently    Drug use: Never       Current Outpatient Medications   Medication Sig Dispense Refill    allopurinoL (ZYLOPRIM) 300 MG tablet Take 1 tablet (300 mg total) by mouth once daily. 30 tablet 5    aspirin (ECOTRIN) 81 MG EC tablet Take 81 mg by mouth once daily.      carvediloL (COREG) 12.5 MG tablet Take 1 tablet by mouth 2 (two) times a day.      ondansetron (ZOFRAN) 8 MG tablet Take 1 tablet (8 mg total) by mouth every 8 (eight) hours as needed for Nausea. 30 tablet 1    rosuvastatin (CRESTOR) 20 MG tablet Take 20 mg by mouth once daily.      sulfamethoxazole-trimethoprim 800-160mg (BACTRIM DS) 800-160 mg Tab Take 1 tablet by mouth every Mon, Wed, Fri. 30 tablet 3    traMADoL (ULTRAM) 50 mg tablet Take 1 tablet (50 mg total) by mouth every 6 (six) hours as needed for Pain. 30 tablet 0    EScitalopram oxalate (LEXAPRO) 10 MG tablet Take 1 tablet by mouth once daily.      ezetimibe (ZETIA) 10 mg tablet Take 10 mg by mouth once daily.      ibrutinib (IMBRUVICA) 420 mg Tab Take 420 mg by mouth once daily. (Patient not taking: Reported on 6/15/2023.) 30 tablet 3    pregabalin (LYRICA) 50 MG capsule pregabalin 50 mg capsule   TAKE 1 CAPSULE BY MOUTH TWICE DAILY      valACYclovir (VALTREX) 500 MG tablet Take 1 tablet (500 mg total) by mouth 2 (two) times daily. 60 tablet 3     No current facility-administered medications for this visit.       Review of patient's allergies indicates:   Allergen Reactions    Pregabalin           ROS  CONSTITUTIONAL: no fevers, no chills,  No weight loss, no  fatigue, no weakness  HEMATOLOGIC: no abnormal bleeding, no abnormal bruising, no drenching night sweats  ONCOLOGIC: no new masses or lumps  HEENT: no vision loss, no tinnitus or hearing loss, no nose bleeding, no dysphagia, no  odynophagia  CVS: no chest pain, no palpitations, no dyspnea on exertion  RESP: no shortness of breath, no hemoptysis, no cough  GI: no nausea, no vomiting, no diarrhea, no constipation, no melena, no hematochezia, no hematemesis, no abdominal pain, no increase in abdominal girth  : no dysuria, no hematuria, no hesitancy, no scrotal swelling, no discharge  INTEGUMENT: no rashes, no abnormal bruising, no nail pitting, no hyperpigmentation  NEURO: no falls, no memory loss, no paresthesias or dysesthesias, no urofecal incontinence or retention, no loss of strength on any extremity  MSK: no back pain, no new joint pain, no joint swelling  PSYCH: no suicidal or homicidal ideation, no depression, no insomnia, no anhedonia  ENDOCRINE: no heat or cold intolerance, no polyuria, no polydipsia          Physical exam     There were no vitals filed for this visit.      GEN: AAOx3, NAD  RESP: audible breathing normal patient able to speak without shortness of breath   NEURO: normal mentation,  no sensory deficits  Unable to assess all other systems due to telephone visit          ASSESSMENT:     # Mantle cell lymphoma, [place]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  Positive for CLL.  PET-CT with evidence of bulky lymphadenopathy, largest measuring 14 cm, spleen measuring 15 cm.  Discussed with patient and his family the diagnosis, natural history and treatment options.  Discussed the risk for TLS given bulky lymphadenopathy, perforation given lymphadenopathy around the bowel loops as well as declining ECOG given his advanced age.  Continuet PJP prophylaxis with Bactrim Monday Wednesday Friday and valacyclovir with treatment.  Continue allopurinol 300 mg q.day for TLS prophylaxis.  Patient is status post cycle 1 of bendamustine and rituximab on 05/18/2022.  Patient has bendamustine was reduced by 25% and rituximab was given 1  week apart to reduce the risk of perforation.  S/p cycle 2 and cycle 3 without significant side effects, dosed for bendamustine increased to 100%.  Discussed with patient interim PET-CT with excellent response to treatment however with residual hypermetabolism in right axillary lymph node with a Deauville score of 4.  Given his excellent tolerance of treatment, will proceed to complete 6 cycles of BR in the setting of above PET-CT report.  Discussed alternate treatment plan to proceed with ibrutinib given partial response.  Patient elected to proceed with another 2 cycles of BR with plans to repeat PET-CT after completion of treatment to assess treatment response.  Given incomplete response on PET-CT after completion of 6 hours RI, he was started on ibrutinib in second-line initially at 560 mg q.day.    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      Based on available information, likely SLL/CLL with some constitutional symptoms including weight loss, fatigue and night sweats. Adenopathy easily palpable above and below the diaphragm. Mild thrombocytopenia w/ PLT count ~120k and very mild anemia with Hg ~12 g/dl. No hypercalcemia, no evidence of immune paresis  Biopsies 4/11/22 with Dr Brannon were FNAs and non diagnostic for CLL. I do not know what IHC was obtained or whether pathologist knew what the potential diagnosis was as report only says negative for carcinoma. Will discuss with Dr Lan to add CD5, CD10, CD19, CD20 etc if enough tissue  Regardless, will also refer for axillary LN biopsy, either excisional or at the very least core. Referral sent today  PET CT 4/2022 findings are noted. Given his advanced age he was not refer for EGD. Masses in abdomen are likely matted gurinder conglomerates and I do not think it would be  worth it to attempt a biopsy of those  CLL FISH  Negative , IGHV mutation status  Hep B and C negative  Allopurinol 300mg daily with PO hydration prior to initiation of any therapy  Given concomitant diagnosis of CLL and mantle cell lymphoma will plan to initiate treatment for mantle cell lymphoma and CLL with bendamustine rituximab.  Will consider BTK inhibitors if patient is intolerant to bendamustine/rituximab in the second-line      Plan:  Reviewed labs, resume  ibrutinib 420 mg q.day   Plt 102 discussed bleeding precautions   Continue allopurinol  He will continue prophylaxis with acyclovir and Bactrim  CBC CMP magnesium phosphorus, B12 and folic acid level, path review, peripheral smear, TSH on return   RTC 4 week visits with repeat labs to assess treatment tolerance    Pt instructed to call in the interim for any new or worsening concerns or symptoms    KOFI Garcia          This service was not originating from a related E/M service provided within the previous 7 days nor will  to an E/M service or procedure within the next 24 hours or my soonest available appointment.  Prevailing standard of care was able to be met in this audio-only visit.

## 2023-07-18 ENCOUNTER — OFFICE VISIT (OUTPATIENT)
Dept: HEMATOLOGY/ONCOLOGY | Facility: CLINIC | Age: 83
End: 2023-07-18
Payer: MEDICARE

## 2023-07-18 VITALS
TEMPERATURE: 98 F | RESPIRATION RATE: 18 BRPM | HEART RATE: 65 BPM | OXYGEN SATURATION: 98 % | DIASTOLIC BLOOD PRESSURE: 68 MMHG | HEIGHT: 64 IN | SYSTOLIC BLOOD PRESSURE: 118 MMHG | BODY MASS INDEX: 30.42 KG/M2 | WEIGHT: 178.19 LBS

## 2023-07-18 DIAGNOSIS — T45.1X5A CHEMOTHERAPY ADVERSE REACTION, INITIAL ENCOUNTER: ICD-10-CM

## 2023-07-18 DIAGNOSIS — C83.13 MANTLE CELL LYMPHOMA OF INTRA-ABDOMINAL LYMPH NODES: Primary | ICD-10-CM

## 2023-07-18 DIAGNOSIS — Z51.11 ENCOUNTER FOR ANTINEOPLASTIC CHEMOTHERAPY: ICD-10-CM

## 2023-07-18 PROCEDURE — 99215 PR OFFICE/OUTPT VISIT, EST, LEVL V, 40-54 MIN: ICD-10-PCS | Mod: ,,, | Performed by: STUDENT IN AN ORGANIZED HEALTH CARE EDUCATION/TRAINING PROGRAM

## 2023-07-18 PROCEDURE — 99215 OFFICE O/P EST HI 40 MIN: CPT | Mod: ,,, | Performed by: STUDENT IN AN ORGANIZED HEALTH CARE EDUCATION/TRAINING PROGRAM

## 2023-07-18 NOTE — PROGRESS NOTES
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     Treatment history    05/18/2022-10/26/2022:  6 cycles of bendamustine rituximab  December 2022:  Ibrutinib 560 mg q.day  03/16/2023-current:  Ibrutinib 420 mg q.d.      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 He also had a single visit with Dr Snow in 2018 but never followed back.   Patient had a PET-CT as well as a bone marrow biopsy and repeat peripheral flow cytometry with a diagnosis of CLL/SLL and mantle cell lymphoma.  Patient had symptoms of fatigue and weight loss prior to his workup and diagnosis.  He was diagnosed with mantle cell lymphoma and CLL and started on treatment with bendamustine rituximab, with incomplete response following which he was started on ibrutinib.      Interval history     Today, 07/18/2023,    Labs:  07/10/23   06/14/23:  wbc 3.26, hgb 13.9, plt 102, ANC 2.21 Cr 1.14 total bili 0.4 AST 26 ALT 23   05/31/23: wbc 3.69, hgb 12.9, plt 4,000, ANC 2.58  05/26/23: cr 1.11, alb 3.6, ca 9.3, LFTs WNL, , uric acid 6.7, mag 2.1, phosphorus 2.7  05/03/2023:  Creatinine 0.86, albumin 3.6, calcium 9.3, alkaline phosphatase 61, total bili 0.5, AST 19, ALT 16, magnesium 2.2, phosphorus 3.2, , uric acid 6.5, WBC count 2.6, hemoglobin 12.7, .3, platelet count 124, ANC 1.69.  04/12/2023:  Creatinine 0.83, albumin 3.5, calcium 9.2, , uric acid 3.8, LFTs within normal limits, magnesium 2.0, phosphorus 2.6, WBC count 2.7, hemoglobin 12.6, ANC 2000, ALC 0.28.  03/28/23: CMP unremarkable except ca 10.2, mag 2.2, phosphorus 3.0, , uric acid 4.2, wbc 3.43, hgb 13.3, plt 78, ANC  2.48  03/10/23: CMP unremarkable, wbc 2.57, hgb 12.8, plt 87, ANC 1.85  03/01/23: cr 0.96, ca 9.2, alb 3.4, AST 35, ALT 24, , mag 1.9, phosphorus 3.4, uric acid 3.8, wbc 3.23, hgb 12.5, plt 74,000, ANC 2.39  02/01/23: Cr 0.92, ca 9.6, alb 3.6, LFTs WNL, wbc 3.08, hgb 12.6, plt 102, ANC 2.15, ALC 0.25  01/20/23; cr 0.97, alb 3.5, LFTs WNL, wbc 3.06, hgb 12.8, plt 64, ANC 2.25  11/22/22: cr 0.86, alb 3.7, LFTs WNL, , uric acid 5.1, wbc 2.16, hgb 12.8, plt 76, mcv 107.8, ALC 0.35, ANC 1.11  10/25/2022:  Creatinine 0.89, albumin 3.5, LFTs within normal limits, , uric acid 3.6.  WBC count 3.15, hemoglobin 12.7, .3, platelet count 125 1000, ANC 2.28.  10/11/2022:  Creatinine 1.04, albumin 3.6, LFTs within normal limits, magnesium 2.0, phosphorus 2.7, , uric acid 3.2, WBC 2.4, hemoglobin 13.4, .5, platelet count 85653.  ANC 1.77.  09/13/2022:  Creatinine 0.80, albumin 3.7, LFTs within normal limits, , uric acid 4.0, WBC count 2.37, hemoglobin 13.2, , platelet count 71292, ANC 1.45.  08/16/2022:  Creatinine 0.81, albumin 3.8, calcium 9.2, total bili 0.5, LFTs within normal limits, uric acid 3.3, , WBC 3.45, hemoglobin 13.7, .8, platelet count 88241, ANC 2.06.  07/19/2022:  WBC 3.75, hemoglobin 13.1, .5, platelet count 75633, ANC 2.32, uric acid 4.1, , creatinine 1.09, albumin 3.5, calcium 9.6, LFTs within normal limits.  06/21/2022 WBC at 2.58 bed neutrophil adequate at 55%.  Hemoglobin 12.7, hematocrit 39.4.  06/14/2022:  Creatinine 0.99, calcium 9.1, , uric acid 3.7, phosphorus 3.3, potassium 4.5, WBC count 2.58, hemoglobin 12.7, platelet count 91817, ANC 1.44.  05/27/2022:  Potassium 4.9, sodium 140, creatinine 0.87, , uric acid 6.6.  05/17/2022:  Creatinine 1.03, albumin 3.3, ,  WBC count 10.32, hemoglobin 12.4, platelet count 135 1000.   05/02/2022:  Creatinine 0.93, albumin 3.6, total protein 6.4, ,  potassium 5.2, WBC count 9.6, hemoglobin 11.4, .4, platelet count 92.   4/ Cr 0.89 Alb 3.4 TP 6.4 Ca 9.4  TSH 2.0 Iron 58n TIBC 285 Ferritin 48 Folate 9.9 B12 426 Hep B s ag neg Uric acid 7.8 Hep C ab neg WBC 9.79 RBC 3.77 Hg 11.8 .5 RDW 13.4 PLT 91 ANC 2.79 ALC 6.36 Direct Ramos neg b2 micro 7.5 Copper 135 Hapto 225 Hep B core ab neg SPEP w/ ARABELLA normal Abs kappa 32 Abs lambda 124 SFLC ratio 0.26  3/18/22 Cr 1 AST 27 ALT 12 AlkPhos 87 Alb 4.1 TP 6.5 Ca 9.8 WBC 9.4 RBC 3.92 Hg 12.2 MCV 96 RDW 15.1  ALC 5.8 ANC 3  5/15/18 WBC 14.71 RBC 4.42 Hg 14.3 MCV 98.6 RDW 13  ANC 2.91 ALC 10.13 AMC 1.29    Imagin2023:  PET-CT-interval resolution of moderate uptake in right axillary lymph node with no current PET evidence of metabolically active residual or recurrent lymphoma.    22 PET CT: Stable right axillary lymph node with mild-to-moderate uptake, representing residual metabolically active tumor. No new suspicious uptake noted     2022 PET-CT:  Excellent response to therapy since her last exam with interval resolution or near complete resolution of multiple hypermetabolic lymph nodes above and below the diaphragm.  There is residual mild-to-moderate uptake in the right axillary lymph node, Deauville score 4  Interval resolution of diffuse uptake in spleen which is also decreased in size.  No new suspicious uptake.     05/10/22:-  right lower extremity Doppler negative for DVT       22 PET CT: multiple hypermetabolic LNs b/l neck. Right submandibular LN .7x1.7cm, SUV 7.8. Left submandibular LN 2.3x1.7cm SUV 7.2. Multiple hypermetabolic LNs in chest. Right axillary node 3.3x2cm SUV 8.1. Hypermetabolic left axillary LN SUV 6.4, 1.9x3.2cm. Hypermetabolic right paratracheal LN 2.4x1.6cm SUV 4.9. Right pulmonary hilum SUV 2.7. Subcarinal LN SUV 3.3, 1.2x3.3cm. Spleen enlarged at 15cm and demonstrates heterogeneous mild to moderate uptake SUV 5.2. Moderately  intense activity in stomach and proximal duodenum with no degree of focality or discrete mass on CT. Multiple hypermetabolic LNs in abd and pelvis. Left periaortic LN SUV 6.8, 4x3.5cm. Large hypermetabolic mesenteric mass [id number] SUV 8.5. Aortocaval LN 3.5x3.8cm SUV 5.1. Hypermetabolic mass in left pelvis 5.5x7cm SUV 7. Left external iliac LN SUV 6 2.2x2.1cm. Extensive hypermetabolism anteriorly and laterally in left abdomen, associated with soft tissue thickening that is difficult to separate from bowel loops. B/l inguinal LNs up to 2.9x3.9cm, SUV 6.6    3/29/22 CT neck w/ and w/o contrast: widespread adenopathy identified in parotic glands bilateral, submandibular space, anterior and posterior cervical chains, superior mediastinum and supraclavicular and infraclavicular spaces of the base of neck/upper chest in appearance strongly suspicious for lymphoma. Multiple subcutaneous masses are also identified in lower occipital scalp and upper neck similar to lymphadenopathy. Biopsy of prominent subcutaneous mass in right upper neck is recommended and should present the easiest location for tissue sampling    Path:  5/23/18 peripheral blood flow cytometry: CD5+ B cell population. No abnormal T cell or myeloid cell populations. CD19+, CD20+, CD5+, CD23+, CD11c+ surface kappa light chain+, CD38-, CD22+ (dim/mod), CD43+, FMC7-. Consistent with CLL     04/26/2022:  Bone marrow biopsy-marrow -trilineage hematopoiesis with 10-20% B-cell infiltrate, C5 positive B-cell population with immunophenotype typical for CLL.  CLL fish within normal limits, heavy chain mutation status cannot be determined.  6q, chromosome 12, chromosome 13, chromosome 11, chromosome 17 and t( 11;14) abnormalities not detected.     05/06/2022 left axillary lymph node core biopsy:  Mantle cell lymphoma with a subset consistent with CLL/SLL., CD5 positive monoclonal B-cell population is identified, approximately 91% of total cells, immuno phenotype  is nonspecific and may be seen in CLL, marginal zone lymphoma, or large cell lymphoma.  A 2nd small monoclonal B-cell population approximately 2% of the cells identified with an immunophenotype consistent with CLL.  New genomic consultation lymphoma cells positive for CD 20, CD 5, cyclin D1 and BCL2 with a TI 67 of 10%.  There is no significant staining for CD10, BCL6.  However workup consistent with diagnosis of mantle cell lymphoma.        Past Medical History:   Diagnosis Date    Cataract     Lymphoma        Past Surgical History:   Procedure Laterality Date    HERNIA REPAIR         Family History   Problem Relation Age of Onset    Kidney disease Mother     Colon cancer Father     Colon cancer Sister     Breast cancer Sister     Uterine cancer Sister        Social History     Socioeconomic History    Marital status:    Tobacco Use    Smoking status: Never    Smokeless tobacco: Never   Substance and Sexual Activity    Alcohol use: Not Currently    Drug use: Never       Current Outpatient Medications   Medication Sig Dispense Refill    allopurinoL (ZYLOPRIM) 300 MG tablet Take 1 tablet (300 mg total) by mouth once daily. 30 tablet 5    aspirin (ECOTRIN) 81 MG EC tablet Take 81 mg by mouth once daily.      carvediloL (COREG) 12.5 MG tablet Take 1 tablet by mouth 2 (two) times a day.      EScitalopram oxalate (LEXAPRO) 10 MG tablet Take 1 tablet by mouth once daily.      ezetimibe (ZETIA) 10 mg tablet Take 10 mg by mouth once daily.      ondansetron (ZOFRAN) 8 MG tablet Take 1 tablet (8 mg total) by mouth every 8 (eight) hours as needed for Nausea. 30 tablet 1    pregabalin (LYRICA) 50 MG capsule pregabalin 50 mg capsule   TAKE 1 CAPSULE BY MOUTH TWICE DAILY      rosuvastatin (CRESTOR) 20 MG tablet Take 20 mg by mouth once daily.      sulfamethoxazole-trimethoprim 800-160mg (BACTRIM DS) 800-160 mg Tab Take 1 tablet by mouth every Mon, Wed, Fri. 30 tablet 3    traMADoL (ULTRAM) 50 mg tablet Take 1 tablet (50 mg  total) by mouth every 6 (six) hours as needed for Pain. 30 tablet 0    ibrutinib (IMBRUVICA) 420 mg Tab Take 420 mg by mouth once daily. (Patient not taking: Reported on 6/15/2023.) 30 tablet 3    valACYclovir (VALTREX) 500 MG tablet Take 1 tablet (500 mg total) by mouth 2 (two) times daily. 60 tablet 3     No current facility-administered medications for this visit.       Review of patient's allergies indicates:   Allergen Reactions    Pregabalin           ROS  CONSTITUTIONAL: no fevers, no chills,  No weight loss, no  fatigue, no weakness  HEMATOLOGIC: no abnormal bleeding, no abnormal bruising, no drenching night sweats  ONCOLOGIC: no new masses or lumps  HEENT: no vision loss, no tinnitus or hearing loss, no nose bleeding, no dysphagia, no odynophagia  CVS: no chest pain, no palpitations, no dyspnea on exertion  RESP: no shortness of breath, no hemoptysis, no cough  GI: no nausea, no vomiting, no diarrhea, no constipation, no melena, no hematochezia, no hematemesis, no abdominal pain, no increase in abdominal girth  : no dysuria, no hematuria, no hesitancy, no scrotal swelling, no discharge  INTEGUMENT: no rashes, no abnormal bruising, no nail pitting, no hyperpigmentation  NEURO: no falls, no memory loss, no paresthesias or dysesthesias, no urofecal incontinence or retention, no loss of strength on any extremity  MSK: no back pain, no new joint pain, no joint swelling  PSYCH: no suicidal or homicidal ideation, no depression, no insomnia, no anhedonia  ENDOCRINE: no heat or cold intolerance, no polyuria, no polydipsia          Physical exam     Vitals:    07/18/23 1000   BP: 118/68   Pulse: 65   Resp: 18   Temp: 97.6 °F (36.4 °C)         GEN: AAOx3, NAD  RESP: audible breathing normal patient able to speak without shortness of breath   NEURO: normal mentation,  no sensory deficits  Unable to assess all other systems due to telephone visit          ASSESSMENT:     # Mantle cell lymphoma, [place] stage III or  IV based on PET/CT  Ki 67 10%, MIPI 6.7, high risk, SOX11+  Bone marrow biopsy negative for evidence of mantle cell lymphoma.  Positive for CLL.  PET-CT with evidence of bulky lymphadenopathy, largest measuring 14 cm, spleen measuring 15 cm.  Discussed with patient and his family the diagnosis, natural history and treatment options.  Discussed the risk for TLS given bulky lymphadenopathy, perforation given lymphadenopathy around the bowel loops as well as declining ECOG given his advanced age.  Continuet PJP prophylaxis with Bactrim Monday Wednesday Friday and valacyclovir with treatment.  Continue allopurinol 300 mg q.day for TLS prophylaxis.  Patient is status post cycle 1 of bendamustine and rituximab on 05/18/2022.  Patient has bendamustine was reduced by 25% and rituximab was given 1 week apart to reduce the risk of perforation.  S/p cycle 2 and cycle 3 without significant side effects, dosed for bendamustine increased to 100%.  Discussed with patient interim PET-CT with excellent response to treatment however with residual hypermetabolism in right axillary lymph node with a Deauville score of 4.  Given his excellent tolerance of treatment, will proceed to complete 6 cycles of BR in the setting of above PET-CT report.  Discussed alternate treatment plan to proceed with ibrutinib given partial response.  Patient elected to proceed with another 2 cycles of BR with plans to repeat PET-CT after completion of treatment to assess treatment response.  Given incomplete response on PET-CT after completion of 6 hours SC, he was started on ibrutinib in second-line initially at 560 mg q.day.    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      Based on available information, likely SLL/CLL with some constitutional symptoms including weight  loss, fatigue and night sweats. Adenopathy easily palpable above and below the diaphragm. Mild thrombocytopenia w/ PLT count ~120k and very mild anemia with Hg ~12 g/dl. No hypercalcemia, no evidence of immune paresis  Biopsies 4/11/22 with Dr Brannon were FNAs and non diagnostic for CLL. I do not know what IHC was obtained or whether pathologist knew what the potential diagnosis was as report only says negative for carcinoma. Will discuss with Dr Lan to add CD5, CD10, CD19, CD20 etc if enough tissue  Regardless, will also refer for axillary LN biopsy, either excisional or at the very least core. Referral sent today  PET CT 4/2022 findings are noted. Given his advanced age he was not refer for EGD. Masses in abdomen are likely matted gurinder conglomerates and I do not think it would be worth it to attempt a biopsy of those  CLL FISH  Negative , IGHV mutation status  Hep B and C negative  Allopurinol 300mg daily with PO hydration prior to initiation of any therapy  Given concomitant diagnosis of CLL and mantle cell lymphoma will plan to initiate treatment for mantle cell lymphoma and CLL with bendamustine rituximab.  Will consider BTK inhibitors if patient is intolerant to bendamustine/rituximab in the second-line      Plan:  Reviewed labs, noted platelet count less than 69,000 despite dose reduction to 420 mg  Discussed the plan to reduce it further to 280 mg given persistent thrombocytopenia.    New prescription sent to pharmacy  Plan to repeat patient's CBC next week to ensure improvement in thrombocytopenia prior to initiating dose reduced ibrutinib  He will continue prophylaxis with acyclovir and Bactrim  Return to clinic in 4 weeks with repeat labs and a PET-CT to assess treatment response      Portions of the record may have been created with voice recognition software. Occasional wrong-word or sound-a-like substitutions may have occurred due to the inherent limitations of voice recognition software. Read  the chart carefully and recognize, using context, where substitutions have occurred.

## 2023-08-17 NOTE — PROGRESS NOTES
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     Treatment history    05/18/2022-10/26/2022:  6 cycles of bendamustine rituximab  December 2022:  Ibrutinib 560 mg q.day  03/16/2023-07/18/2023:  Ibrutinib 420 mg q.d.  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 He also had a single visit with Dr Snow in 2018 but never followed back.   Patient had a PET-CT as well as a bone marrow biopsy and repeat peripheral flow cytometry with a diagnosis of CLL/SLL and mantle cell lymphoma.  Patient had symptoms of fatigue and weight loss prior to his workup and diagnosis.  He was diagnosed with mantle cell lymphoma and CLL and started on treatment with bendamustine rituximab, with incomplete response following which he was started on ibrutinib.      Interval history     Today, 07/18/2023, patient denies any concerns today.  He denies any fevers, chills, drenching night sweats, decreased appetite, weight loss or new lumps or bumps.  He denies any new medications, ER or hospital visits.    Labs:  08/14/2023 CMP unremarkable, , uric acid 6.8, WBC count 4.23, hemoglobin 13.6, , platelet count [id number], ANC 2.87.  06/14/23:  wbc 3.26, hgb 13.9, plt 102, ANC 2.21 Cr 1.14 total bili 0.4 AST 26 ALT 23   05/31/23: wbc 3.69, hgb 12.9, plt 4,000, ANC 2.58  05/26/23: cr 1.11, alb 3.6, ca 9.3, LFTs WNL, , uric acid 6.7, mag 2.1, phosphorus 2.7  05/03/2023:  Creatinine 0.86, albumin 3.6, calcium 9.3, alkaline phosphatase 61, total bili 0.5, AST 19, ALT 16, magnesium 2.2, phosphorus 3.2, , uric acid 6.5, WBC count 2.6, hemoglobin 12.7, MCV  106.3, platelet count 124, ANC 1.69.  04/12/2023:  Creatinine 0.83, albumin 3.5, calcium 9.2, , uric acid 3.8, LFTs within normal limits, magnesium 2.0, phosphorus 2.6, WBC count 2.7, hemoglobin 12.6, ANC 2000, ALC 0.28.  03/28/23: CMP unremarkable except ca 10.2, mag 2.2, phosphorus 3.0, , uric acid 4.2, wbc 3.43, hgb 13.3, plt 78, ANC 2.48  03/10/23: CMP unremarkable, wbc 2.57, hgb 12.8, plt 87, ANC 1.85  03/01/23: cr 0.96, ca 9.2, alb 3.4, AST 35, ALT 24, , mag 1.9, phosphorus 3.4, uric acid 3.8, wbc 3.23, hgb 12.5, plt 74,000, ANC 2.39  02/01/23: Cr 0.92, ca 9.6, alb 3.6, LFTs WNL, wbc 3.08, hgb 12.6, plt 102, ANC 2.15, ALC 0.25  01/20/23; cr 0.97, alb 3.5, LFTs WNL, wbc 3.06, hgb 12.8, plt 64, ANC 2.25  11/22/22: cr 0.86, alb 3.7, LFTs WNL, , uric acid 5.1, wbc 2.16, hgb 12.8, plt 76, mcv 107.8, ALC 0.35, ANC 1.11  10/25/2022:  Creatinine 0.89, albumin 3.5, LFTs within normal limits, , uric acid 3.6.  WBC count 3.15, hemoglobin 12.7, .3, platelet count 125 1000, ANC 2.28.  10/11/2022:  Creatinine 1.04, albumin 3.6, LFTs within normal limits, magnesium 2.0, phosphorus 2.7, , uric acid 3.2, WBC 2.4, hemoglobin 13.4, .5, platelet count 07035.  ANC 1.77.  09/13/2022:  Creatinine 0.80, albumin 3.7, LFTs within normal limits, , uric acid 4.0, WBC count 2.37, hemoglobin 13.2, , platelet count 65415, ANC 1.45.  08/16/2022:  Creatinine 0.81, albumin 3.8, calcium 9.2, total bili 0.5, LFTs within normal limits, uric acid 3.3, , WBC 3.45, hemoglobin 13.7, .8, platelet count 54090, ANC 2.06.  07/19/2022:  WBC 3.75, hemoglobin 13.1, .5, platelet count 65813, ANC 2.32, uric acid 4.1, , creatinine 1.09, albumin 3.5, calcium 9.6, LFTs within normal limits.  06/21/2022 WBC at 2.58 bed neutrophil adequate at 55%.  Hemoglobin 12.7, hematocrit 39.4.  06/14/2022:  Creatinine 0.99, calcium 9.1, , uric acid 3.7, phosphorus 3.3,  potassium 4.5, WBC count 2.58, hemoglobin 12.7, platelet count 95215, ANC 1.44.  2022:  Potassium 4.9, sodium 140, creatinine 0.87, , uric acid 6.6.  2022:  Creatinine 1.03, albumin 3.3, ,  WBC count 10.32, hemoglobin 12.4, platelet count 135 1000.   2022:  Creatinine 0.93, albumin 3.6, total protein 6.4, , potassium 5.2, WBC count 9.6, hemoglobin 11.4, .4, platelet count 92.   4/ Cr 0.89 Alb 3.4 TP 6.4 Ca 9.4  TSH 2.0 Iron 58n TIBC 285 Ferritin 48 Folate 9.9 B12 426 Hep B s ag neg Uric acid 7.8 Hep C ab neg WBC 9.79 RBC 3.77 Hg 11.8 .5 RDW 13.4 PLT 91 ANC 2.79 ALC 6.36 Direct Ramos neg b2 micro 7.5 Copper 135 Hapto 225 Hep B core ab neg SPEP w/ ARABELLA normal Abs kappa 32 Abs lambda 124 SFLC ratio 0.26  3/18/22 Cr 1 AST 27 ALT 12 AlkPhos 87 Alb 4.1 TP 6.5 Ca 9.8 WBC 9.4 RBC 3.92 Hg 12.2 MCV 96 RDW 15.1  ALC 5.8 ANC 3  5/15/18 WBC 14.71 RBC 4.42 Hg 14.3 MCV 98.6 RDW 13  ANC 2.91 ALC 10.13 AMC 1.29    Imagin2023 PET-CT:  Stable PET-CT without evidence of metabolically active residual or recurrent lymphoma.    2023:  PET-CT-interval resolution of moderate uptake in right axillary lymph node with no current PET evidence of metabolically active residual or recurrent lymphoma.    22 PET CT: Stable right axillary lymph node with mild-to-moderate uptake, representing residual metabolically active tumor. No new suspicious uptake noted     2022 PET-CT:  Excellent response to therapy since her last exam with interval resolution or near complete resolution of multiple hypermetabolic lymph nodes above and below the diaphragm.  There is residual mild-to-moderate uptake in the right axillary lymph node, Deauville score 4  Interval resolution of diffuse uptake in spleen which is also decreased in size.  No new suspicious uptake.     05/10/22:-  right lower extremity Doppler negative for DVT       22 PET CT: multiple  hypermetabolic LNs b/l neck. Right submandibular LN .7x1.7cm, SUV 7.8. Left submandibular LN 2.3x1.7cm SUV 7.2. Multiple hypermetabolic LNs in chest. Right axillary node 3.3x2cm SUV 8.1. Hypermetabolic left axillary LN SUV 6.4, 1.9x3.2cm. Hypermetabolic right paratracheal LN 2.4x1.6cm SUV 4.9. Right pulmonary hilum SUV 2.7. Subcarinal LN SUV 3.3, 1.2x3.3cm. Spleen enlarged at 15cm and demonstrates heterogeneous mild to moderate uptake SUV 5.2. Moderately intense activity in stomach and proximal duodenum with no degree of focality or discrete mass on CT. Multiple hypermetabolic LNs in abd and pelvis. Left periaortic LN SUV 6.8, 4x3.5cm. Large hypermetabolic mesenteric mass [id number] SUV 8.5. Aortocaval LN 3.5x3.8cm SUV 5.1. Hypermetabolic mass in left pelvis 5.5x7cm SUV 7. Left external iliac LN SUV 6 2.2x2.1cm. Extensive hypermetabolism anteriorly and laterally in left abdomen, associated with soft tissue thickening that is difficult to separate from bowel loops. B/l inguinal LNs up to 2.9x3.9cm, SUV 6.6    3/29/22 CT neck w/ and w/o contrast: widespread adenopathy identified in parotic glands bilateral, submandibular space, anterior and posterior cervical chains, superior mediastinum and supraclavicular and infraclavicular spaces of the base of neck/upper chest in appearance strongly suspicious for lymphoma. Multiple subcutaneous masses are also identified in lower occipital scalp and upper neck similar to lymphadenopathy. Biopsy of prominent subcutaneous mass in right upper neck is recommended and should present the easiest location for tissue sampling    Path:  5/23/18 peripheral blood flow cytometry: CD5+ B cell population. No abnormal T cell or myeloid cell populations. CD19+, CD20+, CD5+, CD23+, CD11c+ surface kappa light chain+, CD38-, CD22+ (dim/mod), CD43+, FMC7-. Consistent with CLL     04/26/2022:  Bone marrow biopsy-marrow -trilineage hematopoiesis with 10-20% B-cell infiltrate, C5 positive B-cell  population with immunophenotype typical for CLL.  CLL fish within normal limits, heavy chain mutation status cannot be determined.  6q, chromosome 12, chromosome 13, chromosome 11, chromosome 17 and t( 11;14) abnormalities not detected.     05/06/2022 left axillary lymph node core biopsy:  Mantle cell lymphoma with a subset consistent with CLL/SLL., CD5 positive monoclonal B-cell population is identified, approximately 91% of total cells, immuno phenotype is nonspecific and may be seen in CLL, marginal zone lymphoma, or large cell lymphoma.  A 2nd small monoclonal B-cell population approximately 2% of the cells identified with an immunophenotype consistent with CLL.  New genomic consultation lymphoma cells positive for CD 20, CD 5, cyclin D1 and BCL2 with a TI 67 of 10%.  There is no significant staining for CD10, BCL6.  However workup consistent with diagnosis of mantle cell lymphoma.        Past Medical History:   Diagnosis Date    Cataract     Lymphoma        Past Surgical History:   Procedure Laterality Date    HERNIA REPAIR         Family History   Problem Relation Age of Onset    Kidney disease Mother     Colon cancer Father     Colon cancer Sister     Breast cancer Sister     Uterine cancer Sister        Social History     Socioeconomic History    Marital status:    Tobacco Use    Smoking status: Never    Smokeless tobacco: Never   Substance and Sexual Activity    Alcohol use: Not Currently    Drug use: Never       Current Outpatient Medications   Medication Sig Dispense Refill    allopurinoL (ZYLOPRIM) 300 MG tablet Take 1 tablet (300 mg total) by mouth once daily. 30 tablet 5    aspirin (ECOTRIN) 81 MG EC tablet Take 81 mg by mouth once daily.      carvediloL (COREG) 12.5 MG tablet Take 1 tablet by mouth 2 (two) times a day.      EScitalopram oxalate (LEXAPRO) 10 MG tablet Take 1 tablet by mouth once daily.      ezetimibe (ZETIA) 10 mg tablet Take 10 mg by mouth once daily.      ondansetron (ZOFRAN)  8 MG tablet Take 1 tablet (8 mg total) by mouth every 8 (eight) hours as needed for Nausea. 30 tablet 1    pregabalin (LYRICA) 50 MG capsule pregabalin 50 mg capsule   TAKE 1 CAPSULE BY MOUTH TWICE DAILY      rosuvastatin (CRESTOR) 20 MG tablet Take 20 mg by mouth once daily.      sulfamethoxazole-trimethoprim 800-160mg (BACTRIM DS) 800-160 mg Tab Take 1 tablet by mouth every Mon, Wed, Fri. 30 tablet 3    traMADoL (ULTRAM) 50 mg tablet Take 1 tablet (50 mg total) by mouth every 6 (six) hours as needed for Pain. 30 tablet 0    valACYclovir (VALTREX) 500 MG tablet Take 1 tablet (500 mg total) by mouth 2 (two) times daily. 60 tablet 3     No current facility-administered medications for this visit.       Review of patient's allergies indicates:   Allergen Reactions    Pregabalin           ROS  CONSTITUTIONAL: no fevers, no chills,  No weight loss, no  fatigue, no weakness  HEMATOLOGIC: no abnormal bleeding, no abnormal bruising, no drenching night sweats  ONCOLOGIC: no new masses or lumps  HEENT: no vision loss, no tinnitus or hearing loss, no nose bleeding, no dysphagia, no odynophagia  CVS: no chest pain, no palpitations, no dyspnea on exertion  RESP: no shortness of breath, no hemoptysis, no cough  GI: no nausea, no vomiting, no diarrhea, no constipation, no melena, no hematochezia, no hematemesis, no abdominal pain, no increase in abdominal girth  : no dysuria, no hematuria, no hesitancy, no scrotal swelling, no discharge  INTEGUMENT: no rashes, no abnormal bruising, no nail pitting, no hyperpigmentation  NEURO: no falls, no memory loss, no paresthesias or dysesthesias, no urofecal incontinence or retention, no loss of strength on any extremity  MSK: no back pain, no new joint pain, no joint swelling  PSYCH: no suicidal or homicidal ideation, no depression, no insomnia, no anhedonia  ENDOCRINE: no heat or cold intolerance, no polyuria, no polydipsia          Physical exam     Vitals:    08/18/23 1150   BP: 132/76    Pulse: 63   Resp: 18   Temp: 98 °F (36.7 °C)       GEN: AAOx3, NAD  HEENT: PERRLA, EOMI, edentulous, no oral ulcers  NECK:  Supple, full range of motion  LYMPH:  No axillary, supra clavicle or cervical adenopathy appreciated on today's exam.  CVS: s1s2 RRR, no M/R/G, port in place in right chest wall without surrounding edema or erythema.  RESP: CTA b/l, no crackles, no wheezes or rhonchi, well-healed herpes zoster rash.  ABD: soft, NT, ND, BS+, no hepatomegaly, mild splenomegaly,   EXT: no deformities, right lower extremity edema.  SKIN:  No rash,.  No evidence of petechia.  NEURO: normal mentation, strength 5/5 on all 4 extremities, no sensory deficits       ASSESSMENT:     # Mantle cell lymphoma, [place]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  Positive for CLL.  PET-CT with evidence of bulky lymphadenopathy, largest measuring 14 cm, spleen measuring 15 cm.  Discussed with patient and his family the diagnosis, natural history and treatment options.  Discussed the risk for TLS given bulky lymphadenopathy, perforation given lymphadenopathy around the bowel loops as well as declining ECOG given his advanced age.  Continuet PJP prophylaxis with Bactrim Monday Wednesday Friday and valacyclovir with treatment.  Continue allopurinol 300 mg q.day for TLS prophylaxis.  Patient is status post cycle 1 of bendamustine and rituximab on 05/18/2022.  Patient has bendamustine was reduced by 25% and rituximab was given 1 week apart to reduce the risk of perforation.  S/p cycle 2 and cycle 3 without significant side effects, dosed for bendamustine increased to 100%.  Discussed with patient interim PET-CT with excellent response to treatment however with residual hypermetabolism in right axillary lymph node with a Deauville score of 4.  Given his excellent tolerance of treatment, will proceed to complete 6 cycles of BR in the  setting of above PET-CT report.  Discussed alternate treatment plan to proceed with ibrutinib given partial response.  Patient elected to proceed with another 2 cycles of BR with plans to repeat PET-CT after completion of treatment to assess treatment response.  Given incomplete response on PET-CT after completion of 6 hours ND, he was started on ibrutinib in second-line initially at 560 mg q.day.    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      Based on available information, likely SLL/CLL with some constitutional symptoms including weight loss, fatigue and night sweats. Adenopathy easily palpable above and below the diaphragm. Mild thrombocytopenia w/ PLT count ~120k and very mild anemia with Hg ~12 g/dl. No hypercalcemia, no evidence of immune paresis  Biopsies 4/11/22 with Dr Brannon were FNAs and non diagnostic for CLL. I do not know what IHC was obtained or whether pathologist knew what the potential diagnosis was as report only says negative for carcinoma. Will discuss with Dr Lan to add CD5, CD10, CD19, CD20 etc if enough tissue  Regardless, will also refer for axillary LN biopsy, either excisional or at the very least core. Referral sent today  PET CT 4/2022 findings are noted. Given his advanced age he was not refer for EGD. Masses in abdomen are likely matted gurinder conglomerates and I do not think it would be worth it to attempt a biopsy of those  CLL FISH  Negative , IGHV mutation status  Hep B and C negative  Allopurinol 300mg daily with PO hydration prior to initiation of any therapy  Given concomitant diagnosis of CLL and mantle cell lymphoma will plan to initiate treatment for mantle cell lymphoma and CLL with bendamustine rituximab.  Will consider BTK inhibitors  if patient is intolerant to bendamustine/rituximab in the second-line      Plan:  Patient was dose reduced to 280 mg ibrutinib however he still has thrombocytopenia less than 100,000.    In the light of his recent PET scan with no evidence of disease, discussed the plan to give him a treatment break for 8 weeks.    Patient was advised to call our clinic if he were to have any constitutional symptoms   Will plan to follow-up in clinic in 8 weeks with repeat labs to discuss further treatment recommendations.      Portions of the record may have been created with voice recognition software. Occasional wrong-word or sound-a-like substitutions may have occurred due to the inherent limitations of voice recognition software. Read the chart carefully and recognize, using context, where substitutions have occurred.

## 2023-08-18 ENCOUNTER — OFFICE VISIT (OUTPATIENT)
Dept: HEMATOLOGY/ONCOLOGY | Facility: CLINIC | Age: 83
End: 2023-08-18
Payer: MEDICARE

## 2023-08-18 VITALS
SYSTOLIC BLOOD PRESSURE: 132 MMHG | RESPIRATION RATE: 18 BRPM | OXYGEN SATURATION: 95 % | TEMPERATURE: 98 F | HEART RATE: 63 BPM | BODY MASS INDEX: 30.76 KG/M2 | DIASTOLIC BLOOD PRESSURE: 76 MMHG | HEIGHT: 64 IN | WEIGHT: 180.19 LBS

## 2023-08-18 DIAGNOSIS — Z51.11 ENCOUNTER FOR ANTINEOPLASTIC CHEMOTHERAPY: ICD-10-CM

## 2023-08-18 DIAGNOSIS — C83.13 MANTLE CELL LYMPHOMA OF INTRA-ABDOMINAL LYMPH NODES: Primary | ICD-10-CM

## 2023-08-18 PROCEDURE — 99214 OFFICE O/P EST MOD 30 MIN: CPT | Mod: ,,, | Performed by: STUDENT IN AN ORGANIZED HEALTH CARE EDUCATION/TRAINING PROGRAM

## 2023-08-18 PROCEDURE — 99214 PR OFFICE/OUTPT VISIT, EST, LEVL IV, 30-39 MIN: ICD-10-PCS | Mod: ,,, | Performed by: STUDENT IN AN ORGANIZED HEALTH CARE EDUCATION/TRAINING PROGRAM

## 2023-10-10 NOTE — PROGRESS NOTES
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     Treatment history    05/18/2022-10/26/2022:  6 cycles of bendamustine rituximab  December 2022:  Ibrutinib 560 mg q.day  03/16/2023-07/18/2023:  Ibrutinib 420 mg q.d.  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 He also had a single visit with Dr Snow in 2018 but never followed back.   Patient had a PET-CT as well as a bone marrow biopsy and repeat peripheral flow cytometry with a diagnosis of CLL/SLL and mantle cell lymphoma.  Patient had symptoms of fatigue and weight loss prior to his workup and diagnosis.  He was diagnosed with mantle cell lymphoma and CLL and started on treatment with bendamustine rituximab, with incomplete response following which he was started on ibrutinib.      Interval history     Today, 10/13/2023, patient denies any concerns today.  He denies any fevers, chills, drenching night sweats, decreased appetite, weight loss or new lumps or bumps.  He denies any new medications, ER or hospital visits.    Labs:  10/09/2023 creatinine 0.8, LFTs unremarkable, uric acid 6.2, , WBC count 3.1, hemoglobin 13.8, .7, platelet count 36087, ANC 2.16.  08/14/2023 CMP unremarkable, , uric acid 6.8, WBC count 4.23, hemoglobin 13.6, , platelet count [id number], ANC 2.87.  06/14/23:  wbc 3.26, hgb 13.9, plt 102, ANC 2.21 Cr 1.14 total bili 0.4 AST 26 ALT 23   05/31/23: wbc 3.69, hgb 12.9, plt 4,000, ANC 2.58  05/26/23: cr 1.11, alb 3.6, ca 9.3, LFTs WNL, , uric acid 6.7, mag 2.1, phosphorus 2.7  05/03/2023:  Creatinine 0.86, albumin 3.6, calcium 9.3,  alkaline phosphatase 61, total bili 0.5, AST 19, ALT 16, magnesium 2.2, phosphorus 3.2, , uric acid 6.5, WBC count 2.6, hemoglobin 12.7, .3, platelet count 124, ANC 1.69.  04/12/2023:  Creatinine 0.83, albumin 3.5, calcium 9.2, , uric acid 3.8, LFTs within normal limits, magnesium 2.0, phosphorus 2.6, WBC count 2.7, hemoglobin 12.6, ANC 2000, ALC 0.28.  03/28/23: CMP unremarkable except ca 10.2, mag 2.2, phosphorus 3.0, , uric acid 4.2, wbc 3.43, hgb 13.3, plt 78, ANC 2.48  03/10/23: CMP unremarkable, wbc 2.57, hgb 12.8, plt 87, ANC 1.85  03/01/23: cr 0.96, ca 9.2, alb 3.4, AST 35, ALT 24, , mag 1.9, phosphorus 3.4, uric acid 3.8, wbc 3.23, hgb 12.5, plt 74,000, ANC 2.39  02/01/23: Cr 0.92, ca 9.6, alb 3.6, LFTs WNL, wbc 3.08, hgb 12.6, plt 102, ANC 2.15, ALC 0.25  01/20/23; cr 0.97, alb 3.5, LFTs WNL, wbc 3.06, hgb 12.8, plt 64, ANC 2.25  11/22/22: cr 0.86, alb 3.7, LFTs WNL, , uric acid 5.1, wbc 2.16, hgb 12.8, plt 76, mcv 107.8, ALC 0.35, ANC 1.11  10/25/2022:  Creatinine 0.89, albumin 3.5, LFTs within normal limits, , uric acid 3.6.  WBC count 3.15, hemoglobin 12.7, .3, platelet count 125 1000, ANC 2.28.  10/11/2022:  Creatinine 1.04, albumin 3.6, LFTs within normal limits, magnesium 2.0, phosphorus 2.7, , uric acid 3.2, WBC 2.4, hemoglobin 13.4, .5, platelet count 02471.  ANC 1.77.  09/13/2022:  Creatinine 0.80, albumin 3.7, LFTs within normal limits, , uric acid 4.0, WBC count 2.37, hemoglobin 13.2, , platelet count 18709, ANC 1.45.  08/16/2022:  Creatinine 0.81, albumin 3.8, calcium 9.2, total bili 0.5, LFTs within normal limits, uric acid 3.3, , WBC 3.45, hemoglobin 13.7, .8, platelet count 68826, ANC 2.06.  07/19/2022:  WBC 3.75, hemoglobin 13.1, .5, platelet count 09473, ANC 2.32, uric acid 4.1, , creatinine 1.09, albumin 3.5, calcium 9.6, LFTs within normal limits.  06/21/2022 WBC at 2.58 bed  neutrophil adequate at 55%.  Hemoglobin 12.7, hematocrit 39.4.  2022:  Creatinine 0.99, calcium 9.1, , uric acid 3.7, phosphorus 3.3, potassium 4.5, WBC count 2.58, hemoglobin 12.7, platelet count 33184, ANC 1.44.  2022:  Potassium 4.9, sodium 140, creatinine 0.87, , uric acid 6.6.  2022:  Creatinine 1.03, albumin 3.3, ,  WBC count 10.32, hemoglobin 12.4, platelet count 135 1000.   2022:  Creatinine 0.93, albumin 3.6, total protein 6.4, , potassium 5.2, WBC count 9.6, hemoglobin 11.4, .4, platelet count 92.   4/ Cr 0.89 Alb 3.4 TP 6.4 Ca 9.4  TSH 2.0 Iron 58n TIBC 285 Ferritin 48 Folate 9.9 B12 426 Hep B s ag neg Uric acid 7.8 Hep C ab neg WBC 9.79 RBC 3.77 Hg 11.8 .5 RDW 13.4 PLT 91 ANC 2.79 ALC 6.36 Direct Ramos neg b2 micro 7.5 Copper 135 Hapto 225 Hep B core ab neg SPEP w/ ARABELLA normal Abs kappa 32 Abs lambda 124 SFLC ratio 0.26  3/18/22 Cr 1 AST 27 ALT 12 AlkPhos 87 Alb 4.1 TP 6.5 Ca 9.8 WBC 9.4 RBC 3.92 Hg 12.2 MCV 96 RDW 15.1  ALC 5.8 ANC 3  5/15/18 WBC 14.71 RBC 4.42 Hg 14.3 MCV 98.6 RDW 13  ANC 2.91 ALC 10.13 AMC 1.29    Imagin2023 PET-CT:  Stable PET-CT without evidence of metabolically active residual or recurrent lymphoma.    2023:  PET-CT-interval resolution of moderate uptake in right axillary lymph node with no current PET evidence of metabolically active residual or recurrent lymphoma.    22 PET CT: Stable right axillary lymph node with mild-to-moderate uptake, representing residual metabolically active tumor. No new suspicious uptake noted     2022 PET-CT:  Excellent response to therapy since her last exam with interval resolution or near complete resolution of multiple hypermetabolic lymph nodes above and below the diaphragm.  There is residual mild-to-moderate uptake in the right axillary lymph node, Deauville score 4  Interval resolution of diffuse uptake in spleen which is also  decreased in size.  No new suspicious uptake.     05/10/22:-  right lower extremity Doppler negative for DVT       4/13/22 PET CT: multiple hypermetabolic LNs b/l neck. Right submandibular LN .7x1.7cm, SUV 7.8. Left submandibular LN 2.3x1.7cm SUV 7.2. Multiple hypermetabolic LNs in chest. Right axillary node 3.3x2cm SUV 8.1. Hypermetabolic left axillary LN SUV 6.4, 1.9x3.2cm. Hypermetabolic right paratracheal LN 2.4x1.6cm SUV 4.9. Right pulmonary hilum SUV 2.7. Subcarinal LN SUV 3.3, 1.2x3.3cm. Spleen enlarged at 15cm and demonstrates heterogeneous mild to moderate uptake SUV 5.2. Moderately intense activity in stomach and proximal duodenum with no degree of focality or discrete mass on CT. Multiple hypermetabolic LNs in abd and pelvis. Left periaortic LN SUV 6.8, 4x3.5cm. Large hypermetabolic mesenteric mass [id number] SUV 8.5. Aortocaval LN 3.5x3.8cm SUV 5.1. Hypermetabolic mass in left pelvis 5.5x7cm SUV 7. Left external iliac LN SUV 6 2.2x2.1cm. Extensive hypermetabolism anteriorly and laterally in left abdomen, associated with soft tissue thickening that is difficult to separate from bowel loops. B/l inguinal LNs up to 2.9x3.9cm, SUV 6.6    3/29/22 CT neck w/ and w/o contrast: widespread adenopathy identified in parotic glands bilateral, submandibular space, anterior and posterior cervical chains, superior mediastinum and supraclavicular and infraclavicular spaces of the base of neck/upper chest in appearance strongly suspicious for lymphoma. Multiple subcutaneous masses are also identified in lower occipital scalp and upper neck similar to lymphadenopathy. Biopsy of prominent subcutaneous mass in right upper neck is recommended and should present the easiest location for tissue sampling    Path:  5/23/18 peripheral blood flow cytometry: CD5+ B cell population. No abnormal T cell or myeloid cell populations. CD19+, CD20+, CD5+, CD23+, CD11c+ surface kappa light chain+, CD38-, CD22+ (dim/mod), CD43+, FMC7-.  Consistent with CLL     04/26/2022:  Bone marrow biopsy-marrow -trilineage hematopoiesis with 10-20% B-cell infiltrate, C5 positive B-cell population with immunophenotype typical for CLL.  CLL fish within normal limits, heavy chain mutation status cannot be determined.  6q, chromosome 12, chromosome 13, chromosome 11, chromosome 17 and t( 11;14) abnormalities not detected.     05/06/2022 left axillary lymph node core biopsy:  Mantle cell lymphoma with a subset consistent with CLL/SLL., CD5 positive monoclonal B-cell population is identified, approximately 91% of total cells, immuno phenotype is nonspecific and may be seen in CLL, marginal zone lymphoma, or large cell lymphoma.  A 2nd small monoclonal B-cell population approximately 2% of the cells identified with an immunophenotype consistent with CLL.  New genomic consultation lymphoma cells positive for CD 20, CD 5, cyclin D1 and BCL2 with a TI 67 of 10%.  There is no significant staining for CD10, BCL6.  However workup consistent with diagnosis of mantle cell lymphoma.        Past Medical History:   Diagnosis Date    Cataract     Lymphoma        Past Surgical History:   Procedure Laterality Date    HERNIA REPAIR         Family History   Problem Relation Age of Onset    Kidney disease Mother     Colon cancer Father     Colon cancer Sister     Breast cancer Sister     Uterine cancer Sister        Social History     Socioeconomic History    Marital status:    Tobacco Use    Smoking status: Never    Smokeless tobacco: Never   Substance and Sexual Activity    Alcohol use: Not Currently    Drug use: Never       Current Outpatient Medications   Medication Sig Dispense Refill    allopurinoL (ZYLOPRIM) 300 MG tablet Take 1 tablet (300 mg total) by mouth once daily. 30 tablet 5    aspirin (ECOTRIN) 81 MG EC tablet Take 81 mg by mouth once daily.      carvediloL (COREG) 12.5 MG tablet Take 1 tablet by mouth 2 (two) times a day.      EScitalopram oxalate (LEXAPRO) 10  MG tablet Take 1 tablet by mouth once daily.      ezetimibe (ZETIA) 10 mg tablet Take 10 mg by mouth once daily.      ondansetron (ZOFRAN) 8 MG tablet Take 1 tablet (8 mg total) by mouth every 8 (eight) hours as needed for Nausea. 30 tablet 1    pregabalin (LYRICA) 50 MG capsule pregabalin 50 mg capsule   TAKE 1 CAPSULE BY MOUTH TWICE DAILY      rosuvastatin (CRESTOR) 20 MG tablet Take 20 mg by mouth once daily.      sulfamethoxazole-trimethoprim 800-160mg (BACTRIM DS) 800-160 mg Tab Take 1 tablet by mouth every Mon, Wed, Fri. 30 tablet 3    traMADoL (ULTRAM) 50 mg tablet Take 1 tablet (50 mg total) by mouth every 6 (six) hours as needed for Pain. 30 tablet 0    valACYclovir (VALTREX) 500 MG tablet Take 1 tablet (500 mg total) by mouth 2 (two) times daily. 60 tablet 3     No current facility-administered medications for this visit.       Review of patient's allergies indicates:   Allergen Reactions    Pregabalin           ROS  CONSTITUTIONAL: no fevers, no chills,  No weight loss, no  fatigue, no weakness  HEMATOLOGIC: no abnormal bleeding, no abnormal bruising, no drenching night sweats  ONCOLOGIC: no new masses or lumps  HEENT: no vision loss, no tinnitus or hearing loss, no nose bleeding, no dysphagia, no odynophagia  CVS: no chest pain, no palpitations, no dyspnea on exertion  RESP: no shortness of breath, no hemoptysis, no cough  GI: no nausea, no vomiting, no diarrhea, no constipation, no melena, no hematochezia, no hematemesis, no abdominal pain, no increase in abdominal girth  : no dysuria, no hematuria, no hesitancy, no scrotal swelling, no discharge  INTEGUMENT: no rashes, no abnormal bruising, no nail pitting, no hyperpigmentation  NEURO: no falls, no memory loss, no paresthesias or dysesthesias, no urofecal incontinence or retention, no loss of strength on any extremity  MSK: no back pain, no new joint pain, no joint swelling  PSYCH: no suicidal or homicidal ideation, no depression, no insomnia, no  anhedonia  ENDOCRINE: no heat or cold intolerance, no polyuria, no polydipsia          Physical exam     Vitals:    10/13/23 1033   BP: 128/79   Pulse: 64   Resp: 18   Temp: 97.4 °F (36.3 °C)       GEN: AAOx3, NAD  HEENT: PERRLA, EOMI, edentulous, no oral ulcers  NECK:  Supple, full range of motion  LYMPH:  No axillary, supra clavicle or cervical adenopathy appreciated on today's exam.  CVS: s1s2 RRR, no M/R/G, port in place in right chest wall without surrounding edema or erythema.  RESP: CTA b/l, no crackles, no wheezes or rhonchi, well-healed herpes zoster rash.  ABD: soft, NT, ND, BS+, no hepatomegaly, mild splenomegaly,   EXT: no deformities, right lower extremity edema.  SKIN:  No rash,.  No evidence of petechia.  NEURO: normal mentation, strength 5/5 on all 4 extremities, no sensory deficits       ASSESSMENT:     # Mantle cell lymphoma, [place]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  Positive for CLL.  PET-CT with evidence of bulky lymphadenopathy, largest measuring 14 cm, spleen measuring 15 cm.  Discussed with patient and his family the diagnosis, natural history and treatment options.  Discussed the risk for TLS given bulky lymphadenopathy, perforation given lymphadenopathy around the bowel loops as well as declining ECOG given his advanced age.  Continuet PJP prophylaxis with Bactrim Monday Wednesday Friday and valacyclovir with treatment.  Continue allopurinol 300 mg q.day for TLS prophylaxis.  Patient is status post cycle 1 of bendamustine and rituximab on 05/18/2022.  Patient has bendamustine was reduced by 25% and rituximab was given 1 week apart to reduce the risk of perforation.  S/p cycle 2 and cycle 3 without significant side effects, dosed for bendamustine increased to 100%.  Discussed with patient interim PET-CT with excellent response to treatment however with residual hypermetabolism in right  axillary lymph node with a Deauville score of 4.  Given his excellent tolerance of treatment, will proceed to complete 6 cycles of BR in the setting of above PET-CT report.  Discussed alternate treatment plan to proceed with ibrutinib given partial response.  Patient elected to proceed with another 2 cycles of BR with plans to repeat PET-CT after completion of treatment to assess treatment response.  Given incomplete response on PET-CT after completion of 6 hours IN, he was started on ibrutinib in second-line initially at 560 mg q.day.    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      Based on available information, likely SLL/CLL with some constitutional symptoms including weight loss, fatigue and night sweats. Adenopathy easily palpable above and below the diaphragm. Mild thrombocytopenia w/ PLT count ~120k and very mild anemia with Hg ~12 g/dl. No hypercalcemia, no evidence of immune paresis  Biopsies 4/11/22 with Dr Brannon were FNAs and non diagnostic for CLL. I do not know what IHC was obtained or whether pathologist knew what the potential diagnosis was as report only says negative for carcinoma. Will discuss with Dr Lan to add CD5, CD10, CD19, CD20 etc if enough tissue  Regardless, will also refer for axillary LN biopsy, either excisional or at the very least core. Referral sent today  PET CT 4/2022 findings are noted. Given his advanced age he was not refer for EGD. Masses in abdomen are likely matted gurinder conglomerates and I do not think it would be worth it to attempt a biopsy of those  CLL FISH  Negative , IGHV mutation status  Hep B and C negative  Allopurinol 300mg daily with PO hydration prior to initiation of any therapy  Given concomitant diagnosis of CLL and  mantle cell lymphoma will plan to initiate treatment for mantle cell lymphoma and CLL with bendamustine rituximab.  Will consider BTK inhibitors if patient is intolerant to bendamustine/rituximab in the second-line      Plan:  Patient was dose reduced to 280 mg ibrutinib however he still has thrombocytopenia less than 100,000.    In the light of his recent PET scan with no evidence of disease, will continue holding ibrutinib   Patient was advised to call our clinic if he were to have any constitutional symptoms   Will plan to follow-up in clinic in 10 weeks with repeat labs and PET-CT to discuss further treatment recommendations.      A total of  30 minutes were spent in review of records, interpretation of test, coordination of care, discussion and counseling with the patient.        Portions of the record may have been created with voice recognition software. Occasional wrong-word or sound-a-like substitutions may have occurred due to the inherent limitations of voice recognition software. Read the chart carefully and recognize, using context, where substitutions have occurred.

## 2023-10-13 ENCOUNTER — OFFICE VISIT (OUTPATIENT)
Dept: HEMATOLOGY/ONCOLOGY | Facility: CLINIC | Age: 83
End: 2023-10-13
Payer: MEDICARE

## 2023-10-13 VITALS
OXYGEN SATURATION: 97 % | HEART RATE: 64 BPM | TEMPERATURE: 97 F | WEIGHT: 179 LBS | HEIGHT: 64 IN | SYSTOLIC BLOOD PRESSURE: 128 MMHG | RESPIRATION RATE: 18 BRPM | DIASTOLIC BLOOD PRESSURE: 79 MMHG | BODY MASS INDEX: 30.56 KG/M2

## 2023-10-13 DIAGNOSIS — C83.13 MANTLE CELL LYMPHOMA OF INTRA-ABDOMINAL LYMPH NODES: Primary | ICD-10-CM

## 2023-10-13 PROCEDURE — 99214 PR OFFICE/OUTPT VISIT, EST, LEVL IV, 30-39 MIN: ICD-10-PCS | Mod: ,,, | Performed by: STUDENT IN AN ORGANIZED HEALTH CARE EDUCATION/TRAINING PROGRAM

## 2023-10-13 PROCEDURE — 99214 OFFICE O/P EST MOD 30 MIN: CPT | Mod: ,,, | Performed by: STUDENT IN AN ORGANIZED HEALTH CARE EDUCATION/TRAINING PROGRAM

## 2023-12-22 ENCOUNTER — OFFICE VISIT (OUTPATIENT)
Dept: HEMATOLOGY/ONCOLOGY | Facility: CLINIC | Age: 83
End: 2023-12-22
Payer: MEDICARE

## 2023-12-22 VITALS
TEMPERATURE: 98 F | SYSTOLIC BLOOD PRESSURE: 122 MMHG | OXYGEN SATURATION: 97 % | HEIGHT: 64 IN | HEART RATE: 68 BPM | WEIGHT: 174 LBS | RESPIRATION RATE: 18 BRPM | DIASTOLIC BLOOD PRESSURE: 79 MMHG | BODY MASS INDEX: 29.71 KG/M2

## 2023-12-22 DIAGNOSIS — Z51.11 ENCOUNTER FOR ANTINEOPLASTIC CHEMOTHERAPY: ICD-10-CM

## 2023-12-22 DIAGNOSIS — C83.13 MANTLE CELL LYMPHOMA OF INTRA-ABDOMINAL LYMPH NODES: Primary | ICD-10-CM

## 2023-12-22 PROCEDURE — 99215 OFFICE O/P EST HI 40 MIN: CPT | Mod: ,,, | Performed by: STUDENT IN AN ORGANIZED HEALTH CARE EDUCATION/TRAINING PROGRAM

## 2023-12-22 PROCEDURE — 99215 PR OFFICE/OUTPT VISIT, EST, LEVL V, 40-54 MIN: ICD-10-PCS | Mod: ,,, | Performed by: STUDENT IN AN ORGANIZED HEALTH CARE EDUCATION/TRAINING PROGRAM

## 2023-12-22 NOTE — PROGRESS NOTES
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     Treatment history    05/18/2022-10/26/2022:  6 cycles of bendamustine rituximab  December 2022:  Ibrutinib 560 mg q.day  03/16/2023-07/18/2023:  Ibrutinib 420 mg q.d.  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 He also had a single visit with Dr Snow in 2018 but never followed back.   Patient had a PET-CT as well as a bone marrow biopsy and repeat peripheral flow cytometry with a diagnosis of CLL/SLL and mantle cell lymphoma.  Patient had symptoms of fatigue and weight loss prior to his workup and diagnosis.  He was diagnosed with mantle cell lymphoma and CLL and started on treatment with bendamustine rituximab, with incomplete response following which he was started on ibrutinib.      Interval history     Today, 12/22/2023, patient denies any acute concerns today.  He denies any fevers, chills, drenching night sweats, decreased appetite or weight loss.  He continues to work intermittently.  He denies any symptoms of fatigue.    Labs:  12/18/2023 creatinine 0.85, albumin 3.4, calcium 9.4, LFTs unremarkable, WBC count 3.8, hemoglobin 14.2, .5, platelet count 130, ANC 2.94.  10/09/2023 creatinine 0.8, LFTs unremarkable, uric acid 6.2, , WBC count 3.1, hemoglobin 13.8, .7, platelet count 62879, ANC 2.16.  08/14/2023 CMP unremarkable, , uric acid 6.8, WBC count 4.23, hemoglobin 13.6, , platelet count [id number], ANC 2.87.  06/14/23:  wbc 3.26, hgb 13.9, plt 102, ANC 2.21 Cr 1.14 total bili 0.4 AST 26 ALT 23   05/31/23: wbc 3.69, hgb 12.9, plt 4,000, ANC  2.58  05/26/23: cr 1.11, alb 3.6, ca 9.3, LFTs WNL, , uric acid 6.7, mag 2.1, phosphorus 2.7  05/03/2023:  Creatinine 0.86, albumin 3.6, calcium 9.3, alkaline phosphatase 61, total bili 0.5, AST 19, ALT 16, magnesium 2.2, phosphorus 3.2, , uric acid 6.5, WBC count 2.6, hemoglobin 12.7, .3, platelet count 124, ANC 1.69.  04/12/2023:  Creatinine 0.83, albumin 3.5, calcium 9.2, , uric acid 3.8, LFTs within normal limits, magnesium 2.0, phosphorus 2.6, WBC count 2.7, hemoglobin 12.6, ANC 2000, ALC 0.28.  03/28/23: CMP unremarkable except ca 10.2, mag 2.2, phosphorus 3.0, , uric acid 4.2, wbc 3.43, hgb 13.3, plt 78, ANC 2.48  03/10/23: CMP unremarkable, wbc 2.57, hgb 12.8, plt 87, ANC 1.85  03/01/23: cr 0.96, ca 9.2, alb 3.4, AST 35, ALT 24, , mag 1.9, phosphorus 3.4, uric acid 3.8, wbc 3.23, hgb 12.5, plt 74,000, ANC 2.39  02/01/23: Cr 0.92, ca 9.6, alb 3.6, LFTs WNL, wbc 3.08, hgb 12.6, plt 102, ANC 2.15, ALC 0.25  01/20/23; cr 0.97, alb 3.5, LFTs WNL, wbc 3.06, hgb 12.8, plt 64, ANC 2.25  11/22/22: cr 0.86, alb 3.7, LFTs WNL, , uric acid 5.1, wbc 2.16, hgb 12.8, plt 76, mcv 107.8, ALC 0.35, ANC 1.11  10/25/2022:  Creatinine 0.89, albumin 3.5, LFTs within normal limits, , uric acid 3.6.  WBC count 3.15, hemoglobin 12.7, .3, platelet count 125 1000, ANC 2.28.  10/11/2022:  Creatinine 1.04, albumin 3.6, LFTs within normal limits, magnesium 2.0, phosphorus 2.7, , uric acid 3.2, WBC 2.4, hemoglobin 13.4, .5, platelet count 06703.  ANC 1.77.  09/13/2022:  Creatinine 0.80, albumin 3.7, LFTs within normal limits, , uric acid 4.0, WBC count 2.37, hemoglobin 13.2, , platelet count 52158, ANC 1.45.  08/16/2022:  Creatinine 0.81, albumin 3.8, calcium 9.2, total bili 0.5, LFTs within normal limits, uric acid 3.3, , WBC 3.45, hemoglobin 13.7, .8, platelet count 75658, ANC 2.06.  07/19/2022:  WBC 3.75, hemoglobin 13.1, .5,  platelet count 21565, ANC 2.32, uric acid 4.1, , creatinine 1.09, albumin 3.5, calcium 9.6, LFTs within normal limits.  2022 WBC at 2.58 bed neutrophil adequate at 55%.  Hemoglobin 12.7, hematocrit 39.4.  2022:  Creatinine 0.99, calcium 9.1, , uric acid 3.7, phosphorus 3.3, potassium 4.5, WBC count 2.58, hemoglobin 12.7, platelet count 33926, ANC 1.44.  2022:  Potassium 4.9, sodium 140, creatinine 0.87, , uric acid 6.6.  2022:  Creatinine 1.03, albumin 3.3, ,  WBC count 10.32, hemoglobin 12.4, platelet count 135 1000.   2022:  Creatinine 0.93, albumin 3.6, total protein 6.4, , potassium 5.2, WBC count 9.6, hemoglobin 11.4, .4, platelet count 92.   4/ Cr 0.89 Alb 3.4 TP 6.4 Ca 9.4  TSH 2.0 Iron 58n TIBC 285 Ferritin 48 Folate 9.9 B12 426 Hep B s ag neg Uric acid 7.8 Hep C ab neg WBC 9.79 RBC 3.77 Hg 11.8 .5 RDW 13.4 PLT 91 ANC 2.79 ALC 6.36 Direct Ramos neg b2 micro 7.5 Copper 135 Hapto 225 Hep B core ab neg SPEP w/ ARABELLA normal Abs kappa 32 Abs lambda 124 SFLC ratio 0.26  3/18/22 Cr 1 AST 27 ALT 12 AlkPhos 87 Alb 4.1 TP 6.5 Ca 9.8 WBC 9.4 RBC 3.92 Hg 12.2 MCV 96 RDW 15.1  ALC 5.8 ANC 3  5/15/18 WBC 14.71 RBC 4.42 Hg 14.3 MCV 98.6 RDW 13  ANC 2.91 ALC 10.13 AMC 1.29    Imagin2023 PET-CT:  Interval development of mild-to-moderate uptake in multiple small lymph nodes in the chest and abdomen which is somewhat concerning given the history of lymphoma, continued close monitoring is recommended.  Interval development of nonspecific mild diffuse splenic uptake clinical recommendation is recommended.  Interval development of subtle bilateral ground-glass opacities with mild uptake, which is being favored being inflammatory in etiology, attention on follow-up    2023 PET-CT:  Stable PET-CT without evidence of metabolically active residual or recurrent lymphoma.    2023:  PET-CT-interval resolution  of moderate uptake in right axillary lymph node with no current PET evidence of metabolically active residual or recurrent lymphoma.    11/7/22 PET CT: Stable right axillary lymph node with mild-to-moderate uptake, representing residual metabolically active tumor. No new suspicious uptake noted     08/25/2022 PET-CT:  Excellent response to therapy since her last exam with interval resolution or near complete resolution of multiple hypermetabolic lymph nodes above and below the diaphragm.  There is residual mild-to-moderate uptake in the right axillary lymph node, Deauville score 4  Interval resolution of diffuse uptake in spleen which is also decreased in size.  No new suspicious uptake.     05/10/22:-  right lower extremity Doppler negative for DVT       4/13/22 PET CT: multiple hypermetabolic LNs b/l neck. Right submandibular LN .7x1.7cm, SUV 7.8. Left submandibular LN 2.3x1.7cm SUV 7.2. Multiple hypermetabolic LNs in chest. Right axillary node 3.3x2cm SUV 8.1. Hypermetabolic left axillary LN SUV 6.4, 1.9x3.2cm. Hypermetabolic right paratracheal LN 2.4x1.6cm SUV 4.9. Right pulmonary hilum SUV 2.7. Subcarinal LN SUV 3.3, 1.2x3.3cm. Spleen enlarged at 15cm and demonstrates heterogeneous mild to moderate uptake SUV 5.2. Moderately intense activity in stomach and proximal duodenum with no degree of focality or discrete mass on CT. Multiple hypermetabolic LNs in abd and pelvis. Left periaortic LN SUV 6.8, 4x3.5cm. Large hypermetabolic mesenteric mass [id number] SUV 8.5. Aortocaval LN 3.5x3.8cm SUV 5.1. Hypermetabolic mass in left pelvis 5.5x7cm SUV 7. Left external iliac LN SUV 6 2.2x2.1cm. Extensive hypermetabolism anteriorly and laterally in left abdomen, associated with soft tissue thickening that is difficult to separate from bowel loops. B/l inguinal LNs up to 2.9x3.9cm, SUV 6.6    3/29/22 CT neck w/ and w/o contrast: widespread adenopathy identified in parotic glands bilateral, submandibular space, anterior and  posterior cervical chains, superior mediastinum and supraclavicular and infraclavicular spaces of the base of neck/upper chest in appearance strongly suspicious for lymphoma. Multiple subcutaneous masses are also identified in lower occipital scalp and upper neck similar to lymphadenopathy. Biopsy of prominent subcutaneous mass in right upper neck is recommended and should present the easiest location for tissue sampling    Path:  5/23/18 peripheral blood flow cytometry: CD5+ B cell population. No abnormal T cell or myeloid cell populations. CD19+, CD20+, CD5+, CD23+, CD11c+ surface kappa light chain+, CD38-, CD22+ (dim/mod), CD43+, FMC7-. Consistent with CLL     04/26/2022:  Bone marrow biopsy-marrow -trilineage hematopoiesis with 10-20% B-cell infiltrate, C5 positive B-cell population with immunophenotype typical for CLL.  CLL fish within normal limits, heavy chain mutation status cannot be determined.  6q, chromosome 12, chromosome 13, chromosome 11, chromosome 17 and t( 11;14) abnormalities not detected.     05/06/2022 left axillary lymph node core biopsy:  Mantle cell lymphoma with a subset consistent with CLL/SLL., CD5 positive monoclonal B-cell population is identified, approximately 91% of total cells, immuno phenotype is nonspecific and may be seen in CLL, marginal zone lymphoma, or large cell lymphoma.  A 2nd small monoclonal B-cell population approximately 2% of the cells identified with an immunophenotype consistent with CLL.  New genomic consultation lymphoma cells positive for CD 20, CD 5, cyclin D1 and BCL2 with a TI 67 of 10%.  There is no significant staining for CD10, BCL6.  However workup consistent with diagnosis of mantle cell lymphoma.        Past Medical History:   Diagnosis Date    Cataract     Lymphoma        Past Surgical History:   Procedure Laterality Date    HERNIA REPAIR         Family History   Problem Relation Age of Onset    Kidney disease Mother     Colon cancer Father      Colon cancer Sister     Breast cancer Sister     Uterine cancer Sister        Social History     Socioeconomic History    Marital status:    Tobacco Use    Smoking status: Never    Smokeless tobacco: Never   Substance and Sexual Activity    Alcohol use: Not Currently    Drug use: Never       Current Outpatient Medications   Medication Sig Dispense Refill    allopurinoL (ZYLOPRIM) 300 MG tablet Take 1 tablet (300 mg total) by mouth once daily. 30 tablet 5    aspirin (ECOTRIN) 81 MG EC tablet Take 81 mg by mouth once daily.      carvediloL (COREG) 12.5 MG tablet Take 1 tablet by mouth 2 (two) times a day.      EScitalopram oxalate (LEXAPRO) 10 MG tablet Take 1 tablet by mouth once daily.      ezetimibe (ZETIA) 10 mg tablet Take 10 mg by mouth once daily.      ondansetron (ZOFRAN) 8 MG tablet Take 1 tablet (8 mg total) by mouth every 8 (eight) hours as needed for Nausea. 30 tablet 1    pregabalin (LYRICA) 50 MG capsule pregabalin 50 mg capsule   TAKE 1 CAPSULE BY MOUTH TWICE DAILY      rosuvastatin (CRESTOR) 20 MG tablet Take 20 mg by mouth once daily.      sulfamethoxazole-trimethoprim 800-160mg (BACTRIM DS) 800-160 mg Tab Take 1 tablet by mouth every Mon, Wed, Fri. 30 tablet 3    traMADoL (ULTRAM) 50 mg tablet Take 1 tablet (50 mg total) by mouth every 6 (six) hours as needed for Pain. 30 tablet 0    valACYclovir (VALTREX) 500 MG tablet Take 1 tablet (500 mg total) by mouth 2 (two) times daily. 60 tablet 3     No current facility-administered medications for this visit.       Review of patient's allergies indicates:   Allergen Reactions    Pregabalin           ROS  CONSTITUTIONAL: no fevers, no chills,  No weight loss, no  fatigue, no weakness  HEMATOLOGIC: no abnormal bleeding, no abnormal bruising, no drenching night sweats  ONCOLOGIC: no new masses or lumps  HEENT: no vision loss, no tinnitus or hearing loss, no nose bleeding, no dysphagia, no odynophagia  CVS: no chest pain, no palpitations, no dyspnea on  exertion  RESP: no shortness of breath, no hemoptysis, no cough  GI: no nausea, no vomiting, no diarrhea, no constipation, no melena, no hematochezia, no hematemesis, no abdominal pain, no increase in abdominal girth  : no dysuria, no hematuria, no hesitancy, no scrotal swelling, no discharge  INTEGUMENT: no rashes, no abnormal bruising, no nail pitting, no hyperpigmentation  NEURO: no falls, no memory loss, no paresthesias or dysesthesias, no urofecal incontinence or retention, no loss of strength on any extremity  MSK: no back pain, no new joint pain, no joint swelling  PSYCH: no suicidal or homicidal ideation, no depression, no insomnia, no anhedonia  ENDOCRINE: no heat or cold intolerance, no polyuria, no polydipsia          Physical exam     Vitals:    12/22/23 1002   BP: 122/79   Pulse: 68   Resp: 18   Temp: 98.2 °F (36.8 °C)       GEN: AAOx3, NAD  HEENT: PERRLA, EOMI, edentulous, no oral ulcers  NECK:  Supple, full range of motion  LYMPH:  No axillary, supra clavicle or cervical adenopathy appreciated on today's exam.  CVS: s1s2 RRR, no M/R/G, port in place in right chest wall without surrounding edema or erythema.  RESP: CTA b/l, no crackles, no wheezes or rhonchi, well-healed herpes zoster rash.  ABD: soft, NT, ND, BS+, no hepatomegaly, mild splenomegaly,   EXT: no deformities, right lower extremity edema.  SKIN:  No rash,.  No evidence of petechia.  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  Positive for CLL.  PET-CT with evidence of bulky lymphadenopathy, largest measuring 14 cm, spleen measuring 15 cm.  Discussed with patient and his family the diagnosis, natural history and treatment options.  Discussed the risk for TLS given bulky lymphadenopathy, perforation given  lymphadenopathy around the bowel loops as well as declining ECOG given his advanced age.  Continuet PJP prophylaxis with Bactrim Monday Wednesday Friday and valacyclovir with treatment.  Continue allopurinol 300 mg q.day for TLS prophylaxis.  Patient is status post cycle 1 of bendamustine and rituximab on 05/18/2022.  Patient has bendamustine was reduced by 25% and rituximab was given 1 week apart to reduce the risk of perforation.  S/p cycle 2 and cycle 3 without significant side effects, dosed for bendamustine increased to 100%.  Discussed with patient interim PET-CT with excellent response to treatment however with residual hypermetabolism in right axillary lymph node with a Deauville score of 4.  Given his excellent tolerance of treatment, will proceed to complete 6 cycles of BR in the setting of above PET-CT report.  Discussed alternate treatment plan to proceed with ibrutinib given partial response.  Patient elected to proceed with another 2 cycles of BR with plans to repeat PET-CT after completion of treatment to assess treatment response.  Given incomplete response on PET-CT after completion of 6 hours MS, he was started on ibrutinib in second-line initially at 560 mg q.day.    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      Based on available information, likely SLL/CLL with some constitutional symptoms including weight loss, fatigue and night sweats. Adenopathy easily palpable above and below the diaphragm. Mild thrombocytopenia w/ PLT count ~120k and very mild anemia with Hg ~12 g/dl. No hypercalcemia, no evidence of immune paresis  Biopsies 4/11/22 with Dr Brannon were FNAs and non diagnostic for CLL.  I do not know what IHC was obtained or whether pathologist knew what the potential diagnosis was as report only says negative for carcinoma. Will discuss with Dr Lan to add CD5, CD10, CD19, CD20 etc if enough tissue  Regardless, will also refer for axillary LN biopsy, either excisional or at the very least core. Referral sent today  PET CT 4/2022 findings are noted. Given his advanced age he was not refer for EGD. Masses in abdomen are likely matted gurinder conglomerates and I do not think it would be worth it to attempt a biopsy of those  CLL FISH  Negative , IGHV mutation status  Hep B and C negative  Allopurinol 300mg daily with PO hydration prior to initiation of any therapy  Given concomitant diagnosis of CLL and mantle cell lymphoma will plan to initiate treatment for mantle cell lymphoma and CLL with bendamustine rituximab.  Will consider BTK inhibitors if patient is intolerant to bendamustine/rituximab in the second-line      Plan:  Reviewed PET-CT with concerns for disease recurrence.    Plan to initiate ibrutinib 140 mg daily on 01/03/2023, orders sent to specialty pharmacy today   Dose reduced due to hematological toxicity with 280 mg in the past  Plan for blood work on 01/02/2023 prior to initiating ibrutinib   Plan to follow-up in 2 weeks after initiating treatment to assess treatment tolerance, blood work the day prior.        Portions of the record may have been created with voice recognition software. Occasional wrong-word or sound-a-like substitutions may have occurred due to the inherent limitations of voice recognition software. Read the chart carefully and recognize, using context, where substitutions have occurred.

## 2024-02-16 ENCOUNTER — OFFICE VISIT (OUTPATIENT)
Dept: HEMATOLOGY/ONCOLOGY | Facility: CLINIC | Age: 84
End: 2024-02-16
Payer: MEDICARE

## 2024-02-16 VITALS
RESPIRATION RATE: 18 BRPM | HEIGHT: 64 IN | SYSTOLIC BLOOD PRESSURE: 99 MMHG | BODY MASS INDEX: 29.85 KG/M2 | HEART RATE: 59 BPM | TEMPERATURE: 98 F | OXYGEN SATURATION: 96 % | DIASTOLIC BLOOD PRESSURE: 64 MMHG | WEIGHT: 174.88 LBS

## 2024-02-16 DIAGNOSIS — C83.13 MANTLE CELL LYMPHOMA OF INTRA-ABDOMINAL LYMPH NODES: ICD-10-CM

## 2024-02-16 DIAGNOSIS — Z51.11 ENCOUNTER FOR ANTINEOPLASTIC CHEMOTHERAPY: Primary | ICD-10-CM

## 2024-02-16 PROCEDURE — 99215 OFFICE O/P EST HI 40 MIN: CPT | Mod: ,,, | Performed by: STUDENT IN AN ORGANIZED HEALTH CARE EDUCATION/TRAINING PROGRAM

## 2024-02-16 NOTE — PROGRESS NOTES
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     Treatment history    05/18/2022-10/26/2022:  6 cycles of bendamustine rituximab  December 2022:  Ibrutinib 560 mg q.day  03/16/2023-07/18/2023:  Ibrutinib 420 mg q.d.  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 He also had a single visit with Dr Snow in 2018 but never followed back.   Patient had a PET-CT as well as a bone marrow biopsy and repeat peripheral flow cytometry with a diagnosis of CLL/SLL and mantle cell lymphoma.  Patient had symptoms of fatigue and weight loss prior to his workup and diagnosis.  He was diagnosed with mantle cell lymphoma and CLL and started on treatment with bendamustine rituximab, with incomplete response following which he was started on ibrutinib.      Interval history     Today, 02/16/2024, patient denies any acute concerns.  He does report mild fatigue which is new since his last follow-up visit with us.  He continues to work intermittently.  He denies any fevers, chills, drenching night sweats, decreased appetite or weight loss.  He reports compliance with ibrutinib and is tolerating it well.    Labs:  02/15/2024:  Creatinine 1.08, albumin 3.5, calcium 9.6, alkaline phosphatase 95, total bilirubin 0.5, AST 40, ALT 49, , WBC count 3.47, hemoglobin 14.2, .7, platelet count 61618, ANC 2.18.  12/18/2023 creatinine 0.85, albumin 3.4, calcium 9.4, LFTs unremarkable, WBC count 3.8, hemoglobin 14.2, .5, platelet count 130, ANC 2.94.  10/09/2023 creatinine 0.8, LFTs unremarkable, uric acid 6.2, , WBC count 3.1, hemoglobin 13.8, MCV  102.7, platelet count 41644, ANC 2.16.  08/14/2023 CMP unremarkable, , uric acid 6.8, WBC count 4.23, hemoglobin 13.6, , platelet count [id number], ANC 2.87.  06/14/23:  wbc 3.26, hgb 13.9, plt 102, ANC 2.21 Cr 1.14 total bili 0.4 AST 26 ALT 23   05/31/23: wbc 3.69, hgb 12.9, plt 4,000, ANC 2.58  05/26/23: cr 1.11, alb 3.6, ca 9.3, LFTs WNL, , uric acid 6.7, mag 2.1, phosphorus 2.7  05/03/2023:  Creatinine 0.86, albumin 3.6, calcium 9.3, alkaline phosphatase 61, total bili 0.5, AST 19, ALT 16, magnesium 2.2, phosphorus 3.2, , uric acid 6.5, WBC count 2.6, hemoglobin 12.7, .3, platelet count 124, ANC 1.69.  04/12/2023:  Creatinine 0.83, albumin 3.5, calcium 9.2, , uric acid 3.8, LFTs within normal limits, magnesium 2.0, phosphorus 2.6, WBC count 2.7, hemoglobin 12.6, ANC 2000, ALC 0.28.  03/28/23: CMP unremarkable except ca 10.2, mag 2.2, phosphorus 3.0, , uric acid 4.2, wbc 3.43, hgb 13.3, plt 78, ANC 2.48  03/10/23: CMP unremarkable, wbc 2.57, hgb 12.8, plt 87, ANC 1.85  03/01/23: cr 0.96, ca 9.2, alb 3.4, AST 35, ALT 24, , mag 1.9, phosphorus 3.4, uric acid 3.8, wbc 3.23, hgb 12.5, plt 74,000, ANC 2.39  02/01/23: Cr 0.92, ca 9.6, alb 3.6, LFTs WNL, wbc 3.08, hgb 12.6, plt 102, ANC 2.15, ALC 0.25  01/20/23; cr 0.97, alb 3.5, LFTs WNL, wbc 3.06, hgb 12.8, plt 64, ANC 2.25  11/22/22: cr 0.86, alb 3.7, LFTs WNL, , uric acid 5.1, wbc 2.16, hgb 12.8, plt 76, mcv 107.8, ALC 0.35, ANC 1.11  10/25/2022:  Creatinine 0.89, albumin 3.5, LFTs within normal limits, , uric acid 3.6.  WBC count 3.15, hemoglobin 12.7, .3, platelet count 125 1000, ANC 2.28.  10/11/2022:  Creatinine 1.04, albumin 3.6, LFTs within normal limits, magnesium 2.0, phosphorus 2.7, , uric acid 3.2, WBC 2.4, hemoglobin 13.4, .5, platelet count 98427.  ANC 1.77.  09/13/2022:  Creatinine 0.80, albumin 3.7, LFTs within normal limits, , uric acid 4.0, WBC count 2.37,  hemoglobin 13.2, , platelet count 94252, ANC 1.45.  2022:  Creatinine 0.81, albumin 3.8, calcium 9.2, total bili 0.5, LFTs within normal limits, uric acid 3.3, , WBC 3.45, hemoglobin 13.7, .8, platelet count 97832, ANC 2.06.  2022:  WBC 3.75, hemoglobin 13.1, .5, platelet count 03590, ANC 2.32, uric acid 4.1, , creatinine 1.09, albumin 3.5, calcium 9.6, LFTs within normal limits.  2022 WBC at 2.58 bed neutrophil adequate at 55%.  Hemoglobin 12.7, hematocrit 39.4.  2022:  Creatinine 0.99, calcium 9.1, , uric acid 3.7, phosphorus 3.3, potassium 4.5, WBC count 2.58, hemoglobin 12.7, platelet count 91338, ANC 1.44.  2022:  Potassium 4.9, sodium 140, creatinine 0.87, , uric acid 6.6.  2022:  Creatinine 1.03, albumin 3.3, ,  WBC count 10.32, hemoglobin 12.4, platelet count 135 1000.   2022:  Creatinine 0.93, albumin 3.6, total protein 6.4, , potassium 5.2, WBC count 9.6, hemoglobin 11.4, .4, platelet count 92.   4 Cr 0.89 Alb 3.4 TP 6.4 Ca 9.4  TSH 2.0 Iron 58n TIBC 285 Ferritin 48 Folate 9.9 B12 426 Hep B s ag neg Uric acid 7.8 Hep C ab neg WBC 9.79 RBC 3.77 Hg 11.8 .5 RDW 13.4 PLT 91 ANC 2.79 ALC 6.36 Direct Ramos neg b2 micro 7.5 Copper 135 Hapto 225 Hep B core ab neg SPEP w/ ARABELLA normal Abs kappa 32 Abs lambda 124 SFLC ratio 0.26  3/18/22 Cr 1 AST 27 ALT 12 AlkPhos 87 Alb 4.1 TP 6.5 Ca 9.8 WBC 9.4 RBC 3.92 Hg 12.2 MCV 96 RDW 15.1  ALC 5.8 ANC 3  5/15/18 WBC 14.71 RBC 4.42 Hg 14.3 MCV 98.6 RDW 13  ANC 2.91 ALC 10.13 AMC 1.29    Imagin2023 PET-CT:  Interval development of mild-to-moderate uptake in multiple small lymph nodes in the chest and abdomen which is somewhat concerning given the history of lymphoma, continued close monitoring is recommended.  Interval development of nonspecific mild diffuse splenic uptake clinical recommendation is recommended.  Interval  development of subtle bilateral ground-glass opacities with mild uptake, which is being favored being inflammatory in etiology, attention on follow-up    08/17/2023 PET-CT:  Stable PET-CT without evidence of metabolically active residual or recurrent lymphoma.    04/12/2023:  PET-CT-interval resolution of moderate uptake in right axillary lymph node with no current PET evidence of metabolically active residual or recurrent lymphoma.    11/7/22 PET CT: Stable right axillary lymph node with mild-to-moderate uptake, representing residual metabolically active tumor. No new suspicious uptake noted     08/25/2022 PET-CT:  Excellent response to therapy since her last exam with interval resolution or near complete resolution of multiple hypermetabolic lymph nodes above and below the diaphragm.  There is residual mild-to-moderate uptake in the right axillary lymph node, Deauville score 4  Interval resolution of diffuse uptake in spleen which is also decreased in size.  No new suspicious uptake.     05/10/22:-  right lower extremity Doppler negative for DVT       4/13/22 PET CT: multiple hypermetabolic LNs b/l neck. Right submandibular LN .7x1.7cm, SUV 7.8. Left submandibular LN 2.3x1.7cm SUV 7.2. Multiple hypermetabolic LNs in chest. Right axillary node 3.3x2cm SUV 8.1. Hypermetabolic left axillary LN SUV 6.4, 1.9x3.2cm. Hypermetabolic right paratracheal LN 2.4x1.6cm SUV 4.9. Right pulmonary hilum SUV 2.7. Subcarinal LN SUV 3.3, 1.2x3.3cm. Spleen enlarged at 15cm and demonstrates heterogeneous mild to moderate uptake SUV 5.2. Moderately intense activity in stomach and proximal duodenum with no degree of focality or discrete mass on CT. Multiple hypermetabolic LNs in abd and pelvis. Left periaortic LN SUV 6.8, 4x3.5cm. Large hypermetabolic mesenteric mass [id number] SUV 8.5. Aortocaval LN 3.5x3.8cm SUV 5.1. Hypermetabolic mass in left pelvis 5.5x7cm SUV 7. Left external iliac LN SUV 6 2.2x2.1cm. Extensive hypermetabolism  anteriorly and laterally in left abdomen, associated with soft tissue thickening that is difficult to separate from bowel loops. B/l inguinal LNs up to 2.9x3.9cm, SUV 6.6    3/29/22 CT neck w/ and w/o contrast: widespread adenopathy identified in parotic glands bilateral, submandibular space, anterior and posterior cervical chains, superior mediastinum and supraclavicular and infraclavicular spaces of the base of neck/upper chest in appearance strongly suspicious for lymphoma. Multiple subcutaneous masses are also identified in lower occipital scalp and upper neck similar to lymphadenopathy. Biopsy of prominent subcutaneous mass in right upper neck is recommended and should present the easiest location for tissue sampling    Path:  5/23/18 peripheral blood flow cytometry: CD5+ B cell population. No abnormal T cell or myeloid cell populations. CD19+, CD20+, CD5+, CD23+, CD11c+ surface kappa light chain+, CD38-, CD22+ (dim/mod), CD43+, FMC7-. Consistent with CLL     04/26/2022:  Bone marrow biopsy-marrow -trilineage hematopoiesis with 10-20% B-cell infiltrate, C5 positive B-cell population with immunophenotype typical for CLL.  CLL fish within normal limits, heavy chain mutation status cannot be determined.  6q, chromosome 12, chromosome 13, chromosome 11, chromosome 17 and t( 11;14) abnormalities not detected.     05/06/2022 left axillary lymph node core biopsy:  Mantle cell lymphoma with a subset consistent with CLL/SLL., CD5 positive monoclonal B-cell population is identified, approximately 91% of total cells, immuno phenotype is nonspecific and may be seen in CLL, marginal zone lymphoma, or large cell lymphoma.  A 2nd small monoclonal B-cell population approximately 2% of the cells identified with an immunophenotype consistent with CLL.  New genomic consultation lymphoma cells positive for CD 20, CD 5, cyclin D1 and BCL2 with a TI 67 of 10%.  There is no significant staining for CD10, BCL6.  However workup  consistent with diagnosis of mantle cell lymphoma.        Past Medical History:   Diagnosis Date    Cataract     Lymphoma        Past Surgical History:   Procedure Laterality Date    HERNIA REPAIR         Family History   Problem Relation Age of Onset    Kidney disease Mother     Colon cancer Father     Colon cancer Sister     Breast cancer Sister     Uterine cancer Sister        Social History     Socioeconomic History    Marital status:    Tobacco Use    Smoking status: Never    Smokeless tobacco: Never   Substance and Sexual Activity    Alcohol use: Not Currently    Drug use: Never       Current Outpatient Medications   Medication Sig Dispense Refill    allopurinoL (ZYLOPRIM) 300 MG tablet Take 1 tablet (300 mg total) by mouth once daily. 30 tablet 5    aspirin (ECOTRIN) 81 MG EC tablet Take 81 mg by mouth once daily.      carvediloL (COREG) 12.5 MG tablet Take 1 tablet by mouth 2 (two) times a day.      EScitalopram oxalate (LEXAPRO) 10 MG tablet Take 1 tablet by mouth once daily.      ezetimibe (ZETIA) 10 mg tablet Take 10 mg by mouth once daily.      ibrutinib (IMBRUVICA) 140 mg Cap Take 1 tablet (140 mg) by mouth once daily. 30 capsule 3    ondansetron (ZOFRAN) 8 MG tablet Take 1 tablet (8 mg total) by mouth every 8 (eight) hours as needed for Nausea. 30 tablet 1    pregabalin (LYRICA) 50 MG capsule pregabalin 50 mg capsule   TAKE 1 CAPSULE BY MOUTH TWICE DAILY      rosuvastatin (CRESTOR) 20 MG tablet Take 20 mg by mouth once daily.      sulfamethoxazole-trimethoprim 800-160mg (BACTRIM DS) 800-160 mg Tab Take 1 tablet by mouth every Mon, Wed, Fri. 30 tablet 3    traMADoL (ULTRAM) 50 mg tablet Take 1 tablet (50 mg total) by mouth every 6 (six) hours as needed for Pain. 30 tablet 0    valACYclovir (VALTREX) 500 MG tablet Take 1 tablet (500 mg total) by mouth 2 (two) times daily. 60 tablet 3     No current facility-administered medications for this visit.       Review of patient's allergies indicates:    Allergen Reactions    Pregabalin           ROS  CONSTITUTIONAL: no fevers, no chills,  No weight loss, no  fatigue, no weakness  HEMATOLOGIC: no abnormal bleeding, no abnormal bruising, no drenching night sweats  ONCOLOGIC: no new masses or lumps  HEENT: no vision loss, no tinnitus or hearing loss, no nose bleeding, no dysphagia, no odynophagia  CVS: no chest pain, no palpitations, no dyspnea on exertion  RESP: no shortness of breath, no hemoptysis, no cough  GI: no nausea, no vomiting, no diarrhea, no constipation, no melena, no hematochezia, no hematemesis, no abdominal pain, no increase in abdominal girth  : no dysuria, no hematuria, no hesitancy, no scrotal swelling, no discharge  INTEGUMENT: no rashes, no abnormal bruising, no nail pitting, no hyperpigmentation  NEURO: no falls, no memory loss, no paresthesias or dysesthesias, no urofecal incontinence or retention, no loss of strength on any extremity  MSK: no back pain, no new joint pain, no joint swelling  PSYCH: no suicidal or homicidal ideation, no depression, no insomnia, no anhedonia  ENDOCRINE: no heat or cold intolerance, no polyuria, no polydipsia          Physical exam     Vitals:    02/16/24 1019   BP: 99/64   Pulse: (!) 59   Resp: 18   Temp: 97.7 °F (36.5 °C)       GEN: AAOx3, NAD  HEENT: PERRLA, EOMI, edentulous, no oral ulcers  NECK:  Supple, full range of motion  LYMPH:  No axillary, supra clavicle or cervical adenopathy appreciated on today's exam.  CVS: s1s2 RRR, no M/R/G, port in place in right chest wall without surrounding edema or erythema.  RESP: CTA b/l, no crackles, no wheezes or rhonchi, well-healed herpes zoster rash.  ABD: soft, NT, ND, BS+, no hepatomegaly, mild splenomegaly,   EXT: no deformities, right lower extremity edema.  SKIN:  No rash,.  No evidence of petechia.  NEURO: normal mentation, strength 5/5 on all 4 extremities, no sensory deficits       ASSESSMENT:     # Mantle cell lymphoma, [place] stage III or IV based on  PET/CT  Ki 67 10%, MIPI 6.7, high risk, SOX11+  Bone marrow biopsy negative for evidence of mantle cell lymphoma.  Positive for CLL.  PET-CT with evidence of bulky lymphadenopathy, largest measuring 14 cm, spleen measuring 15 cm.  Discussed with patient and his family the diagnosis, natural history and treatment options.  Discussed the risk for TLS given bulky lymphadenopathy, perforation given lymphadenopathy around the bowel loops as well as declining ECOG given his advanced age.  Continuet PJP prophylaxis with Bactrim Monday Wednesday Friday and valacyclovir with treatment.  Continue allopurinol 300 mg q.day for TLS prophylaxis.  Patient is status post cycle 1 of bendamustine and rituximab on 05/18/2022.  Patient has bendamustine was reduced by 25% and rituximab was given 1 week apart to reduce the risk of perforation.  S/p cycle 2 and cycle 3 without significant side effects, dosed for bendamustine increased to 100%.  Discussed with patient interim PET-CT with excellent response to treatment however with residual hypermetabolism in right axillary lymph node with a Deauville score of 4.  Given his excellent tolerance of treatment, will proceed to complete 6 cycles of BR in the setting of above PET-CT report.  Discussed alternate treatment plan to proceed with ibrutinib given partial response.  Patient elected to proceed with another 2 cycles of BR with plans to repeat PET-CT after completion of treatment to assess treatment response.  Given incomplete response on PET-CT after completion of 6 hours ID, he was started on ibrutinib in second-line initially at 560 mg q.day.    Given patient's thrombocytopenia, dose was reduced to 420 mg in March 2023  PET-CT in April 2023 with excellent response with resolution of axillary adenopathy  PET-CT in August 2023 with no evidence of disease  PET-CT in December 2023 with findings concerning for disease recurrence  Patient was started back on ibrutinib 140 mg q.day in the  last week of January.         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      Based on available information, likely SLL/CLL with some constitutional symptoms including weight loss, fatigue and night sweats. Adenopathy easily palpable above and below the diaphragm. Mild thrombocytopenia w/ PLT count ~120k and very mild anemia with Hg ~12 g/dl. No hypercalcemia, no evidence of immune paresis  Biopsies 4/11/22 with Dr Brannon were FNAs and non diagnostic for CLL. I do not know what IHC was obtained or whether pathologist knew what the potential diagnosis was as report only says negative for carcinoma. Will discuss with Dr Lan to add CD5, CD10, CD19, CD20 etc if enough tissue  Regardless, will also refer for axillary LN biopsy, either excisional or at the very least core. Referral sent today  PET CT 4/2022 findings are noted. Given his advanced age he was not refer for EGD. Masses in abdomen are likely matted gurinder conglomerates and I do not think it would be worth it to attempt a biopsy of those  CLL FISH  Negative , IGHV mutation status  Hep B and C negative  Allopurinol 300mg daily with PO hydration prior to initiation of any therapy  Given concomitant diagnosis of CLL and mantle cell lymphoma will plan to initiate treatment for mantle cell lymphoma and CLL with bendamustine rituximab.  Will consider BTK inhibitors if patient is intolerant to bendamustine/rituximab in the second-line      Plan:  Reviewed labs, noted thrombocytopenia less than 100,000   Discussed with patient the plan to hold ibrutinib for 1 week and restarted at the same dose then   Will plan for treatment days 1-21 every 28 days given thrombocytopenia at the lowest dose reduction  Will plan to follow-up with patient in 4 weeks prior to his next cycle of treatment, labs day prior to assess treatment tolerance.    Will plan for PET-CT after 3 months of treatment to assess response.        Portions of the record may have been  created with voice recognition software. Occasional wrong-word or sound-a-like substitutions may have occurred due to the inherent limitations of voice recognition software. Read the chart carefully and recognize, using context, where substitutions have occurred.

## 2024-02-19 DIAGNOSIS — Z51.11 ENCOUNTER FOR ANTINEOPLASTIC CHEMOTHERAPY: ICD-10-CM

## 2024-02-19 DIAGNOSIS — C83.13 MANTLE CELL LYMPHOMA OF INTRA-ABDOMINAL LYMPH NODES: Primary | ICD-10-CM

## 2024-03-15 ENCOUNTER — OFFICE VISIT (OUTPATIENT)
Dept: HEMATOLOGY/ONCOLOGY | Facility: CLINIC | Age: 84
End: 2024-03-15
Payer: MEDICARE

## 2024-03-15 VITALS
HEART RATE: 64 BPM | BODY MASS INDEX: 30.28 KG/M2 | RESPIRATION RATE: 18 BRPM | DIASTOLIC BLOOD PRESSURE: 73 MMHG | HEIGHT: 64 IN | WEIGHT: 177.38 LBS | OXYGEN SATURATION: 95 % | TEMPERATURE: 98 F | SYSTOLIC BLOOD PRESSURE: 117 MMHG

## 2024-03-15 DIAGNOSIS — C83.13 MANTLE CELL LYMPHOMA OF INTRA-ABDOMINAL LYMPH NODES: Primary | ICD-10-CM

## 2024-03-15 DIAGNOSIS — D69.6 THROMBOCYTOPENIA: ICD-10-CM

## 2024-03-15 DIAGNOSIS — Z51.11 ENCOUNTER FOR ANTINEOPLASTIC CHEMOTHERAPY: ICD-10-CM

## 2024-03-15 PROCEDURE — 99215 OFFICE O/P EST HI 40 MIN: CPT | Mod: ,,, | Performed by: STUDENT IN AN ORGANIZED HEALTH CARE EDUCATION/TRAINING PROGRAM

## 2024-03-15 NOTE — PROGRESS NOTES
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     Treatment history    05/18/2022-10/26/2022:  6 cycles of bendamustine rituximab  December 2022:  Ibrutinib 560 mg q.day  03/16/2023-07/18/2023:  Ibrutinib 420 mg q.d.  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 He also had a single visit with Dr Snow in 2018 but never followed back.   Patient had a PET-CT as well as a bone marrow biopsy and repeat peripheral flow cytometry with a diagnosis of CLL/SLL and mantle cell lymphoma.  Patient had symptoms of fatigue and weight loss prior to his workup and diagnosis.  He was diagnosed with mantle cell lymphoma and CLL and started on treatment with bendamustine rituximab, with incomplete response following which he was started on ibrutinib.      Interval history     Today, 03/15/2024, patient denies any acute concerns.  He denies any fevers, chills, drenching night sweats, decreased appetite or weight loss.  He reports tolerating his ibrutinib well without any significant side effects.  He denies any new medications, ER or hospital visits since last follow-up visit with us.    Labs:  03/14/2024 CMP unremarkable, , creatinine 0.86, WBC count 3.5, hemoglobin 13.7, MCV 99.8, platelet count 42177, ANC 2.18.  02/15/2024:  Creatinine 1.08, albumin 3.5, calcium 9.6, alkaline phosphatase 95, total bilirubin 0.5, AST 40, ALT 49, , WBC count 3.47, hemoglobin 14.2, .7, platelet count 14712, ANC  2.18.  12/18/2023 creatinine 0.85, albumin 3.4, calcium 9.4, LFTs unremarkable, WBC count 3.8, hemoglobin 14.2, .5, platelet count 130, ANC 2.94.  10/09/2023 creatinine 0.8, LFTs unremarkable, uric acid 6.2, , WBC count 3.1, hemoglobin 13.8, .7, platelet count 48359, ANC 2.16.  08/14/2023 CMP unremarkable, , uric acid 6.8, WBC count 4.23, hemoglobin 13.6, , platelet count 904013, ANC 2.87.  06/14/23:  wbc 3.26, hgb 13.9, plt 102, ANC 2.21 Cr 1.14 total bili 0.4 AST 26 ALT 23   05/31/23: wbc 3.69, hgb 12.9, plt 4,000, ANC 2.58  05/26/23: cr 1.11, alb 3.6, ca 9.3, LFTs WNL, , uric acid 6.7, mag 2.1, phosphorus 2.7  05/03/2023:  Creatinine 0.86, albumin 3.6, calcium 9.3, alkaline phosphatase 61, total bili 0.5, AST 19, ALT 16, magnesium 2.2, phosphorus 3.2, , uric acid 6.5, WBC count 2.6, hemoglobin 12.7, .3, platelet count 124, ANC 1.69.  04/12/2023:  Creatinine 0.83, albumin 3.5, calcium 9.2, , uric acid 3.8, LFTs within normal limits, magnesium 2.0, phosphorus 2.6, WBC count 2.7, hemoglobin 12.6, ANC 2000, ALC 0.28.  03/28/23: CMP unremarkable except ca 10.2, mag 2.2, phosphorus 3.0, , uric acid 4.2, wbc 3.43, hgb 13.3, plt 78, ANC 2.48  03/10/23: CMP unremarkable, wbc 2.57, hgb 12.8, plt 87, ANC 1.85  03/01/23: cr 0.96, ca 9.2, alb 3.4, AST 35, ALT 24, , mag 1.9, phosphorus 3.4, uric acid 3.8, wbc 3.23, hgb 12.5, plt 74,000, ANC 2.39  02/01/23: Cr 0.92, ca 9.6, alb 3.6, LFTs WNL, wbc 3.08, hgb 12.6, plt 102, ANC 2.15, ALC 0.25  01/20/23; cr 0.97, alb 3.5, LFTs WNL, wbc 3.06, hgb 12.8, plt 64, ANC 2.25  11/22/22: cr 0.86, alb 3.7, LFTs WNL, , uric acid 5.1, wbc 2.16, hgb 12.8, plt 76, mcv 107.8, ALC 0.35, ANC 1.11  10/25/2022:  Creatinine 0.89, albumin 3.5, LFTs within normal limits, , uric acid 3.6.  WBC count 3.15, hemoglobin 12.7, .3, platelet count 125 1000, ANC 2.28.  10/11/2022:  Creatinine 1.04, albumin 3.6, LFTs  within normal limits, magnesium 2.0, phosphorus 2.7, , uric acid 3.2, WBC 2.4, hemoglobin 13.4, .5, platelet count 88073.  ANC 1.77.  2022:  Creatinine 0.80, albumin 3.7, LFTs within normal limits, , uric acid 4.0, WBC count 2.37, hemoglobin 13.2, , platelet count 10964, ANC 1.45.  2022:  Creatinine 0.81, albumin 3.8, calcium 9.2, total bili 0.5, LFTs within normal limits, uric acid 3.3, , WBC 3.45, hemoglobin 13.7, .8, platelet count 77905, ANC 2.06.  2022:  WBC 3.75, hemoglobin 13.1, .5, platelet count 51358, ANC 2.32, uric acid 4.1, , creatinine 1.09, albumin 3.5, calcium 9.6, LFTs within normal limits.  2022 WBC at 2.58 bed neutrophil adequate at 55%.  Hemoglobin 12.7, hematocrit 39.4.  2022:  Creatinine 0.99, calcium 9.1, , uric acid 3.7, phosphorus 3.3, potassium 4.5, WBC count 2.58, hemoglobin 12.7, platelet count 30778, ANC 1.44.  2022:  Potassium 4.9, sodium 140, creatinine 0.87, , uric acid 6.6.  2022:  Creatinine 1.03, albumin 3.3, ,  WBC count 10.32, hemoglobin 12.4, platelet count 135 1000.   2022:  Creatinine 0.93, albumin 3.6, total protein 6.4, , potassium 5.2, WBC count 9.6, hemoglobin 11.4, .4, platelet count 92.   4/ Cr 0.89 Alb 3.4 TP 6.4 Ca 9.4  TSH 2.0 Iron 58n TIBC 285 Ferritin 48 Folate 9.9 B12 426 Hep B s ag neg Uric acid 7.8 Hep C ab neg WBC 9.79 RBC 3.77 Hg 11.8 .5 RDW 13.4 PLT 91 ANC 2.79 ALC 6.36 Direct Ramos neg b2 micro 7.5 Copper 135 Hapto 225 Hep B core ab neg SPEP w/ ARABELLA normal Abs kappa 32 Abs lambda 124 SFLC ratio 0.26  3/18/22 Cr 1 AST 27 ALT 12 AlkPhos 87 Alb 4.1 TP 6.5 Ca 9.8 WBC 9.4 RBC 3.92 Hg 12.2 MCV 96 RDW 15.1  ALC 5.8 ANC 3  5/15/18 WBC 14.71 RBC 4.42 Hg 14.3 MCV 98.6 RDW 13  ANC 2.91 ALC 10.13 AMC 1.29    Imagin2023 PET-CT:  Interval development of mild-to-moderate uptake in multiple  small lymph nodes in the chest and abdomen which is somewhat concerning given the history of lymphoma, continued close monitoring is recommended.  Interval development of nonspecific mild diffuse splenic uptake clinical recommendation is recommended.  Interval development of subtle bilateral ground-glass opacities with mild uptake, which is being favored being inflammatory in etiology, attention on follow-up    08/17/2023 PET-CT:  Stable PET-CT without evidence of metabolically active residual or recurrent lymphoma.    04/12/2023:  PET-CT-interval resolution of moderate uptake in right axillary lymph node with no current PET evidence of metabolically active residual or recurrent lymphoma.    11/7/22 PET CT: Stable right axillary lymph node with mild-to-moderate uptake, representing residual metabolically active tumor. No new suspicious uptake noted     08/25/2022 PET-CT:  Excellent response to therapy since her last exam with interval resolution or near complete resolution of multiple hypermetabolic lymph nodes above and below the diaphragm.  There is residual mild-to-moderate uptake in the right axillary lymph node, Deauville score 4  Interval resolution of diffuse uptake in spleen which is also decreased in size.  No new suspicious uptake.     05/10/22:-  right lower extremity Doppler negative for DVT       4/13/22 PET CT: multiple hypermetabolic LNs b/l neck. Right submandibular LN .7x1.7cm, SUV 7.8. Left submandibular LN 2.3x1.7cm SUV 7.2. Multiple hypermetabolic LNs in chest. Right axillary node 3.3x2cm SUV 8.1. Hypermetabolic left axillary LN SUV 6.4, 1.9x3.2cm. Hypermetabolic right paratracheal LN 2.4x1.6cm SUV 4.9. Right pulmonary hilum SUV 2.7. Subcarinal LN SUV 3.3, 1.2x3.3cm. Spleen enlarged at 15cm and demonstrates heterogeneous mild to moderate uptake SUV 5.2. Moderately intense activity in stomach and proximal duodenum with no degree of focality or discrete mass on CT. Multiple hypermetabolic LNs in  abd and pelvis. Left periaortic LN SUV 6.8, 4x3.5cm. Large hypermetabolic mesenteric mass [id number] SUV 8.5. Aortocaval LN 3.5x3.8cm SUV 5.1. Hypermetabolic mass in left pelvis 5.5x7cm SUV 7. Left external iliac LN SUV 6 2.2x2.1cm. Extensive hypermetabolism anteriorly and laterally in left abdomen, associated with soft tissue thickening that is difficult to separate from bowel loops. B/l inguinal LNs up to 2.9x3.9cm, SUV 6.6    3/29/22 CT neck w/ and w/o contrast: widespread adenopathy identified in parotic glands bilateral, submandibular space, anterior and posterior cervical chains, superior mediastinum and supraclavicular and infraclavicular spaces of the base of neck/upper chest in appearance strongly suspicious for lymphoma. Multiple subcutaneous masses are also identified in lower occipital scalp and upper neck similar to lymphadenopathy. Biopsy of prominent subcutaneous mass in right upper neck is recommended and should present the easiest location for tissue sampling    Path:  5/23/18 peripheral blood flow cytometry: CD5+ B cell population. No abnormal T cell or myeloid cell populations. CD19+, CD20+, CD5+, CD23+, CD11c+ surface kappa light chain+, CD38-, CD22+ (dim/mod), CD43+, FMC7-. Consistent with CLL     04/26/2022:  Bone marrow biopsy-marrow -trilineage hematopoiesis with 10-20% B-cell infiltrate, C5 positive B-cell population with immunophenotype typical for CLL.  CLL fish within normal limits, heavy chain mutation status cannot be determined.  6q, chromosome 12, chromosome 13, chromosome 11, chromosome 17 and t( 11;14) abnormalities not detected.     05/06/2022 left axillary lymph node core biopsy:  Mantle cell lymphoma with a subset consistent with CLL/SLL., CD5 positive monoclonal B-cell population is identified, approximately 91% of total cells, immuno phenotype is nonspecific and may be seen in CLL, marginal zone lymphoma, or large cell lymphoma.  A 2nd small monoclonal B-cell population  approximately 2% of the cells identified with an immunophenotype consistent with CLL.  New genomic consultation lymphoma cells positive for CD 20, CD 5, cyclin D1 and BCL2 with a TI 67 of 10%.  There is no significant staining for CD10, BCL6.  However workup consistent with diagnosis of mantle cell lymphoma.        Past Medical History:   Diagnosis Date    Cataract     Lymphoma        Past Surgical History:   Procedure Laterality Date    HERNIA REPAIR         Family History   Problem Relation Age of Onset    Kidney disease Mother     Colon cancer Father     Colon cancer Sister     Breast cancer Sister     Uterine cancer Sister        Social History     Socioeconomic History    Marital status:    Tobacco Use    Smoking status: Never    Smokeless tobacco: Never   Substance and Sexual Activity    Alcohol use: Not Currently    Drug use: Never       Current Outpatient Medications   Medication Sig Dispense Refill    allopurinoL (ZYLOPRIM) 300 MG tablet Take 1 tablet (300 mg total) by mouth once daily. 30 tablet 5    aspirin (ECOTRIN) 81 MG EC tablet Take 81 mg by mouth once daily.      carvediloL (COREG) 12.5 MG tablet Take 1 tablet by mouth 2 (two) times a day.      EScitalopram oxalate (LEXAPRO) 10 MG tablet Take 1 tablet by mouth once daily.      ezetimibe (ZETIA) 10 mg tablet Take 10 mg by mouth once daily.      ibrutinib (IMBRUVICA) 140 mg Cap Take 1 tablet (140 mg) by mouth once daily. 30 capsule 3    ondansetron (ZOFRAN) 8 MG tablet Take 1 tablet (8 mg total) by mouth every 8 (eight) hours as needed for Nausea. 30 tablet 1    pregabalin (LYRICA) 50 MG capsule pregabalin 50 mg capsule   TAKE 1 CAPSULE BY MOUTH TWICE DAILY      rosuvastatin (CRESTOR) 20 MG tablet Take 20 mg by mouth once daily.      sulfamethoxazole-trimethoprim 800-160mg (BACTRIM DS) 800-160 mg Tab Take 1 tablet by mouth every Mon, Wed, Fri. 30 tablet 3    traMADoL (ULTRAM) 50 mg tablet Take 1 tablet (50 mg total) by mouth every 6 (six) hours  as needed for Pain. 30 tablet 0    valACYclovir (VALTREX) 500 MG tablet Take 1 tablet (500 mg total) by mouth 2 (two) times daily. 60 tablet 3     No current facility-administered medications for this visit.       Review of patient's allergies indicates:   Allergen Reactions    Pregabalin           ROS  CONSTITUTIONAL: no fevers, no chills,  No weight loss, no  fatigue, no weakness  HEMATOLOGIC: no abnormal bleeding, no abnormal bruising, no drenching night sweats  ONCOLOGIC: no new masses or lumps  HEENT: no vision loss, no tinnitus or hearing loss, no nose bleeding, no dysphagia, no odynophagia  CVS: no chest pain, no palpitations, no dyspnea on exertion  RESP: no shortness of breath, no hemoptysis, no cough  GI: no nausea, no vomiting, no diarrhea, no constipation, no melena, no hematochezia, no hematemesis, no abdominal pain, no increase in abdominal girth  : no dysuria, no hematuria, no hesitancy, no scrotal swelling, no discharge  INTEGUMENT: no rashes, no abnormal bruising, no nail pitting, no hyperpigmentation  NEURO: no falls, no memory loss, no paresthesias or dysesthesias, no urofecal incontinence or retention, no loss of strength on any extremity  MSK: no back pain, no new joint pain, no joint swelling  PSYCH: no suicidal or homicidal ideation, no depression, no insomnia, no anhedonia  ENDOCRINE: no heat or cold intolerance, no polyuria, no polydipsia          Physical exam     Vitals:    03/15/24 0948   BP: 117/73   Pulse: 64   Resp: 18   Temp: 97.9 °F (36.6 °C)       GEN: AAOx3, NAD  HEENT: PERRLA, EOMI, edentulous, no oral ulcers  NECK:  Supple, full range of motion  LYMPH:  No axillary, supra clavicle or cervical adenopathy appreciated on today's exam.  CVS: s1s2 RRR, no M/R/G, port in place in right chest wall without surrounding edema or erythema.  RESP: CTA b/l, no crackles, no wheezes or rhonchi, well-healed herpes zoster rash.  ABD: soft, NT, ND, BS+, no hepatomegaly, mild splenomegaly,   EXT:  no deformities, right lower extremity edema.  SKIN:  No rash,.  No evidence of petechia.  NEURO: normal mentation, strength 5/5 on all 4 extremities, no sensory deficits       ASSESSMENT:     # Mantle cell lymphoma, [place]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  Positive for CLL.  PET-CT with evidence of bulky lymphadenopathy, largest measuring 14 cm, spleen measuring 15 cm.  Discussed with patient and his family the diagnosis, natural history and treatment options.  Discussed the risk for TLS given bulky lymphadenopathy, perforation given lymphadenopathy around the bowel loops as well as declining ECOG given his advanced age.  Continuet PJP prophylaxis with Bactrim Monday Wednesday Friday and valacyclovir with treatment.  Continue allopurinol 300 mg q.day for TLS prophylaxis.  Patient is status post cycle 1 of bendamustine and rituximab on 05/18/2022.  Patient has bendamustine was reduced by 25% and rituximab was given 1 week apart to reduce the risk of perforation.  S/p cycle 2 and cycle 3 without significant side effects, dosed for bendamustine increased to 100%.  Discussed with patient interim PET-CT with excellent response to treatment however with residual hypermetabolism in right axillary lymph node with a Deauville score of 4.  Given his excellent tolerance of treatment, will proceed to complete 6 cycles of BR in the setting of above PET-CT report.  Discussed alternate treatment plan to proceed with ibrutinib given partial response.  Patient elected to proceed with another 2 cycles of BR with plans to repeat PET-CT after completion of treatment to assess treatment response.  Given incomplete response on PET-CT after completion of 6 hours ID, he was started on ibrutinib in second-line initially at 560 mg q.day.    Given patient's thrombocytopenia, dose was reduced to 420 mg in March 2023  PET-CT in April  2023 with excellent response with resolution of axillary adenopathy  PET-CT in August 2023 with no evidence of disease  PET-CT in December 2023 with findings concerning for disease recurrence  Patient was started back on ibrutinib 140 mg q.day in the last week of January 2024.         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      Based on available information, likely SLL/CLL with some constitutional symptoms including weight loss, fatigue and night sweats. Adenopathy easily palpable above and below the diaphragm. Mild thrombocytopenia w/ PLT count ~120k and very mild anemia with Hg ~12 g/dl. No hypercalcemia, no evidence of immune paresis  Biopsies 4/11/22 with Dr Brannon were FNAs and non diagnostic for CLL. I do not know what IHC was obtained or whether pathologist knew what the potential diagnosis was as report only says negative for carcinoma. Will discuss with Dr Lan to add CD5, CD10, CD19, CD20 etc if enough tissue  Regardless, will also refer for axillary LN biopsy, either excisional or at the very least core. Referral sent today  PET CT 4/2022 findings are noted. Given his advanced age he was not refer for EGD. Masses in abdomen are likely matted gurinder conglomerates and I do not think it would be worth it to attempt a biopsy of those  CLL FISH  Negative , IGHV mutation status  Hep B and C negative  Allopurinol 300mg daily with PO hydration prior to initiation of any therapy  Given concomitant diagnosis of CLL and mantle cell lymphoma will plan to initiate treatment for mantle cell lymphoma and CLL with bendamustine rituximab.  Will consider BTK inhibitors if patient is intolerant to bendamustine/rituximab in the second-line      Plan:  Reviewed labs, noted   Stable thrombocytopenia   Discussed the plan to proceed with next cycle of ibrutinib, dose reduced to 140 mg days 1-21 every 28 days due to persistent thrombocytopenia  Will plan to follow-up with patient in 4 weeks prior to  his next cycle of treatment, labs day prior to assess treatment tolerance.    Will plan for PET-CT after  after 3-4 months of treatment to assess treatment response.        Portions of the record may have been created with voice recognition software. Occasional wrong-word or sound-a-like substitutions may have occurred due to the inherent limitations of voice recognition software. Read the chart carefully and recognize, using context, where substitutions have occurred.

## 2024-04-12 ENCOUNTER — OFFICE VISIT (OUTPATIENT)
Dept: HEMATOLOGY/ONCOLOGY | Facility: CLINIC | Age: 84
End: 2024-04-12
Payer: MEDICARE

## 2024-04-12 VITALS
SYSTOLIC BLOOD PRESSURE: 115 MMHG | HEART RATE: 66 BPM | OXYGEN SATURATION: 99 % | WEIGHT: 177.88 LBS | HEIGHT: 64 IN | TEMPERATURE: 98 F | RESPIRATION RATE: 18 BRPM | DIASTOLIC BLOOD PRESSURE: 72 MMHG | BODY MASS INDEX: 30.37 KG/M2

## 2024-04-12 DIAGNOSIS — C83.13 MANTLE CELL LYMPHOMA OF INTRA-ABDOMINAL LYMPH NODES: Primary | ICD-10-CM

## 2024-04-12 DIAGNOSIS — Z51.11 ENCOUNTER FOR ANTINEOPLASTIC CHEMOTHERAPY: ICD-10-CM

## 2024-04-12 PROCEDURE — 99215 OFFICE O/P EST HI 40 MIN: CPT | Mod: ,,, | Performed by: STUDENT IN AN ORGANIZED HEALTH CARE EDUCATION/TRAINING PROGRAM

## 2024-04-12 NOTE — PROGRESS NOTES
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     Treatment history    05/18/2022-10/26/2022:  6 cycles of bendamustine rituximab  December 2022:  Ibrutinib 560 mg q.day  03/16/2023-07/18/2023:  Ibrutinib 420 mg q.d.  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 He also had a single visit with Dr Snow in 2018 but never followed back.   Patient had a PET-CT as well as a bone marrow biopsy and repeat peripheral flow cytometry with a diagnosis of CLL/SLL and mantle cell lymphoma.  Patient had symptoms of fatigue and weight loss prior to his workup and diagnosis.  He was diagnosed with mantle cell lymphoma and CLL and started on treatment with bendamustine rituximab, with incomplete response following which he was started on ibrutinib.      Interval history     Today, 04/12/2024, patient denies any acute concerns today.  He denies any fevers, chills, drenching night sweats, decreased appetite or weight loss he denies any new lumps or bumps, new medications, ER or hospital visits.  He reports compliance with ibrutinib for 3 weeks followed by 1 week off.    Labs:    04/11/2024 creatinine 0.96, albumin 3.3, LFTs unremarkable, , TSH 2.7, folic acid 11.4, vitamin B12 479, WBC count 2.7, hemoglobin 13.3, MCV 99, platelet count 56498  03/14/2024 CMP unremarkable, , creatinine 0.86, WBC count 3.5, hemoglobin 13.7, MCV 99.8, platelet count 44859, ANC 2.18.  02/15/2024:  Creatinine 1.08, albumin 3.5,  calcium 9.6, alkaline phosphatase 95, total bilirubin 0.5, AST 40, ALT 49, , WBC count 3.47, hemoglobin 14.2, .7, platelet count 45416, ANC 2.18.  12/18/2023 creatinine 0.85, albumin 3.4, calcium 9.4, LFTs unremarkable, WBC count 3.8, hemoglobin 14.2, .5, platelet count 130, ANC 2.94.  10/09/2023 creatinine 0.8, LFTs unremarkable, uric acid 6.2, , WBC count 3.1, hemoglobin 13.8, .7, platelet count 46562, ANC 2.16.  08/14/2023 CMP unremarkable, , uric acid 6.8, WBC count 4.23, hemoglobin 13.6, , platelet count 590004, ANC 2.87.  06/14/23:  wbc 3.26, hgb 13.9, plt 102, ANC 2.21 Cr 1.14 total bili 0.4 AST 26 ALT 23   05/31/23: wbc 3.69, hgb 12.9, plt 4,000, ANC 2.58  05/26/23: cr 1.11, alb 3.6, ca 9.3, LFTs WNL, , uric acid 6.7, mag 2.1, phosphorus 2.7  05/03/2023:  Creatinine 0.86, albumin 3.6, calcium 9.3, alkaline phosphatase 61, total bili 0.5, AST 19, ALT 16, magnesium 2.2, phosphorus 3.2, , uric acid 6.5, WBC count 2.6, hemoglobin 12.7, .3, platelet count 124, ANC 1.69.  04/12/2023:  Creatinine 0.83, albumin 3.5, calcium 9.2, , uric acid 3.8, LFTs within normal limits, magnesium 2.0, phosphorus 2.6, WBC count 2.7, hemoglobin 12.6, ANC 2000, ALC 0.28.  03/28/23: CMP unremarkable except ca 10.2, mag 2.2, phosphorus 3.0, , uric acid 4.2, wbc 3.43, hgb 13.3, plt 78, ANC 2.48  03/10/23: CMP unremarkable, wbc 2.57, hgb 12.8, plt 87, ANC 1.85  03/01/23: cr 0.96, ca 9.2, alb 3.4, AST 35, ALT 24, , mag 1.9, phosphorus 3.4, uric acid 3.8, wbc 3.23, hgb 12.5, plt 74,000, ANC 2.39  02/01/23: Cr 0.92, ca 9.6, alb 3.6, LFTs WNL, wbc 3.08, hgb 12.6, plt 102, ANC 2.15, ALC 0.25  01/20/23; cr 0.97, alb 3.5, LFTs WNL, wbc 3.06, hgb 12.8, plt 64, ANC 2.25  11/22/22: cr 0.86, alb 3.7, LFTs WNL, , uric acid 5.1, wbc 2.16, hgb 12.8, plt 76, mcv 107.8, ALC 0.35, ANC 1.11  10/25/2022:  Creatinine 0.89, albumin 3.5, LFTs within normal  limits, , uric acid 3.6.  WBC count 3.15, hemoglobin 12.7, .3, platelet count 125 1000, ANC 2.28.  10/11/2022:  Creatinine 1.04, albumin 3.6, LFTs within normal limits, magnesium 2.0, phosphorus 2.7, , uric acid 3.2, WBC 2.4, hemoglobin 13.4, .5, platelet count 18894.  ANC 1.77.  09/13/2022:  Creatinine 0.80, albumin 3.7, LFTs within normal limits, , uric acid 4.0, WBC count 2.37, hemoglobin 13.2, , platelet count 44218, ANC 1.45.  08/16/2022:  Creatinine 0.81, albumin 3.8, calcium 9.2, total bili 0.5, LFTs within normal limits, uric acid 3.3, , WBC 3.45, hemoglobin 13.7, .8, platelet count 34425, ANC 2.06.  07/19/2022:  WBC 3.75, hemoglobin 13.1, .5, platelet count 31712, ANC 2.32, uric acid 4.1, , creatinine 1.09, albumin 3.5, calcium 9.6, LFTs within normal limits.  06/21/2022 WBC at 2.58 bed neutrophil adequate at 55%.  Hemoglobin 12.7, hematocrit 39.4.  06/14/2022:  Creatinine 0.99, calcium 9.1, , uric acid 3.7, phosphorus 3.3, potassium 4.5, WBC count 2.58, hemoglobin 12.7, platelet count 22360, ANC 1.44.  05/27/2022:  Potassium 4.9, sodium 140, creatinine 0.87, , uric acid 6.6.  05/17/2022:  Creatinine 1.03, albumin 3.3, ,  WBC count 10.32, hemoglobin 12.4, platelet count 135 1000.   05/02/2022:  Creatinine 0.93, albumin 3.6, total protein 6.4, , potassium 5.2, WBC count 9.6, hemoglobin 11.4, .4, platelet count 92.   4/12/22 Cr 0.89 Alb 3.4 TP 6.4 Ca 9.4  TSH 2.0 Iron 58n TIBC 285 Ferritin 48 Folate 9.9 B12 426 Hep B s ag neg Uric acid 7.8 Hep C ab neg WBC 9.79 RBC 3.77 Hg 11.8 .5 RDW 13.4 PLT 91 ANC 2.79 ALC 6.36 Direct Ramos neg b2 micro 7.5 Copper 135 Hapto 225 Hep B core ab neg SPEP w/ ARABELLA normal Abs kappa 32 Abs lambda 124 SFLC ratio 0.26  3/18/22 Cr 1 AST 27 ALT 12 AlkPhos 87 Alb 4.1 TP 6.5 Ca 9.8 WBC 9.4 RBC 3.92 Hg 12.2 MCV 96 RDW 15.1  ALC 5.8 ANC 3  5/15/18 WBC 14.71 RBC  4.42 Hg 14.3 MCV 98.6 RDW 13  ANC 2.91 ALC 10.13 AMC 1.29    Imagin2023 PET-CT:  Interval development of mild-to-moderate uptake in multiple small lymph nodes in the chest and abdomen which is somewhat concerning given the history of lymphoma, continued close monitoring is recommended.  Interval development of nonspecific mild diffuse splenic uptake clinical recommendation is recommended.  Interval development of subtle bilateral ground-glass opacities with mild uptake, which is being favored being inflammatory in etiology, attention on follow-up    2023 PET-CT:  Stable PET-CT without evidence of metabolically active residual or recurrent lymphoma.    2023:  PET-CT-interval resolution of moderate uptake in right axillary lymph node with no current PET evidence of metabolically active residual or recurrent lymphoma.    22 PET CT: Stable right axillary lymph node with mild-to-moderate uptake, representing residual metabolically active tumor. No new suspicious uptake noted     2022 PET-CT:  Excellent response to therapy since her last exam with interval resolution or near complete resolution of multiple hypermetabolic lymph nodes above and below the diaphragm.  There is residual mild-to-moderate uptake in the right axillary lymph node, Deauville score 4  Interval resolution of diffuse uptake in spleen which is also decreased in size.  No new suspicious uptake.     05/10/22:-  right lower extremity Doppler negative for DVT       22 PET CT: multiple hypermetabolic LNs b/l neck. Right submandibular LN .7x1.7cm, SUV 7.8. Left submandibular LN 2.3x1.7cm SUV 7.2. Multiple hypermetabolic LNs in chest. Right axillary node 3.3x2cm SUV 8.1. Hypermetabolic left axillary LN SUV 6.4, 1.9x3.2cm. Hypermetabolic right paratracheal LN 2.4x1.6cm SUV 4.9. Right pulmonary hilum SUV 2.7. Subcarinal LN SUV 3.3, 1.2x3.3cm. Spleen enlarged at 15cm and demonstrates heterogeneous mild to moderate  uptake SUV 5.2. Moderately intense activity in stomach and proximal duodenum with no degree of focality or discrete mass on CT. Multiple hypermetabolic LNs in abd and pelvis. Left periaortic LN SUV 6.8, 4x3.5cm. Large hypermetabolic mesenteric mass [id number] SUV 8.5. Aortocaval LN 3.5x3.8cm SUV 5.1. Hypermetabolic mass in left pelvis 5.5x7cm SUV 7. Left external iliac LN SUV 6 2.2x2.1cm. Extensive hypermetabolism anteriorly and laterally in left abdomen, associated with soft tissue thickening that is difficult to separate from bowel loops. B/l inguinal LNs up to 2.9x3.9cm, SUV 6.6    3/29/22 CT neck w/ and w/o contrast: widespread adenopathy identified in parotic glands bilateral, submandibular space, anterior and posterior cervical chains, superior mediastinum and supraclavicular and infraclavicular spaces of the base of neck/upper chest in appearance strongly suspicious for lymphoma. Multiple subcutaneous masses are also identified in lower occipital scalp and upper neck similar to lymphadenopathy. Biopsy of prominent subcutaneous mass in right upper neck is recommended and should present the easiest location for tissue sampling    Path:  5/23/18 peripheral blood flow cytometry: CD5+ B cell population. No abnormal T cell or myeloid cell populations. CD19+, CD20+, CD5+, CD23+, CD11c+ surface kappa light chain+, CD38-, CD22+ (dim/mod), CD43+, FMC7-. Consistent with CLL     04/26/2022:  Bone marrow biopsy-marrow -trilineage hematopoiesis with 10-20% B-cell infiltrate, C5 positive B-cell population with immunophenotype typical for CLL.  CLL fish within normal limits, heavy chain mutation status cannot be determined.  6q, chromosome 12, chromosome 13, chromosome 11, chromosome 17 and t( 11;14) abnormalities not detected.     05/06/2022 left axillary lymph node core biopsy:  Mantle cell lymphoma with a subset consistent with CLL/SLL., CD5 positive monoclonal B-cell population is identified, approximately 91% of  total cells, immuno phenotype is nonspecific and may be seen in CLL, marginal zone lymphoma, or large cell lymphoma.  A 2nd small monoclonal B-cell population approximately 2% of the cells identified with an immunophenotype consistent with CLL.  New genomic consultation lymphoma cells positive for CD 20, CD 5, cyclin D1 and BCL2 with a TI 67 of 10%.  There is no significant staining for CD10, BCL6.  However workup consistent with diagnosis of mantle cell lymphoma.        Past Medical History:   Diagnosis Date    Cataract     Lymphoma        Past Surgical History:   Procedure Laterality Date    HERNIA REPAIR         Family History   Problem Relation Age of Onset    Kidney disease Mother     Colon cancer Father     Colon cancer Sister     Breast cancer Sister     Uterine cancer Sister        Social History     Socioeconomic History    Marital status:    Tobacco Use    Smoking status: Never    Smokeless tobacco: Never   Substance and Sexual Activity    Alcohol use: Not Currently    Drug use: Never       Current Outpatient Medications   Medication Sig Dispense Refill    allopurinoL (ZYLOPRIM) 300 MG tablet Take 1 tablet (300 mg total) by mouth once daily. 30 tablet 5    aspirin (ECOTRIN) 81 MG EC tablet Take 81 mg by mouth once daily.      carvediloL (COREG) 12.5 MG tablet Take 1 tablet by mouth 2 (two) times a day.      EScitalopram oxalate (LEXAPRO) 10 MG tablet Take 1 tablet by mouth once daily.      ezetimibe (ZETIA) 10 mg tablet Take 10 mg by mouth once daily.      ibrutinib (IMBRUVICA) 140 mg Cap Take 1 tablet (140 mg) by mouth once daily. 30 capsule 3    ondansetron (ZOFRAN) 8 MG tablet Take 1 tablet (8 mg total) by mouth every 8 (eight) hours as needed for Nausea. 30 tablet 1    pregabalin (LYRICA) 50 MG capsule pregabalin 50 mg capsule   TAKE 1 CAPSULE BY MOUTH TWICE DAILY      rosuvastatin (CRESTOR) 20 MG tablet Take 20 mg by mouth once daily.      sulfamethoxazole-trimethoprim 800-160mg (BACTRIM DS)  800-160 mg Tab Take 1 tablet by mouth every Mon, Wed, Fri. 30 tablet 3    traMADoL (ULTRAM) 50 mg tablet Take 1 tablet (50 mg total) by mouth every 6 (six) hours as needed for Pain. 30 tablet 0    valACYclovir (VALTREX) 500 MG tablet Take 1 tablet (500 mg total) by mouth 2 (two) times daily. 60 tablet 3     No current facility-administered medications for this visit.       Review of patient's allergies indicates:   Allergen Reactions    Pregabalin           ROS  CONSTITUTIONAL: no fevers, no chills,  No weight loss, no  fatigue, no weakness  HEMATOLOGIC: no abnormal bleeding, no abnormal bruising, no drenching night sweats  ONCOLOGIC: no new masses or lumps  HEENT: no vision loss, no tinnitus or hearing loss, no nose bleeding, no dysphagia, no odynophagia  CVS: no chest pain, no palpitations, no dyspnea on exertion  RESP: no shortness of breath, no hemoptysis, no cough  GI: no nausea, no vomiting, no diarrhea, no constipation, no melena, no hematochezia, no hematemesis, no abdominal pain, no increase in abdominal girth  : no dysuria, no hematuria, no hesitancy, no scrotal swelling, no discharge  INTEGUMENT: no rashes, no abnormal bruising, no nail pitting, no hyperpigmentation  NEURO: no falls, no memory loss, no paresthesias or dysesthesias, no urofecal incontinence or retention, no loss of strength on any extremity  MSK: no back pain, no new joint pain, no joint swelling  PSYCH: no suicidal or homicidal ideation, no depression, no insomnia, no anhedonia  ENDOCRINE: no heat or cold intolerance, no polyuria, no polydipsia          Physical exam     Vitals:    04/12/24 1019   BP: 115/72   Pulse: 66   Resp: 18   Temp: 97.8 °F (36.6 °C)       GEN: AAOx3, NAD  HEENT: PERRLA, EOMI, edentulous, no oral ulcers  NECK:  Supple, full range of motion  LYMPH:  No axillary, supra clavicle or cervical adenopathy appreciated on today's exam.  CVS: s1s2 RRR, no M/R/G, port in place in right chest wall without surrounding edema or  erythema.  RESP: CTA b/l, no crackles, no wheezes or rhonchi, well-healed herpes zoster rash.  ABD: soft, NT, ND, BS+, no hepatomegaly, mild splenomegaly,   EXT: no deformities, right lower extremity edema.  SKIN:  No rash,.  No evidence of petechia.  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  Positive for CLL.  PET-CT with evidence of bulky lymphadenopathy, largest measuring 14 cm, spleen measuring 15 cm.  Discussed with patient and his family the diagnosis, natural history and treatment options.  Discussed the risk for TLS given bulky lymphadenopathy, perforation given lymphadenopathy around the bowel loops as well as declining ECOG given his advanced age.  Continuet PJP prophylaxis with Bactrim Monday Wednesday Friday and valacyclovir with treatment.  Continue allopurinol 300 mg q.day for TLS prophylaxis.  Patient is status post cycle 1 of bendamustine and rituximab on 05/18/2022.  Patient has bendamustine was reduced by 25% and rituximab was given 1 week apart to reduce the risk of perforation.  S/p cycle 2 and cycle 3 without significant side effects, dosed for bendamustine increased to 100%.  Discussed with patient interim PET-CT with excellent response to treatment however with residual hypermetabolism in right axillary lymph node with a Deauville score of 4.  Given his excellent tolerance of treatment, will proceed to complete 6 cycles of BR in the setting of above PET-CT report.  Discussed alternate treatment plan to proceed with ibrutinib given partial response.  Patient elected to proceed with another 2 cycles of BR with plans to repeat PET-CT after completion of treatment to assess treatment response.  Given incomplete response on PET-CT after completion of 6 hours NH, he was  started on ibrutinib in second-line initially at 560 mg q.day.    Given patient's thrombocytopenia, dose was reduced to 420 mg in March 2023  PET-CT in April 2023 with excellent response with resolution of axillary adenopathy  PET-CT in August 2023 with no evidence of disease  PET-CT in December 2023 with findings concerning for disease recurrence  Patient was started back on ibrutinib 140 mg q.day in the last week of January 2024.         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      Based on available information, likely SLL/CLL with some constitutional symptoms including weight loss, fatigue and night sweats. Adenopathy easily palpable above and below the diaphragm. Mild thrombocytopenia w/ PLT count ~120k and very mild anemia with Hg ~12 g/dl. No hypercalcemia, no evidence of immune paresis  Biopsies 4/11/22 with Dr Brannon were FNAs and non diagnostic for CLL. I do not know what IHC was obtained or whether pathologist knew what the potential diagnosis was as report only says negative for carcinoma. Will discuss with Dr Lan to add CD5, CD10, CD19, CD20 etc if enough tissue  Regardless, will also refer for axillary LN biopsy, either excisional or at the very least core. Referral sent today  PET CT 4/2022 findings are noted. Given his advanced age he was not refer for EGD. Masses in abdomen are likely matted gurinder conglomerates and I do not think it would be worth it to attempt a biopsy of those  CLL FISH  Negative , IGHV mutation status  Hep B and C negative  Allopurinol 300mg daily with PO hydration prior to initiation of any therapy  Given concomitant diagnosis of CLL and mantle cell lymphoma will plan to initiate treatment for mantle cell lymphoma and CLL with bendamustine rituximab.  Will consider BTK inhibitors if patient is intolerant to bendamustine/rituximab in the second-line      Plan:  Reviewed labs, noted worsening thrombocytopenia   Will plan to hold ibrutinib for another 2  weeks   Plan to repeat blood work in 2 weeks, followed by virtual visit to discuss reinitiation ibrutinib.  Will plan to follow-up in clinic in 4 weeks on 24th May 2024 after re-initiation of ibrutinib on 26th April 2024 with a repeat PET-CT to assess treatment response  Will plan to continue ibrutinib at previous dose reductions, dose reduced to 140 mg days 1-21 every 28 days due to persistent thrombocytopenia        Portions of the record may have been created with voice recognition software. Occasional wrong-word or sound-a-like substitutions may have occurred due to the inherent limitations of voice recognition software.

## 2024-04-26 ENCOUNTER — OFFICE VISIT (OUTPATIENT)
Dept: HEMATOLOGY/ONCOLOGY | Facility: CLINIC | Age: 84
End: 2024-04-26
Payer: MEDICARE

## 2024-04-26 VITALS — WEIGHT: 177 LBS | BODY MASS INDEX: 30.22 KG/M2 | HEIGHT: 64 IN

## 2024-04-26 DIAGNOSIS — C83.13 MANTLE CELL LYMPHOMA OF INTRA-ABDOMINAL LYMPH NODES: Primary | ICD-10-CM

## 2024-04-26 PROCEDURE — 99442 PR PHYSICIAN TELEPHONE EVALUATION 11-20 MIN: CPT | Mod: 95,,,

## 2024-04-26 NOTE — PROGRESS NOTES
Established Patient - Audio Only Telehealth Visit     The patient location is: Home  The chief complaint leading to consultation is: Mantle Cell Lymphoma  Visit type: Virtual visit with audio only (telephone)  Total time spent with patient: 20 minutes       The reason for the audio only service rather than synchronous audio and video virtual visit was related to technical difficulties or patient preference/necessity.     Each patient to whom I provide medical services by telemedicine is:  (1) informed of the relationship between the physician and patient and the respective role of any other health care provider with respect to management of the patient; and (2) notified that they may decline to receive medical services by telemedicine and may withdraw from such care at any time. Patient verbally consented to receive this service via voice-only telephone call.       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     Treatment history    05/18/2022-10/26/2022:  6 cycles of bendamustine rituximab  December 2022:  Ibrutinib 560 mg q.day  03/16/2023-07/18/2023:  Ibrutinib 420 mg q.d.  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 He also had a single visit with Dr Snow in 2018 but never followed back.   Patient had a PET-CT as well as a bone marrow biopsy and repeat peripheral flow cytometry with a diagnosis of CLL/SLL and mantle cell lymphoma.  Patient had symptoms of fatigue and weight loss prior to his workup  and diagnosis.  He was diagnosed with mantle cell lymphoma and CLL and started on treatment with bendamustine rituximab, with incomplete response following which he was started on ibrutinib.      Interval history     Today, 04/26/2024, patient denies any acute concerns today.  He denies any fevers, chills, drenching night sweats, decreased appetite or weight loss he denies any new lumps or bumps, new medications, ER or hospital visits. Will continue holding  ibrutinib for 3 weeks followed by 1 week off.    Labs:    04/25/24: cr 0.97, alb 3.8, ca 9.6, LFTs WNL, , wbc 3.48, hgb 14.2, plt 82,000, ANC 2.46  04/11/2024 creatinine 0.96, albumin 3.3, LFTs unremarkable, , TSH 2.7, folic acid 11.4, vitamin B12 479, WBC count 2.7, hemoglobin 13.3, MCV 99, platelet count 06477  03/14/2024 CMP unremarkable, , creatinine 0.86, WBC count 3.5, hemoglobin 13.7, MCV 99.8, platelet count 67559, ANC 2.18.  02/15/2024:  Creatinine 1.08, albumin 3.5, calcium 9.6, alkaline phosphatase 95, total bilirubin 0.5, AST 40, ALT 49, , WBC count 3.47, hemoglobin 14.2, .7, platelet count 58464, ANC 2.18.  12/18/2023 creatinine 0.85, albumin 3.4, calcium 9.4, LFTs unremarkable, WBC count 3.8, hemoglobin 14.2, .5, platelet count 130, ANC 2.94.  10/09/2023 creatinine 0.8, LFTs unremarkable, uric acid 6.2, , WBC count 3.1, hemoglobin 13.8, .7, platelet count 06786, ANC 2.16.  08/14/2023 CMP unremarkable, , uric acid 6.8, WBC count 4.23, hemoglobin 13.6, , platelet count 929704, ANC 2.87.  06/14/23:  wbc 3.26, hgb 13.9, plt 102, ANC 2.21 Cr 1.14 total bili 0.4 AST 26 ALT 23   05/31/23: wbc 3.69, hgb 12.9, plt 4,000, ANC 2.58  05/26/23: cr 1.11, alb 3.6, ca 9.3, LFTs WNL, , uric acid 6.7, mag 2.1, phosphorus 2.7  05/03/2023:  Creatinine 0.86, albumin 3.6, calcium 9.3, alkaline phosphatase 61, total bili 0.5, AST 19, ALT 16, magnesium 2.2, phosphorus 3.2, , uric acid 6.5,  WBC count 2.6, hemoglobin 12.7, .3, platelet count 124, ANC 1.69.  04/12/2023:  Creatinine 0.83, albumin 3.5, calcium 9.2, , uric acid 3.8, LFTs within normal limits, magnesium 2.0, phosphorus 2.6, WBC count 2.7, hemoglobin 12.6, ANC 2000, ALC 0.28.  03/28/23: CMP unremarkable except ca 10.2, mag 2.2, phosphorus 3.0, , uric acid 4.2, wbc 3.43, hgb 13.3, plt 78, ANC 2.48  03/10/23: CMP unremarkable, wbc 2.57, hgb 12.8, plt 87, ANC 1.85  03/01/23: cr 0.96, ca 9.2, alb 3.4, AST 35, ALT 24, , mag 1.9, phosphorus 3.4, uric acid 3.8, wbc 3.23, hgb 12.5, plt 74,000, ANC 2.39  02/01/23: Cr 0.92, ca 9.6, alb 3.6, LFTs WNL, wbc 3.08, hgb 12.6, plt 102, ANC 2.15, ALC 0.25  01/20/23; cr 0.97, alb 3.5, LFTs WNL, wbc 3.06, hgb 12.8, plt 64, ANC 2.25  11/22/22: cr 0.86, alb 3.7, LFTs WNL, , uric acid 5.1, wbc 2.16, hgb 12.8, plt 76, mcv 107.8, ALC 0.35, ANC 1.11  10/25/2022:  Creatinine 0.89, albumin 3.5, LFTs within normal limits, , uric acid 3.6.  WBC count 3.15, hemoglobin 12.7, .3, platelet count 125 1000, ANC 2.28.  10/11/2022:  Creatinine 1.04, albumin 3.6, LFTs within normal limits, magnesium 2.0, phosphorus 2.7, , uric acid 3.2, WBC 2.4, hemoglobin 13.4, .5, platelet count 95389.  ANC 1.77.  09/13/2022:  Creatinine 0.80, albumin 3.7, LFTs within normal limits, , uric acid 4.0, WBC count 2.37, hemoglobin 13.2, , platelet count 41008, ANC 1.45.  08/16/2022:  Creatinine 0.81, albumin 3.8, calcium 9.2, total bili 0.5, LFTs within normal limits, uric acid 3.3, , WBC 3.45, hemoglobin 13.7, .8, platelet count 59855, ANC 2.06.  07/19/2022:  WBC 3.75, hemoglobin 13.1, .5, platelet count 36334, ANC 2.32, uric acid 4.1, , creatinine 1.09, albumin 3.5, calcium 9.6, LFTs within normal limits.  06/21/2022 WBC at 2.58 bed neutrophil adequate at 55%.  Hemoglobin 12.7, hematocrit 39.4.  06/14/2022:  Creatinine 0.99, calcium 9.1, LDH  143, uric acid 3.7, phosphorus 3.3, potassium 4.5, WBC count 2.58, hemoglobin 12.7, platelet count 59030, ANC 1.44.  2022:  Potassium 4.9, sodium 140, creatinine 0.87, , uric acid 6.6.  2022:  Creatinine 1.03, albumin 3.3, ,  WBC count 10.32, hemoglobin 12.4, platelet count 135 1000.   2022:  Creatinine 0.93, albumin 3.6, total protein 6.4, , potassium 5.2, WBC count 9.6, hemoglobin 11.4, .4, platelet count 92.   4/ Cr 0.89 Alb 3.4 TP 6.4 Ca 9.4  TSH 2.0 Iron 58n TIBC 285 Ferritin 48 Folate 9.9 B12 426 Hep B s ag neg Uric acid 7.8 Hep C ab neg WBC 9.79 RBC 3.77 Hg 11.8 .5 RDW 13.4 PLT 91 ANC 2.79 ALC 6.36 Direct Ramos neg b2 micro 7.5 Copper 135 Hapto 225 Hep B core ab neg SPEP w/ ARABELLA normal Abs kappa 32 Abs lambda 124 SFLC ratio 0.26  3/18/22 Cr 1 AST 27 ALT 12 AlkPhos 87 Alb 4.1 TP 6.5 Ca 9.8 WBC 9.4 RBC 3.92 Hg 12.2 MCV 96 RDW 15.1  ALC 5.8 ANC 3  5/15/18 WBC 14.71 RBC 4.42 Hg 14.3 MCV 98.6 RDW 13  ANC 2.91 ALC 10.13 AMC 1.29    Imagin2023 PET-CT:  Interval development of mild-to-moderate uptake in multiple small lymph nodes in the chest and abdomen which is somewhat concerning given the history of lymphoma, continued close monitoring is recommended.  Interval development of nonspecific mild diffuse splenic uptake clinical recommendation is recommended.  Interval development of subtle bilateral ground-glass opacities with mild uptake, which is being favored being inflammatory in etiology, attention on follow-up    2023 PET-CT:  Stable PET-CT without evidence of metabolically active residual or recurrent lymphoma.    2023:  PET-CT-interval resolution of moderate uptake in right axillary lymph node with no current PET evidence of metabolically active residual or recurrent lymphoma.    22 PET CT: Stable right axillary lymph node with mild-to-moderate uptake, representing residual metabolically active tumor.  No new suspicious uptake noted     08/25/2022 PET-CT:  Excellent response to therapy since her last exam with interval resolution or near complete resolution of multiple hypermetabolic lymph nodes above and below the diaphragm.  There is residual mild-to-moderate uptake in the right axillary lymph node, Deauville score 4  Interval resolution of diffuse uptake in spleen which is also decreased in size.  No new suspicious uptake.     05/10/22:-  right lower extremity Doppler negative for DVT       4/13/22 PET CT: multiple hypermetabolic LNs b/l neck. Right submandibular LN .7x1.7cm, SUV 7.8. Left submandibular LN 2.3x1.7cm SUV 7.2. Multiple hypermetabolic LNs in chest. Right axillary node 3.3x2cm SUV 8.1. Hypermetabolic left axillary LN SUV 6.4, 1.9x3.2cm. Hypermetabolic right paratracheal LN 2.4x1.6cm SUV 4.9. Right pulmonary hilum SUV 2.7. Subcarinal LN SUV 3.3, 1.2x3.3cm. Spleen enlarged at 15cm and demonstrates heterogeneous mild to moderate uptake SUV 5.2. Moderately intense activity in stomach and proximal duodenum with no degree of focality or discrete mass on CT. Multiple hypermetabolic LNs in abd and pelvis. Left periaortic LN SUV 6.8, 4x3.5cm. Large hypermetabolic mesenteric mass [id number] SUV 8.5. Aortocaval LN 3.5x3.8cm SUV 5.1. Hypermetabolic mass in left pelvis 5.5x7cm SUV 7. Left external iliac LN SUV 6 2.2x2.1cm. Extensive hypermetabolism anteriorly and laterally in left abdomen, associated with soft tissue thickening that is difficult to separate from bowel loops. B/l inguinal LNs up to 2.9x3.9cm, SUV 6.6    3/29/22 CT neck w/ and w/o contrast: widespread adenopathy identified in parotic glands bilateral, submandibular space, anterior and posterior cervical chains, superior mediastinum and supraclavicular and infraclavicular spaces of the base of neck/upper chest in appearance strongly suspicious for lymphoma. Multiple subcutaneous masses are also identified in lower occipital scalp and upper neck  similar to lymphadenopathy. Biopsy of prominent subcutaneous mass in right upper neck is recommended and should present the easiest location for tissue sampling    Path:  5/23/18 peripheral blood flow cytometry: CD5+ B cell population. No abnormal T cell or myeloid cell populations. CD19+, CD20+, CD5+, CD23+, CD11c+ surface kappa light chain+, CD38-, CD22+ (dim/mod), CD43+, FMC7-. Consistent with CLL     04/26/2022:  Bone marrow biopsy-marrow -trilineage hematopoiesis with 10-20% B-cell infiltrate, C5 positive B-cell population with immunophenotype typical for CLL.  CLL fish within normal limits, heavy chain mutation status cannot be determined.  6q, chromosome 12, chromosome 13, chromosome 11, chromosome 17 and t( 11;14) abnormalities not detected.     05/06/2022 left axillary lymph node core biopsy:  Mantle cell lymphoma with a subset consistent with CLL/SLL., CD5 positive monoclonal B-cell population is identified, approximately 91% of total cells, immuno phenotype is nonspecific and may be seen in CLL, marginal zone lymphoma, or large cell lymphoma.  A 2nd small monoclonal B-cell population approximately 2% of the cells identified with an immunophenotype consistent with CLL.  New genomic consultation lymphoma cells positive for CD 20, CD 5, cyclin D1 and BCL2 with a TI 67 of 10%.  There is no significant staining for CD10, BCL6.  However workup consistent with diagnosis of mantle cell lymphoma.        Past Medical History:   Diagnosis Date    Cataract     Lymphoma        Past Surgical History:   Procedure Laterality Date    HERNIA REPAIR         Family History   Problem Relation Name Age of Onset    Kidney disease Mother      Colon cancer Father      Colon cancer Sister      Breast cancer Sister      Uterine cancer Sister         Social History     Socioeconomic History    Marital status:    Tobacco Use    Smoking status: Never    Smokeless tobacco: Never   Substance and Sexual Activity    Alcohol  use: Not Currently    Drug use: Never       Current Outpatient Medications   Medication Sig Dispense Refill    allopurinoL (ZYLOPRIM) 300 MG tablet Take 1 tablet (300 mg total) by mouth once daily. 30 tablet 5    aspirin (ECOTRIN) 81 MG EC tablet Take 81 mg by mouth once daily.      carvediloL (COREG) 12.5 MG tablet Take 1 tablet by mouth 2 (two) times a day.      EScitalopram oxalate (LEXAPRO) 10 MG tablet Take 1 tablet by mouth once daily.      ezetimibe (ZETIA) 10 mg tablet Take 10 mg by mouth once daily.      ibrutinib (IMBRUVICA) 140 mg Cap Take 1 tablet (140 mg) by mouth once daily. 30 capsule 3    ondansetron (ZOFRAN) 8 MG tablet Take 1 tablet (8 mg total) by mouth every 8 (eight) hours as needed for Nausea. 30 tablet 1    pregabalin (LYRICA) 50 MG capsule pregabalin 50 mg capsule   TAKE 1 CAPSULE BY MOUTH TWICE DAILY      rosuvastatin (CRESTOR) 20 MG tablet Take 20 mg by mouth once daily.      sulfamethoxazole-trimethoprim 800-160mg (BACTRIM DS) 800-160 mg Tab Take 1 tablet by mouth every Mon, Wed, Fri. 30 tablet 3    traMADoL (ULTRAM) 50 mg tablet Take 1 tablet (50 mg total) by mouth every 6 (six) hours as needed for Pain. 30 tablet 0    valACYclovir (VALTREX) 500 MG tablet Take 1 tablet (500 mg total) by mouth 2 (two) times daily. 60 tablet 3     No current facility-administered medications for this visit.       Review of patient's allergies indicates:   Allergen Reactions    Pregabalin           ROS  CONSTITUTIONAL: no fevers, no chills,  No weight loss, no  fatigue, no weakness  HEMATOLOGIC: no abnormal bleeding, no abnormal bruising, no drenching night sweats  ONCOLOGIC: no new masses or lumps  HEENT: no vision loss, no tinnitus or hearing loss, no nose bleeding, no dysphagia, no odynophagia  CVS: no chest pain, no palpitations, no dyspnea on exertion  RESP: no shortness of breath, no hemoptysis, no cough  GI: no nausea, no vomiting, no diarrhea, no constipation, no melena, no hematochezia, no  hematemesis, no abdominal pain, no increase in abdominal girth  : no dysuria, no hematuria, no hesitancy, no scrotal swelling, no discharge  INTEGUMENT: no rashes, no abnormal bruising, no nail pitting, no hyperpigmentation  NEURO: no falls, no memory loss, no paresthesias or dysesthesias, no urofecal incontinence or retention, no loss of strength on any extremity  MSK: no back pain, no new joint pain, no joint swelling  PSYCH: no suicidal or homicidal ideation, no depression, no insomnia, no anhedonia  ENDOCRINE: no heat or cold intolerance, no polyuria, no polydipsia          Physical exam     There were no vitals filed for this visit.      GEN: AAOx3, NAD  HEENT: PERRLA, EOMI, edentulous, no oral ulcers  NECK:  Supple, full range of motion  LYMPH:  No axillary, supra clavicle or cervical adenopathy appreciated on today's exam.  CVS: s1s2 RRR, no M/R/G, port in place in right chest wall without surrounding edema or erythema.  RESP: CTA b/l, no crackles, no wheezes or rhonchi, well-healed herpes zoster rash.  ABD: soft, NT, ND, BS+, no hepatomegaly, mild splenomegaly,   EXT: no deformities, right lower extremity edema.  SKIN:  No rash,.  No evidence of petechia.  NEURO: normal mentation, strength 5/5 on all 4 extremities, no sensory deficits       ASSESSMENT:     # Mantle cell lymphoma, [place]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  Positive for CLL.  PET-CT with evidence of bulky lymphadenopathy, largest measuring 14 cm, spleen measuring 15 cm.  Discussed with patient and his family the diagnosis, natural history and treatment options.  Discussed the risk for TLS given bulky lymphadenopathy, perforation given lymphadenopathy around the bowel loops as well as declining ECOG given his advanced age.  Continuet PJP prophylaxis with Bactrim Monday Wednesday Friday and valacyclovir with treatment.  Continue  allopurinol 300 mg q.day for TLS prophylaxis.  Patient is status post cycle 1 of bendamustine and rituximab on 05/18/2022.  Patient has bendamustine was reduced by 25% and rituximab was given 1 week apart to reduce the risk of perforation.  S/p cycle 2 and cycle 3 without significant side effects, dosed for bendamustine increased to 100%.  Discussed with patient interim PET-CT with excellent response to treatment however with residual hypermetabolism in right axillary lymph node with a Deauville score of 4.  Given his excellent tolerance of treatment, will proceed to complete 6 cycles of BR in the setting of above PET-CT report.  Discussed alternate treatment plan to proceed with ibrutinib given partial response.  Patient elected to proceed with another 2 cycles of BR with plans to repeat PET-CT after completion of treatment to assess treatment response.  Given incomplete response on PET-CT after completion of 6 hours MS, he was started on ibrutinib in second-line initially at 560 mg q.day.    Given patient's thrombocytopenia, dose was reduced to 420 mg in March 2023  PET-CT in April 2023 with excellent response with resolution of axillary adenopathy  PET-CT in August 2023 with no evidence of disease  PET-CT in December 2023 with findings concerning for disease recurrence  Patient was started back on ibrutinib 140 mg q.day in the last week of January 2024.         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      Based on available information, likely SLL/CLL with some constitutional symptoms including weight loss, fatigue and night sweats. Adenopathy easily palpable above and below the diaphragm. Mild thrombocytopenia w/ PLT count ~120k and very mild anemia with Hg ~12 g/dl. No hypercalcemia, no evidence of immune paresis  Biopsies 4/11/22 with Dr Brannon were FNAs and non diagnostic for CLL. I do not know what IHC was obtained or whether pathologist knew what the potential diagnosis was as  report only says negative for carcinoma. Will discuss with Dr Lan to add CD5, CD10, CD19, CD20 etc if enough tissue  Regardless, will also refer for axillary LN biopsy, either excisional or at the very least core. Referral sent today  PET CT 4/2022 findings are noted. Given his advanced age he was not refer for EGD. Masses in abdomen are likely matted gurinder conglomerates and I do not think it would be worth it to attempt a biopsy of those  CLL FISH  Negative , IGHV mutation status  Hep B and C negative  Allopurinol 300mg daily with PO hydration prior to initiation of any therapy  Given concomitant diagnosis of CLL and mantle cell lymphoma will plan to initiate treatment for mantle cell lymphoma and CLL with bendamustine rituximab.  Will consider BTK inhibitors if patient is intolerant to bendamustine/rituximab in the second-line      Plan:  Reviewed labs, noted improvement of thrombocytopenia from 54,000 to 82,000  Will plan to hold ibrutinib for another week  Plan to repeat blood work next week and discuss via telephone visit, plan to resume ibrutinib if plts > 100,000   Will plan to follow-up in clinic on 5/24/24 with MD to review labs and discuss PET CT results  PET-CT scheduled 5/17/24 ([facility]) to assess treatment response  Once plts ok, will plan to continue ibrutinib at previous dose reductions, dose reduced to 140 mg days 1-21 every 28 days due to persistent thrombocytopenia                                    This service was not originating from a related E/M service provided within the previous 7 days nor will  to an E/M service or procedure within the next 24 hours or my soonest available appointment.  Prevailing standard of care was able to be met in this audio-only visit.

## 2024-05-02 DIAGNOSIS — T45.1X5A CHEMOTHERAPY-INDUCED NAUSEA AND VOMITING: Primary | ICD-10-CM

## 2024-05-02 DIAGNOSIS — R11.2 CHEMOTHERAPY-INDUCED NAUSEA AND VOMITING: Primary | ICD-10-CM

## 2024-05-03 ENCOUNTER — OFFICE VISIT (OUTPATIENT)
Dept: HEMATOLOGY/ONCOLOGY | Facility: CLINIC | Age: 84
End: 2024-05-03
Payer: MEDICARE

## 2024-05-03 VITALS — HEIGHT: 64 IN | WEIGHT: 177 LBS | BODY MASS INDEX: 30.22 KG/M2

## 2024-05-03 DIAGNOSIS — D69.6 THROMBOCYTOPENIA: ICD-10-CM

## 2024-05-03 DIAGNOSIS — C83.13 MANTLE CELL LYMPHOMA OF INTRA-ABDOMINAL LYMPH NODES: Primary | ICD-10-CM

## 2024-05-03 DIAGNOSIS — Z51.11 ENCOUNTER FOR ANTINEOPLASTIC CHEMOTHERAPY: ICD-10-CM

## 2024-05-03 PROCEDURE — 99442 PR PHYSICIAN TELEPHONE EVALUATION 11-20 MIN: CPT | Mod: 95,,,

## 2024-05-03 NOTE — PROGRESS NOTES
Established Patient - Audio Only Telehealth Visit     The patient location is: Home  The chief complaint leading to consultation is: Mantle Cell Lymphoma  Visit type: Virtual visit with audio only (telephone)  Total time spent with patient: 20 minutes       The reason for the audio only service rather than synchronous audio and video virtual visit was related to technical difficulties or patient preference/necessity.     Each patient to whom I provide medical services by telemedicine is:  (1) informed of the relationship between the physician and patient and the respective role of any other health care provider with respect to management of the patient; and (2) notified that they may decline to receive medical services by telemedicine and may withdraw from such care at any time. Patient verbally consented to receive this service via voice-only telephone call.       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     Treatment history    05/18/2022-10/26/2022:  6 cycles of bendamustine rituximab  December 2022:  Ibrutinib 560 mg q.day  03/16/2023-07/18/2023:  Ibrutinib 420 mg q.d.  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 He also had a single visit with Dr Snow in 2018 but never followed back.   Patient had a PET-CT as well as a bone marrow biopsy and repeat peripheral flow cytometry with a diagnosis of CLL/SLL and mantle cell lymphoma.  Patient had symptoms of fatigue and weight loss prior to his  workup and diagnosis.  He was diagnosed with mantle cell lymphoma and CLL and started on treatment with bendamustine rituximab, with incomplete response following which he was started on ibrutinib.      Interval history     Today, 05/2/2024, patient denies any acute concerns today.  He denies any fevers, chills, drenching night sweats, decreased appetite or weight loss he denies any new lumps or bumps, new medications, ER or hospital visits. Will continue holding  ibrutinib today platelets [id number]    Labs:  05/2/24: wbc 3.35, hgb 13.9, plt 75,000, ANC 2.22  04/25/24: cr 0.97, alb 3.8, ca 9.6, LFTs WNL, , wbc 3.48, hgb 14.2, plt 82,000, ANC 2.46  04/11/2024 creatinine 0.96, albumin 3.3, LFTs unremarkable, , TSH 2.7, folic acid 11.4, vitamin B12 479, WBC count 2.7, hemoglobin 13.3, MCV 99, platelet count 61387  03/14/2024 CMP unremarkable, , creatinine 0.86, WBC count 3.5, hemoglobin 13.7, MCV 99.8, platelet count 35901, ANC 2.18.  02/15/2024:  Creatinine 1.08, albumin 3.5, calcium 9.6, alkaline phosphatase 95, total bilirubin 0.5, AST 40, ALT 49, , WBC count 3.47, hemoglobin 14.2, .7, platelet count 28863, ANC 2.18.  12/18/2023 creatinine 0.85, albumin 3.4, calcium 9.4, LFTs unremarkable, WBC count 3.8, hemoglobin 14.2, .5, platelet count 130, ANC 2.94.  10/09/2023 creatinine 0.8, LFTs unremarkable, uric acid 6.2, , WBC count 3.1, hemoglobin 13.8, .7, platelet count 19122, ANC 2.16.  08/14/2023 CMP unremarkable, , uric acid 6.8, WBC count 4.23, hemoglobin 13.6, , platelet count 105913, ANC 2.87.  06/14/23:  wbc 3.26, hgb 13.9, plt 102, ANC 2.21 Cr 1.14 total bili 0.4 AST 26 ALT 23   05/31/23: wbc 3.69, hgb 12.9, plt 4,000, ANC 2.58  05/26/23: cr 1.11, alb 3.6, ca 9.3, LFTs WNL, , uric acid 6.7, mag 2.1, phosphorus 2.7  05/03/2023:  Creatinine 0.86, albumin 3.6, calcium 9.3, alkaline phosphatase 61, total bili 0.5, AST 19, ALT 16, magnesium  2.2, phosphorus 3.2, , uric acid 6.5, WBC count 2.6, hemoglobin 12.7, .3, platelet count 124, ANC 1.69.  04/12/2023:  Creatinine 0.83, albumin 3.5, calcium 9.2, , uric acid 3.8, LFTs within normal limits, magnesium 2.0, phosphorus 2.6, WBC count 2.7, hemoglobin 12.6, ANC 2000, ALC 0.28.  03/28/23: CMP unremarkable except ca 10.2, mag 2.2, phosphorus 3.0, , uric acid 4.2, wbc 3.43, hgb 13.3, plt 78, ANC 2.48  03/10/23: CMP unremarkable, wbc 2.57, hgb 12.8, plt 87, ANC 1.85  03/01/23: cr 0.96, ca 9.2, alb 3.4, AST 35, ALT 24, , mag 1.9, phosphorus 3.4, uric acid 3.8, wbc 3.23, hgb 12.5, plt 74,000, ANC 2.39  02/01/23: Cr 0.92, ca 9.6, alb 3.6, LFTs WNL, wbc 3.08, hgb 12.6, plt 102, ANC 2.15, ALC 0.25  01/20/23; cr 0.97, alb 3.5, LFTs WNL, wbc 3.06, hgb 12.8, plt 64, ANC 2.25  11/22/22: cr 0.86, alb 3.7, LFTs WNL, , uric acid 5.1, wbc 2.16, hgb 12.8, plt 76, mcv 107.8, ALC 0.35, ANC 1.11  10/25/2022:  Creatinine 0.89, albumin 3.5, LFTs within normal limits, , uric acid 3.6.  WBC count 3.15, hemoglobin 12.7, .3, platelet count 125 1000, ANC 2.28.  10/11/2022:  Creatinine 1.04, albumin 3.6, LFTs within normal limits, magnesium 2.0, phosphorus 2.7, , uric acid 3.2, WBC 2.4, hemoglobin 13.4, .5, platelet count 50379.  ANC 1.77.  09/13/2022:  Creatinine 0.80, albumin 3.7, LFTs within normal limits, , uric acid 4.0, WBC count 2.37, hemoglobin 13.2, , platelet count 16913, ANC 1.45.  08/16/2022:  Creatinine 0.81, albumin 3.8, calcium 9.2, total bili 0.5, LFTs within normal limits, uric acid 3.3, , WBC 3.45, hemoglobin 13.7, .8, platelet count 01212, ANC 2.06.  07/19/2022:  WBC 3.75, hemoglobin 13.1, .5, platelet count 46661, ANC 2.32, uric acid 4.1, , creatinine 1.09, albumin 3.5, calcium 9.6, LFTs within normal limits.  06/21/2022 WBC at 2.58 bed neutrophil adequate at 55%.  Hemoglobin 12.7, hematocrit  39.4.  2022:  Creatinine 0.99, calcium 9.1, , uric acid 3.7, phosphorus 3.3, potassium 4.5, WBC count 2.58, hemoglobin 12.7, platelet count 95310, ANC 1.44.  2022:  Potassium 4.9, sodium 140, creatinine 0.87, , uric acid 6.6.  2022:  Creatinine 1.03, albumin 3.3, ,  WBC count 10.32, hemoglobin 12.4, platelet count 135 1000.   2022:  Creatinine 0.93, albumin 3.6, total protein 6.4, , potassium 5.2, WBC count 9.6, hemoglobin 11.4, .4, platelet count 92.   4/ Cr 0.89 Alb 3.4 TP 6.4 Ca 9.4  TSH 2.0 Iron 58n TIBC 285 Ferritin 48 Folate 9.9 B12 426 Hep B s ag neg Uric acid 7.8 Hep C ab neg WBC 9.79 RBC 3.77 Hg 11.8 .5 RDW 13.4 PLT 91 ANC 2.79 ALC 6.36 Direct Ramos neg b2 micro 7.5 Copper 135 Hapto 225 Hep B core ab neg SPEP w/ ARABELLA normal Abs kappa 32 Abs lambda 124 SFLC ratio 0.26  3/18/22 Cr 1 AST 27 ALT 12 AlkPhos 87 Alb 4.1 TP 6.5 Ca 9.8 WBC 9.4 RBC 3.92 Hg 12.2 MCV 96 RDW 15.1  ALC 5.8 ANC 3  5/15/18 WBC 14.71 RBC 4.42 Hg 14.3 MCV 98.6 RDW 13  ANC 2.91 ALC 10.13 AMC 1.29    Imagin2023 PET-CT:  Interval development of mild-to-moderate uptake in multiple small lymph nodes in the chest and abdomen which is somewhat concerning given the history of lymphoma, continued close monitoring is recommended.  Interval development of nonspecific mild diffuse splenic uptake clinical recommendation is recommended.  Interval development of subtle bilateral ground-glass opacities with mild uptake, which is being favored being inflammatory in etiology, attention on follow-up    2023 PET-CT:  Stable PET-CT without evidence of metabolically active residual or recurrent lymphoma.    2023:  PET-CT-interval resolution of moderate uptake in right axillary lymph node with no current PET evidence of metabolically active residual or recurrent lymphoma.    22 PET CT: Stable right axillary lymph node with mild-to-moderate  uptake, representing residual metabolically active tumor. No new suspicious uptake noted     08/25/2022 PET-CT:  Excellent response to therapy since her last exam with interval resolution or near complete resolution of multiple hypermetabolic lymph nodes above and below the diaphragm.  There is residual mild-to-moderate uptake in the right axillary lymph node, Deauville score 4  Interval resolution of diffuse uptake in spleen which is also decreased in size.  No new suspicious uptake.     05/10/22:-  right lower extremity Doppler negative for DVT       4/13/22 PET CT: multiple hypermetabolic LNs b/l neck. Right submandibular LN .7x1.7cm, SUV 7.8. Left submandibular LN 2.3x1.7cm SUV 7.2. Multiple hypermetabolic LNs in chest. Right axillary node 3.3x2cm SUV 8.1. Hypermetabolic left axillary LN SUV 6.4, 1.9x3.2cm. Hypermetabolic right paratracheal LN 2.4x1.6cm SUV 4.9. Right pulmonary hilum SUV 2.7. Subcarinal LN SUV 3.3, 1.2x3.3cm. Spleen enlarged at 15cm and demonstrates heterogeneous mild to moderate uptake SUV 5.2. Moderately intense activity in stomach and proximal duodenum with no degree of focality or discrete mass on CT. Multiple hypermetabolic LNs in abd and pelvis. Left periaortic LN SUV 6.8, 4x3.5cm. Large hypermetabolic mesenteric mass [id number] SUV 8.5. Aortocaval LN 3.5x3.8cm SUV 5.1. Hypermetabolic mass in left pelvis 5.5x7cm SUV 7. Left external iliac LN SUV 6 2.2x2.1cm. Extensive hypermetabolism anteriorly and laterally in left abdomen, associated with soft tissue thickening that is difficult to separate from bowel loops. B/l inguinal LNs up to 2.9x3.9cm, SUV 6.6    3/29/22 CT neck w/ and w/o contrast: widespread adenopathy identified in parotic glands bilateral, submandibular space, anterior and posterior cervical chains, superior mediastinum and supraclavicular and infraclavicular spaces of the base of neck/upper chest in appearance strongly suspicious for lymphoma. Multiple subcutaneous masses are  also identified in lower occipital scalp and upper neck similar to lymphadenopathy. Biopsy of prominent subcutaneous mass in right upper neck is recommended and should present the easiest location for tissue sampling    Path:  5/23/18 peripheral blood flow cytometry: CD5+ B cell population. No abnormal T cell or myeloid cell populations. CD19+, CD20+, CD5+, CD23+, CD11c+ surface kappa light chain+, CD38-, CD22+ (dim/mod), CD43+, FMC7-. Consistent with CLL     04/26/2022:  Bone marrow biopsy-marrow -trilineage hematopoiesis with 10-20% B-cell infiltrate, C5 positive B-cell population with immunophenotype typical for CLL.  CLL fish within normal limits, heavy chain mutation status cannot be determined.  6q, chromosome 12, chromosome 13, chromosome 11, chromosome 17 and t( 11;14) abnormalities not detected.     05/06/2022 left axillary lymph node core biopsy:  Mantle cell lymphoma with a subset consistent with CLL/SLL., CD5 positive monoclonal B-cell population is identified, approximately 91% of total cells, immuno phenotype is nonspecific and may be seen in CLL, marginal zone lymphoma, or large cell lymphoma.  A 2nd small monoclonal B-cell population approximately 2% of the cells identified with an immunophenotype consistent with CLL.  New genomic consultation lymphoma cells positive for CD 20, CD 5, cyclin D1 and BCL2 with a TI 67 of 10%.  There is no significant staining for CD10, BCL6.  However workup consistent with diagnosis of mantle cell lymphoma.        Past Medical History:   Diagnosis Date    Cataract     Lymphoma        Past Surgical History:   Procedure Laterality Date    HERNIA REPAIR         Family History   Problem Relation Name Age of Onset    Kidney disease Mother      Colon cancer Father      Colon cancer Sister      Breast cancer Sister      Uterine cancer Sister         Social History     Socioeconomic History    Marital status:    Tobacco Use    Smoking status: Never    Smokeless  tobacco: Never   Substance and Sexual Activity    Alcohol use: Not Currently    Drug use: Never       Current Outpatient Medications   Medication Sig Dispense Refill    allopurinoL (ZYLOPRIM) 300 MG tablet Take 1 tablet (300 mg total) by mouth once daily. 30 tablet 5    aspirin (ECOTRIN) 81 MG EC tablet Take 81 mg by mouth once daily.      carvediloL (COREG) 12.5 MG tablet Take 1 tablet by mouth 2 (two) times a day.      EScitalopram oxalate (LEXAPRO) 10 MG tablet Take 1 tablet by mouth once daily.      ezetimibe (ZETIA) 10 mg tablet Take 10 mg by mouth once daily.      ibrutinib (IMBRUVICA) 140 mg Cap Take 1 tablet (140 mg) by mouth once daily. 30 capsule 3    ondansetron (ZOFRAN) 8 MG tablet Take 1 tablet (8 mg total) by mouth every 8 (eight) hours as needed for Nausea. 30 tablet 1    pregabalin (LYRICA) 50 MG capsule pregabalin 50 mg capsule   TAKE 1 CAPSULE BY MOUTH TWICE DAILY      rosuvastatin (CRESTOR) 20 MG tablet Take 20 mg by mouth once daily.      traMADoL (ULTRAM) 50 mg tablet Take 1 tablet (50 mg total) by mouth every 6 (six) hours as needed for Pain. 30 tablet 0    sulfamethoxazole-trimethoprim 800-160mg (BACTRIM DS) 800-160 mg Tab Take 1 tablet by mouth every Mon, Wed, Fri. (Patient not taking: Reported on 4/26/2024) 30 tablet 3    valACYclovir (VALTREX) 500 MG tablet Take 1 tablet (500 mg total) by mouth 2 (two) times daily. 60 tablet 3     No current facility-administered medications for this visit.       Review of patient's allergies indicates:   Allergen Reactions    Pregabalin           ROS  CONSTITUTIONAL: no fevers, no chills,  No weight loss, no  fatigue, no weakness  HEMATOLOGIC: no abnormal bleeding, no abnormal bruising, no drenching night sweats  ONCOLOGIC: no new masses or lumps  HEENT: no vision loss, no tinnitus or hearing loss, no nose bleeding, no dysphagia, no odynophagia  CVS: no chest pain, no palpitations, no dyspnea on exertion  RESP: no shortness of breath, no hemoptysis, no  cough  GI: no nausea, no vomiting, no diarrhea, no constipation, no melena, no hematochezia, no hematemesis, no abdominal pain, no increase in abdominal girth  : no dysuria, no hematuria, no hesitancy, no scrotal swelling, no discharge  INTEGUMENT: no rashes, no abnormal bruising, no nail pitting, no hyperpigmentation  NEURO: no falls, no memory loss, no paresthesias or dysesthesias, no urofecal incontinence or retention, no loss of strength on any extremity  MSK: no back pain, no new joint pain, no joint swelling  PSYCH: no suicidal or homicidal ideation, no depression, no insomnia, no anhedonia  ENDOCRINE: no heat or cold intolerance, no polyuria, no polydipsia     ASSESSMENT:     # Mantle cell lymphoma, [place]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  Positive for CLL.  PET-CT with evidence of bulky lymphadenopathy, largest measuring 14 cm, spleen measuring 15 cm.  Discussed with patient and his family the diagnosis, natural history and treatment options.  Discussed the risk for TLS given bulky lymphadenopathy, perforation given lymphadenopathy around the bowel loops as well as declining ECOG given his advanced age.  Continuet PJP prophylaxis with Bactrim Monday Wednesday Friday and valacyclovir with treatment.  Continue allopurinol 300 mg q.day for TLS prophylaxis.  Patient is status post cycle 1 of bendamustine and rituximab on 05/18/2022.  Patient has bendamustine was reduced by 25% and rituximab was given 1 week apart to reduce the risk of perforation.  S/p cycle 2 and cycle 3 without significant side effects, dosed for bendamustine increased to 100%.  Discussed with patient interim PET-CT with excellent response to treatment however with residual hypermetabolism in right axillary lymph node with a Deauville score of 4.  Given his excellent tolerance of treatment, will proceed to complete 6 cycles of BR in  the setting of above PET-CT report.  Discussed alternate treatment plan to proceed with ibrutinib given partial response.  Patient elected to proceed with another 2 cycles of BR with plans to repeat PET-CT after completion of treatment to assess treatment response.  Given incomplete response on PET-CT after completion of 6 hours NM, he was started on ibrutinib in second-line initially at 560 mg q.day.    Given patient's thrombocytopenia, dose was reduced to 420 mg in March 2023  PET-CT in April 2023 with excellent response with resolution of axillary adenopathy  PET-CT in August 2023 with no evidence of disease  PET-CT in December 2023 with findings concerning for disease recurrence  Patient was started back on ibrutinib 140 mg q.day in the last week of January 2024.         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      Based on available information, likely SLL/CLL with some constitutional symptoms including weight loss, fatigue and night sweats. Adenopathy easily palpable above and below the diaphragm. Mild thrombocytopenia w/ PLT count ~120k and very mild anemia with Hg ~12 g/dl. No hypercalcemia, no evidence of immune paresis  Biopsies 4/11/22 with Dr Brannon were FNAs and non diagnostic for CLL. I do not know what IHC was obtained or whether pathologist knew what the potential diagnosis was as report only says negative for carcinoma. Will discuss with Dr Lan to add CD5, CD10, CD19, CD20 etc if enough tissue  Regardless, will also refer for axillary LN biopsy, either excisional or at the very least core. Referral sent today  PET CT 4/2022 findings are noted. Given his advanced age he was not refer for EGD. Masses in abdomen are likely matted gurinder conglomerates and I do not think it would be worth it to attempt a biopsy of those  CLL FISH  Negative , IGHV mutation status  Hep B and C negative  Allopurinol 300mg daily with PO hydration prior to initiation of any therapy  Given  concomitant diagnosis of CLL and mantle cell lymphoma will plan to initiate treatment for mantle cell lymphoma and CLL with bendamustine rituximab.  Will consider BTK inhibitors if patient is intolerant to bendamustine/rituximab in the second-line      Plan:  Reviewed labs, platelet count 09002  Will plan to hold ibrutinib for another week  Plan to repeat blood work next week and discuss via telephone visit, plan to resume ibrutinib if plts > 100,000   Will plan to follow-up in clinic on 5/24/24 with MD to review labs and discuss PET CT results  PET-CT scheduled 5/17/24 ([facility]) to assess treatment response  Once plts ok, will plan to continue ibrutinib at previous dose reductions, dose reduced to 140 mg days 1-21 every 28 days due to persistent thrombocytopenia    Jenni FOXP-C    This service was not originating from a related E/M service provided within the previous 7 days nor will  to an E/M service or procedure within the next 24 hours or my soonest available appointment.  Prevailing standard of care was able to be met in this audio-only visit.

## 2024-05-08 DIAGNOSIS — C83.13 MANTLE CELL LYMPHOMA OF INTRA-ABDOMINAL LYMPH NODES: ICD-10-CM

## 2024-05-10 ENCOUNTER — OFFICE VISIT (OUTPATIENT)
Dept: HEMATOLOGY/ONCOLOGY | Facility: CLINIC | Age: 84
End: 2024-05-10
Payer: MEDICARE

## 2024-05-10 VITALS — WEIGHT: 177 LBS | BODY MASS INDEX: 30.22 KG/M2 | HEIGHT: 64 IN

## 2024-05-10 DIAGNOSIS — C83.13 MANTLE CELL LYMPHOMA OF INTRA-ABDOMINAL LYMPH NODES: Primary | ICD-10-CM

## 2024-05-10 PROCEDURE — 99442 PR PHYSICIAN TELEPHONE EVALUATION 11-20 MIN: CPT | Mod: 95,,,

## 2024-05-10 NOTE — PROGRESS NOTES
Established Patient - Audio Only Telehealth Visit     The patient location is: Home  The chief complaint leading to consultation is: Mantle Cell Lymphoma  Visit type: Virtual visit with audio only (telephone)  Total time spent with patient: 20 minutes       The reason for the audio only service rather than synchronous audio and video virtual visit was related to technical difficulties or patient preference/necessity.     Each patient to whom I provide medical services by telemedicine is:  (1) informed of the relationship between the physician and patient and the respective role of any other health care provider with respect to management of the patient; and (2) notified that they may decline to receive medical services by telemedicine and may withdraw from such care at any time. Patient verbally consented to receive this service via voice-only telephone call.       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     Treatment history    05/18/2022-10/26/2022:  6 cycles of bendamustine rituximab  December 2022:  Ibrutinib 560 mg q.day  03/16/2023-07/18/2023:  Ibrutinib 420 mg q.d.  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 He also had a single visit with Dr Snow in 2018 but never followed back.   Patient had a PET-CT as well as a bone marrow biopsy and repeat peripheral flow cytometry with a diagnosis of CLL/SLL and mantle cell lymphoma.  Patient had symptoms of fatigue and weight loss prior to his  workup and diagnosis.  He was diagnosed with mantle cell lymphoma and CLL and started on treatment with bendamustine rituximab, with incomplete response following which he was started on ibrutinib.      Interval history     Today, 05/10/2024, patient denies any acute concerns today.  He denies any fevers, chills, drenching night sweats, decreased appetite or weight loss he denies any new lumps or bumps, new medications, ER or hospital visits. Will continue holding  ibrutinib today platelets [id number]    Labs:  05/09/24: wbc 3.47 hgb 13.8, plt 78,000, ANC 2.37  05/2/24: wbc 3.35, hgb 13.9, plt 75,000, ANC 2.22  04/25/24: cr 0.97, alb 3.8, ca 9.6, LFTs WNL, , wbc 3.48, hgb 14.2, plt 82,000, ANC 2.46  04/11/2024 creatinine 0.96, albumin 3.3, LFTs unremarkable, , TSH 2.7, folic acid 11.4, vitamin B12 479, WBC count 2.7, hemoglobin 13.3, MCV 99, platelet count [id number]  03/14/2024 CMP unremarkable, , creatinine 0.86, WBC count 3.5, hemoglobin 13.7, MCV 99.8, platelet count 75494, ANC 2.18.  02/15/2024:  Creatinine 1.08, albumin 3.5, calcium 9.6, alkaline phosphatase 95, total bilirubin 0.5, AST 40, ALT 49, , WBC count 3.47, hemoglobin 14.2, .7, platelet count 55577, ANC 2.18.  12/18/2023 creatinine 0.85, albumin 3.4, calcium 9.4, LFTs unremarkable, WBC count 3.8, hemoglobin 14.2, .5, platelet count 130, ANC 2.94.  10/09/2023 creatinine 0.8, LFTs unremarkable, uric acid 6.2, , WBC count 3.1, hemoglobin 13.8, .7, platelet count 15439, ANC 2.16.  08/14/2023 CMP unremarkable, , uric acid 6.8, WBC count 4.23, hemoglobin 13.6, , platelet count [id number], ANC 2.87.  06/14/23:  wbc 3.26, hgb 13.9, plt 102, ANC 2.21 Cr 1.14 total bili 0.4 AST 26 ALT 23   05/31/23: wbc 3.69, hgb 12.9, plt 4,000, ANC 2.58  05/26/23: cr 1.11, alb 3.6, ca 9.3, LFTs WNL, , uric acid 6.7, mag 2.1, phosphorus 2.7  05/03/2023:  Creatinine 0.86, albumin 3.6, calcium 9.3, alkaline  phosphatase 61, total bili 0.5, AST 19, ALT 16, magnesium 2.2, phosphorus 3.2, , uric acid 6.5, WBC count 2.6, hemoglobin 12.7, .3, platelet count 124, ANC 1.69.  04/12/2023:  Creatinine 0.83, albumin 3.5, calcium 9.2, , uric acid 3.8, LFTs within normal limits, magnesium 2.0, phosphorus 2.6, WBC count 2.7, hemoglobin 12.6, ANC 2000, ALC 0.28.  03/28/23: CMP unremarkable except ca 10.2, mag 2.2, phosphorus 3.0, , uric acid 4.2, wbc 3.43, hgb 13.3, plt 78, ANC 2.48  03/10/23: CMP unremarkable, wbc 2.57, hgb 12.8, plt 87, ANC 1.85  03/01/23: cr 0.96, ca 9.2, alb 3.4, AST 35, ALT 24, , mag 1.9, phosphorus 3.4, uric acid 3.8, wbc 3.23, hgb 12.5, plt 74,000, ANC 2.39  02/01/23: Cr 0.92, ca 9.6, alb 3.6, LFTs WNL, wbc 3.08, hgb 12.6, plt 102, ANC 2.15, ALC 0.25  01/20/23; cr 0.97, alb 3.5, LFTs WNL, wbc 3.06, hgb 12.8, plt 64, ANC 2.25  11/22/22: cr 0.86, alb 3.7, LFTs WNL, , uric acid 5.1, wbc 2.16, hgb 12.8, plt 76, mcv 107.8, ALC 0.35, ANC 1.11  10/25/2022:  Creatinine 0.89, albumin 3.5, LFTs within normal limits, , uric acid 3.6.  WBC count 3.15, hemoglobin 12.7, .3, platelet count 125 1000, ANC 2.28.  10/11/2022:  Creatinine 1.04, albumin 3.6, LFTs within normal limits, magnesium 2.0, phosphorus 2.7, , uric acid 3.2, WBC 2.4, hemoglobin 13.4, .5, platelet count 83294.  ANC 1.77.  09/13/2022:  Creatinine 0.80, albumin 3.7, LFTs within normal limits, , uric acid 4.0, WBC count 2.37, hemoglobin 13.2, , platelet count 99260, ANC 1.45.  08/16/2022:  Creatinine 0.81, albumin 3.8, calcium 9.2, total bili 0.5, LFTs within normal limits, uric acid 3.3, , WBC 3.45, hemoglobin 13.7, .8, platelet count 60476, ANC 2.06.  07/19/2022:  WBC 3.75, hemoglobin 13.1, .5, platelet count 61141, ANC 2.32, uric acid 4.1, , creatinine 1.09, albumin 3.5, calcium 9.6, LFTs within normal limits.  06/21/2022 WBC at 2.58 bed neutrophil  adequate at 55%.  Hemoglobin 12.7, hematocrit 39.4.  2022:  Creatinine 0.99, calcium 9.1, , uric acid 3.7, phosphorus 3.3, potassium 4.5, WBC count 2.58, hemoglobin 12.7, platelet count 37995, ANC 1.44.  2022:  Potassium 4.9, sodium 140, creatinine 0.87, , uric acid 6.6.  2022:  Creatinine 1.03, albumin 3.3, ,  WBC count 10.32, hemoglobin 12.4, platelet count 135 1000.   2022:  Creatinine 0.93, albumin 3.6, total protein 6.4, , potassium 5.2, WBC count 9.6, hemoglobin 11.4, .4, platelet count 92.   4/ Cr 0.89 Alb 3.4 TP 6.4 Ca 9.4  TSH 2.0 Iron 58n TIBC 285 Ferritin 48 Folate 9.9 B12 426 Hep B s ag neg Uric acid 7.8 Hep C ab neg WBC 9.79 RBC 3.77 Hg 11.8 .5 RDW 13.4 PLT 91 ANC 2.79 ALC 6.36 Direct Ramos neg b2 micro 7.5 Copper 135 Hapto 225 Hep B core ab neg SPEP w/ ARABELLA normal Abs kappa 32 Abs lambda 124 SFLC ratio 0.26  3/18/22 Cr 1 AST 27 ALT 12 AlkPhos 87 Alb 4.1 TP 6.5 Ca 9.8 WBC 9.4 RBC 3.92 Hg 12.2 MCV 96 RDW 15.1  ALC 5.8 ANC 3  5/15/18 WBC 14.71 RBC 4.42 Hg 14.3 MCV 98.6 RDW 13  ANC 2.91 ALC 10.13 AMC 1.29    Imagin2023 PET-CT:  Interval development of mild-to-moderate uptake in multiple small lymph nodes in the chest and abdomen which is somewhat concerning given the history of lymphoma, continued close monitoring is recommended.  Interval development of nonspecific mild diffuse splenic uptake clinical recommendation is recommended.  Interval development of subtle bilateral ground-glass opacities with mild uptake, which is being favored being inflammatory in etiology, attention on follow-up    2023 PET-CT:  Stable PET-CT without evidence of metabolically active residual or recurrent lymphoma.    2023:  PET-CT-interval resolution of moderate uptake in right axillary lymph node with no current PET evidence of metabolically active residual or recurrent lymphoma.    22 PET CT: Stable right  axillary lymph node with mild-to-moderate uptake, representing residual metabolically active tumor. No new suspicious uptake noted     08/25/2022 PET-CT:  Excellent response to therapy since her last exam with interval resolution or near complete resolution of multiple hypermetabolic lymph nodes above and below the diaphragm.  There is residual mild-to-moderate uptake in the right axillary lymph node, Deauville score 4  Interval resolution of diffuse uptake in spleen which is also decreased in size.  No new suspicious uptake.     05/10/22:-  right lower extremity Doppler negative for DVT       4/13/22 PET CT: multiple hypermetabolic LNs b/l neck. Right submandibular LN .7x1.7cm, SUV 7.8. Left submandibular LN 2.3x1.7cm SUV 7.2. Multiple hypermetabolic LNs in chest. Right axillary node 3.3x2cm SUV 8.1. Hypermetabolic left axillary LN SUV 6.4, 1.9x3.2cm. Hypermetabolic right paratracheal LN 2.4x1.6cm SUV 4.9. Right pulmonary hilum SUV 2.7. Subcarinal LN SUV 3.3, 1.2x3.3cm. Spleen enlarged at 15cm and demonstrates heterogeneous mild to moderate uptake SUV 5.2. Moderately intense activity in stomach and proximal duodenum with no degree of focality or discrete mass on CT. Multiple hypermetabolic LNs in abd and pelvis. Left periaortic LN SUV 6.8, 4x3.5cm. Large hypermetabolic mesenteric mass [id number] SUV 8.5. Aortocaval LN 3.5x3.8cm SUV 5.1. Hypermetabolic mass in left pelvis 5.5x7cm SUV 7. Left external iliac LN SUV 6 2.2x2.1cm. Extensive hypermetabolism anteriorly and laterally in left abdomen, associated with soft tissue thickening that is difficult to separate from bowel loops. B/l inguinal LNs up to 2.9x3.9cm, SUV 6.6    3/29/22 CT neck w/ and w/o contrast: widespread adenopathy identified in parotic glands bilateral, submandibular space, anterior and posterior cervical chains, superior mediastinum and supraclavicular and infraclavicular spaces of the base of neck/upper chest in appearance strongly suspicious for  lymphoma. Multiple subcutaneous masses are also identified in lower occipital scalp and upper neck similar to lymphadenopathy. Biopsy of prominent subcutaneous mass in right upper neck is recommended and should present the easiest location for tissue sampling    Path:  5/23/18 peripheral blood flow cytometry: CD5+ B cell population. No abnormal T cell or myeloid cell populations. CD19+, CD20+, CD5+, CD23+, CD11c+ surface kappa light chain+, CD38-, CD22+ (dim/mod), CD43+, FMC7-. Consistent with CLL     04/26/2022:  Bone marrow biopsy-marrow -trilineage hematopoiesis with 10-20% B-cell infiltrate, C5 positive B-cell population with immunophenotype typical for CLL.  CLL fish within normal limits, heavy chain mutation status cannot be determined.  6q, chromosome 12, chromosome 13, chromosome 11, chromosome 17 and t( 11;14) abnormalities not detected.     05/06/2022 left axillary lymph node core biopsy:  Mantle cell lymphoma with a subset consistent with CLL/SLL., CD5 positive monoclonal B-cell population is identified, approximately 91% of total cells, immuno phenotype is nonspecific and may be seen in CLL, marginal zone lymphoma, or large cell lymphoma.  A 2nd small monoclonal B-cell population approximately 2% of the cells identified with an immunophenotype consistent with CLL.  New genomic consultation lymphoma cells positive for CD 20, CD 5, cyclin D1 and BCL2 with a TI 67 of 10%.  There is no significant staining for CD10, BCL6.  However workup consistent with diagnosis of mantle cell lymphoma.        Past Medical History:   Diagnosis Date    Cataract     Lymphoma        Past Surgical History:   Procedure Laterality Date    HERNIA REPAIR         Family History   Problem Relation Name Age of Onset    Kidney disease Mother      Colon cancer Father      Colon cancer Sister      Breast cancer Sister      Uterine cancer Sister         Social History     Socioeconomic History    Marital status:    Tobacco Use     Smoking status: Never    Smokeless tobacco: Never   Substance and Sexual Activity    Alcohol use: Not Currently    Drug use: Never       Current Outpatient Medications   Medication Sig Dispense Refill    allopurinoL (ZYLOPRIM) 300 MG tablet Take 1 tablet (300 mg total) by mouth once daily. 30 tablet 5    aspirin (ECOTRIN) 81 MG EC tablet Take 81 mg by mouth once daily.      carvediloL (COREG) 12.5 MG tablet Take 1 tablet by mouth 2 (two) times a day.      EScitalopram oxalate (LEXAPRO) 10 MG tablet Take 1 tablet by mouth once daily.      ezetimibe (ZETIA) 10 mg tablet Take 10 mg by mouth once daily.      ondansetron (ZOFRAN) 8 MG tablet Take 1 tablet (8 mg total) by mouth every 8 (eight) hours as needed for Nausea. 30 tablet 1    pregabalin (LYRICA) 50 MG capsule pregabalin 50 mg capsule   TAKE 1 CAPSULE BY MOUTH TWICE DAILY      rosuvastatin (CRESTOR) 20 MG tablet Take 20 mg by mouth once daily.      traMADoL (ULTRAM) 50 mg tablet Take 1 tablet (50 mg total) by mouth every 6 (six) hours as needed for Pain. 30 tablet 0    ibrutinib (IMBRUVICA) 140 mg Cap Take 1 tablet (140 mg) by mouth once daily. (Patient not taking: Reported on 5/10/2024.) 30 capsule 3    valACYclovir (VALTREX) 500 MG tablet Take 1 tablet (500 mg total) by mouth 2 (two) times daily. 60 tablet 3     No current facility-administered medications for this visit.       Review of patient's allergies indicates:   Allergen Reactions    Pregabalin           ROS  CONSTITUTIONAL: no fevers, no chills,  No weight loss, no  fatigue, no weakness  HEMATOLOGIC: no abnormal bleeding, no abnormal bruising, no drenching night sweats  ONCOLOGIC: no new masses or lumps  HEENT: no vision loss, no tinnitus or hearing loss, no nose bleeding, no dysphagia, no odynophagia  CVS: no chest pain, no palpitations, no dyspnea on exertion  RESP: no shortness of breath, no hemoptysis, no cough  GI: no nausea, no vomiting, no diarrhea, no constipation, no melena, no  hematochezia, no hematemesis, no abdominal pain, no increase in abdominal girth  : no dysuria, no hematuria, no hesitancy, no scrotal swelling, no discharge  INTEGUMENT: no rashes, no abnormal bruising, no nail pitting, no hyperpigmentation  NEURO: no falls, no memory loss, no paresthesias or dysesthesias, no urofecal incontinence or retention, no loss of strength on any extremity  MSK: no back pain, no new joint pain, no joint swelling  PSYCH: no suicidal or homicidal ideation, no depression, no insomnia, no anhedonia  ENDOCRINE: no heat or cold intolerance, no polyuria, no polydipsia     ASSESSMENT:     # Mantle cell lymphoma, [place]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  Positive for CLL.  PET-CT with evidence of bulky lymphadenopathy, largest measuring 14 cm, spleen measuring 15 cm.  Discussed with patient and his family the diagnosis, natural history and treatment options.  Discussed the risk for TLS given bulky lymphadenopathy, perforation given lymphadenopathy around the bowel loops as well as declining ECOG given his advanced age.  Continuet PJP prophylaxis with Bactrim Monday Wednesday Friday and valacyclovir with treatment.  Continue allopurinol 300 mg q.day for TLS prophylaxis.  Patient is status post cycle 1 of bendamustine and rituximab on 05/18/2022.  Patient has bendamustine was reduced by 25% and rituximab was given 1 week apart to reduce the risk of perforation.  S/p cycle 2 and cycle 3 without significant side effects, dosed for bendamustine increased to 100%.  Discussed with patient interim PET-CT with excellent response to treatment however with residual hypermetabolism in right axillary lymph node with a Deauville score of 4.  Given his excellent tolerance of treatment, will proceed to complete 6 cycles of BR in the setting of above PET-CT report.  Discussed alternate treatment plan to  proceed with ibrutinib given partial response.  Patient elected to proceed with another 2 cycles of BR with plans to repeat PET-CT after completion of treatment to assess treatment response.  Given incomplete response on PET-CT after completion of 6 hours [place], he was started on ibrutinib in second-line initially at 560 mg q.day.    Given patient's thrombocytopenia, dose was reduced to 420 mg in March 2023  PET-CT in April 2023 with excellent response with resolution of axillary adenopathy  PET-CT in August 2023 with no evidence of disease  PET-CT in December 2023 with findings concerning for disease recurrence  Patient was started back on ibrutinib 140 mg q.day in the last week of January 2024.         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      Based on available information, likely SLL/CLL with some constitutional symptoms including weight loss, fatigue and night sweats. Adenopathy easily palpable above and below the diaphragm. Mild thrombocytopenia w/ PLT count ~120k and very mild anemia with Hg ~12 g/dl. No hypercalcemia, no evidence of immune paresis  Biopsies 4/11/22 with Dr Brannon were FNAs and non diagnostic for CLL. I do not know what IHC was obtained or whether pathologist knew what the potential diagnosis was as report only says negative for carcinoma. Will discuss with Dr Lan to add CD5, CD10, CD19, CD20 etc if enough tissue  Regardless, will also refer for axillary LN biopsy, either excisional or at the very least core. Referral sent today  PET CT 4/2022 findings are noted. Given his advanced age he was not refer for EGD. Masses in abdomen are likely matted gurinder conglomerates and I do not think it would be worth it to attempt a biopsy of those  CLL FISH  Negative , IGHV mutation status  Hep B and C negative  Allopurinol 300mg daily with PO hydration prior to initiation of any therapy  Given concomitant diagnosis of CLL and mantle cell lymphoma will plan to initiate treatment  for mantle cell lymphoma and CLL with bendamustine rituximab.  Will consider BTK inhibitors if patient is intolerant to bendamustine/rituximab in the second-line      Plan:  Reviewed labs, platelet count 78,000  Will plan to continue holding ibrutinib until PET CT is completed  Will plan to follow-up in clinic on 5/24/24 with MD to review labs and discuss PET CT results  PET-CT re-scheduled until 5/20/24 (onclogics) to assess treatment response  Once plts ok, will plan to continue ibrutinib at previous dose reductions, dose reduced to 140 mg days 1-21 every 28 days due to persistent thrombocytopenia        This service was not originating from a related E/M service provided within the previous 7 days nor will  to an E/M service or procedure within the next 24 hours or my soonest available appointment.  Prevailing standard of care was able to be met in this audio-only visit.

## 2024-05-24 ENCOUNTER — OFFICE VISIT (OUTPATIENT)
Dept: HEMATOLOGY/ONCOLOGY | Facility: CLINIC | Age: 84
End: 2024-05-24
Payer: MEDICARE

## 2024-05-24 VITALS
DIASTOLIC BLOOD PRESSURE: 73 MMHG | WEIGHT: 172.5 LBS | OXYGEN SATURATION: 97 % | HEART RATE: 68 BPM | BODY MASS INDEX: 29.45 KG/M2 | RESPIRATION RATE: 18 BRPM | SYSTOLIC BLOOD PRESSURE: 117 MMHG | TEMPERATURE: 98 F | HEIGHT: 64 IN

## 2024-05-24 DIAGNOSIS — C83.13 MANTLE CELL LYMPHOMA OF INTRA-ABDOMINAL LYMPH NODES: Primary | ICD-10-CM

## 2024-05-24 PROCEDURE — 99215 OFFICE O/P EST HI 40 MIN: CPT | Mod: ,,, | Performed by: STUDENT IN AN ORGANIZED HEALTH CARE EDUCATION/TRAINING PROGRAM

## 2024-05-24 RX ORDER — LENALIDOMIDE 10 MG/1
10 CAPSULE ORAL SEE ADMIN INSTRUCTIONS
Qty: 21 EACH | Refills: 0 | Status: SHIPPED | OUTPATIENT
Start: 2024-05-24

## 2024-05-24 NOTE — PROGRESS NOTES
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     Treatment history    05/18/2022-10/26/2022:  6 cycles of bendamustine rituximab  December 2022:  Ibrutinib 560 mg q.day  03/16/2023-07/18/2023:  Ibrutinib 420 mg q.d.  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 He also had a single visit with Dr Snow in 2018 but never followed back.   Patient had a PET-CT as well as a bone marrow biopsy and repeat peripheral flow cytometry with a diagnosis of CLL/SLL and mantle cell lymphoma.  Patient had symptoms of fatigue and weight loss prior to his workup and diagnosis.  He was diagnosed with mantle cell lymphoma and CLL and started on treatment with bendamustine rituximab, with incomplete response following which he was started on ibrutinib.      Interval history     Today, 05/24/2024, patient reports symptoms of fatigue as well as 5 lb weight loss since his last follow-up visit with us.  He denies any fevers, chills, new foci of pain, new lumps or bumps.  He denies any night sweats.  He denies any new medications, ER or hospital visits.    Labs:  05/09/2024 WBC count 3.47, hemoglobin 13.8, platelet count 45869, ANC 2.37  04/11/2024 creatinine 0.96, albumin 3.3, LFTs unremarkable, , TSH 2.7, folic acid 11.4, vitamin B12 479, WBC count 2.7, hemoglobin 13.3, MCV 99, platelet count 79092  03/14/2024 CMP unremarkable, , creatinine 0.86, WBC count 3.5,  hemoglobin 13.7, MCV 99.8, platelet count 00526, ANC 2.18.  02/15/2024:  Creatinine 1.08, albumin 3.5, calcium 9.6, alkaline phosphatase 95, total bilirubin 0.5, AST 40, ALT 49, , WBC count 3.47, hemoglobin 14.2, .7, platelet count 61711, ANC 2.18.  12/18/2023 creatinine 0.85, albumin 3.4, calcium 9.4, LFTs unremarkable, WBC count 3.8, hemoglobin 14.2, .5, platelet count 130, ANC 2.94.  10/09/2023 creatinine 0.8, LFTs unremarkable, uric acid 6.2, , WBC count 3.1, hemoglobin 13.8, .7, platelet count 80451, ANC 2.16.  08/14/2023 CMP unremarkable, , uric acid 6.8, WBC count 4.23, hemoglobin 13.6, , platelet count 104311, ANC 2.87.  06/14/23:  wbc 3.26, hgb 13.9, plt 102, ANC 2.21 Cr 1.14 total bili 0.4 AST 26 ALT 23   05/31/23: wbc 3.69, hgb 12.9, plt 4,000, ANC 2.58  05/26/23: cr 1.11, alb 3.6, ca 9.3, LFTs WNL, , uric acid 6.7, mag 2.1, phosphorus 2.7  05/03/2023:  Creatinine 0.86, albumin 3.6, calcium 9.3, alkaline phosphatase 61, total bili 0.5, AST 19, ALT 16, magnesium 2.2, phosphorus 3.2, , uric acid 6.5, WBC count 2.6, hemoglobin 12.7, .3, platelet count 124, ANC 1.69.  04/12/2023:  Creatinine 0.83, albumin 3.5, calcium 9.2, , uric acid 3.8, LFTs within normal limits, magnesium 2.0, phosphorus 2.6, WBC count 2.7, hemoglobin 12.6, ANC 2000, ALC 0.28.  03/28/23: CMP unremarkable except ca 10.2, mag 2.2, phosphorus 3.0, , uric acid 4.2, wbc 3.43, hgb 13.3, plt 78, ANC 2.48  03/10/23: CMP unremarkable, wbc 2.57, hgb 12.8, plt 87, ANC 1.85  03/01/23: cr 0.96, ca 9.2, alb 3.4, AST 35, ALT 24, , mag 1.9, phosphorus 3.4, uric acid 3.8, wbc 3.23, hgb 12.5, plt 74,000, ANC 2.39  02/01/23: Cr 0.92, ca 9.6, alb 3.6, LFTs WNL, wbc 3.08, hgb 12.6, plt 102, ANC 2.15, ALC 0.25  01/20/23; cr 0.97, alb 3.5, LFTs WNL, wbc 3.06, hgb 12.8, plt 64, ANC 2.25  11/22/22: cr 0.86, alb 3.7, LFTs WNL, , uric acid 5.1, wbc 2.16, hgb 12.8, plt  76, mcv 107.8, ALC 0.35, ANC 1.11  10/25/2022:  Creatinine 0.89, albumin 3.5, LFTs within normal limits, , uric acid 3.6.  WBC count 3.15, hemoglobin 12.7, .3, platelet count 125 1000, ANC 2.28.  10/11/2022:  Creatinine 1.04, albumin 3.6, LFTs within normal limits, magnesium 2.0, phosphorus 2.7, , uric acid 3.2, WBC 2.4, hemoglobin 13.4, .5, platelet count 83480.  ANC 1.77.  09/13/2022:  Creatinine 0.80, albumin 3.7, LFTs within normal limits, , uric acid 4.0, WBC count 2.37, hemoglobin 13.2, , platelet count 89484, ANC 1.45.  08/16/2022:  Creatinine 0.81, albumin 3.8, calcium 9.2, total bili 0.5, LFTs within normal limits, uric acid 3.3, , WBC 3.45, hemoglobin 13.7, .8, platelet count 16810, ANC 2.06.  07/19/2022:  WBC 3.75, hemoglobin 13.1, .5, platelet count 32217, ANC 2.32, uric acid 4.1, , creatinine 1.09, albumin 3.5, calcium 9.6, LFTs within normal limits.  06/21/2022 WBC at 2.58 bed neutrophil adequate at 55%.  Hemoglobin 12.7, hematocrit 39.4.  06/14/2022:  Creatinine 0.99, calcium 9.1, , uric acid 3.7, phosphorus 3.3, potassium 4.5, WBC count 2.58, hemoglobin 12.7, platelet count 25958, ANC 1.44.  05/27/2022:  Potassium 4.9, sodium 140, creatinine 0.87, , uric acid 6.6.  05/17/2022:  Creatinine 1.03, albumin 3.3, ,  WBC count 10.32, hemoglobin 12.4, platelet count 135 1000.   05/02/2022:  Creatinine 0.93, albumin 3.6, total protein 6.4, , potassium 5.2, WBC count 9.6, hemoglobin 11.4, .4, platelet count 92.   4/12/22 Cr 0.89 Alb 3.4 TP 6.4 Ca 9.4  TSH 2.0 Iron 58n TIBC 285 Ferritin 48 Folate 9.9 B12 426 Hep B s ag neg Uric acid 7.8 Hep C ab neg WBC 9.79 RBC 3.77 Hg 11.8 .5 RDW 13.4 PLT 91 ANC 2.79 ALC 6.36 Direct Ramos neg b2 micro 7.5 Copper 135 Hapto 225 Hep B core ab neg SPEP w/ ARABELLA normal Abs kappa 32 Abs lambda 124 SFLC ratio 0.26  3/18/22 Cr 1 AST 27 ALT 12 AlkPhos 87 Alb 4.1 TP 6.5  Ca 9.8 WBC 9.4 RBC 3.92 Hg 12.2 MCV 96 RDW 15.1  ALC 5.8 ANC 3  5/15/18 WBC 14.71 RBC 4.42 Hg 14.3 MCV 98.6 RDW 13  ANC 2.91 ALC 10.13 AMC 1.29    Imagin2024 PET-CT:  Interval progression of lymphoma with increased size and number on metabolically activity of multiple hypermetabolic lymph nodes above and below the diaphragm.  Interval increased size of spleen with diffuse moderate intense uptake also concerning for lymphomatous involvement.  No other suspicious uptake    2023 PET-CT:  Interval development of mild-to-moderate uptake in multiple small lymph nodes in the chest and abdomen which is somewhat concerning given the history of lymphoma, continued close monitoring is recommended.  Interval development of nonspecific mild diffuse splenic uptake clinical recommendation is recommended.  Interval development of subtle bilateral ground-glass opacities with mild uptake, which is being favored being inflammatory in etiology, attention on follow-up    2023 PET-CT:  Stable PET-CT without evidence of metabolically active residual or recurrent lymphoma.    2023:  PET-CT-interval resolution of moderate uptake in right axillary lymph node with no current PET evidence of metabolically active residual or recurrent lymphoma.    22 PET CT: Stable right axillary lymph node with mild-to-moderate uptake, representing residual metabolically active tumor. No new suspicious uptake noted     2022 PET-CT:  Excellent response to therapy since her last exam with interval resolution or near complete resolution of multiple hypermetabolic lymph nodes above and below the diaphragm.  There is residual mild-to-moderate uptake in the right axillary lymph node, Deauville score 4  Interval resolution of diffuse uptake in spleen which is also decreased in size.  No new suspicious uptake.     05/10/22:-  right lower extremity Doppler negative for DVT       22 PET CT: multiple  hypermetabolic LNs b/l neck. Right submandibular LN .7x1.7cm, SUV 7.8. Left submandibular LN 2.3x1.7cm SUV 7.2. Multiple hypermetabolic LNs in chest. Right axillary node 3.3x2cm SUV 8.1. Hypermetabolic left axillary LN SUV 6.4, 1.9x3.2cm. Hypermetabolic right paratracheal LN 2.4x1.6cm SUV 4.9. Right pulmonary hilum SUV 2.7. Subcarinal LN SUV 3.3, 1.2x3.3cm. Spleen enlarged at 15cm and demonstrates heterogeneous mild to moderate uptake SUV 5.2. Moderately intense activity in stomach and proximal duodenum with no degree of focality or discrete mass on CT. Multiple hypermetabolic LNs in abd and pelvis. Left periaortic LN SUV 6.8, 4x3.5cm. Large hypermetabolic mesenteric mass [id number] SUV 8.5. Aortocaval LN 3.5x3.8cm SUV 5.1. Hypermetabolic mass in left pelvis 5.5x7cm SUV 7. Left external iliac LN SUV 6 2.2x2.1cm. Extensive hypermetabolism anteriorly and laterally in left abdomen, associated with soft tissue thickening that is difficult to separate from bowel loops. B/l inguinal LNs up to 2.9x3.9cm, SUV 6.6    3/29/22 CT neck w/ and w/o contrast: widespread adenopathy identified in parotic glands bilateral, submandibular space, anterior and posterior cervical chains, superior mediastinum and supraclavicular and infraclavicular spaces of the base of neck/upper chest in appearance strongly suspicious for lymphoma. Multiple subcutaneous masses are also identified in lower occipital scalp and upper neck similar to lymphadenopathy. Biopsy of prominent subcutaneous mass in right upper neck is recommended and should present the easiest location for tissue sampling    Path:  5/23/18 peripheral blood flow cytometry: CD5+ B cell population. No abnormal T cell or myeloid cell populations. CD19+, CD20+, CD5+, CD23+, CD11c+ surface kappa light chain+, CD38-, CD22+ (dim/mod), CD43+, FMC7-. Consistent with CLL     04/26/2022:  Bone marrow biopsy-marrow -trilineage hematopoiesis with 10-20% B-cell infiltrate, C5 positive B-cell  population with immunophenotype typical for CLL.  CLL fish within normal limits, heavy chain mutation status cannot be determined.  6q, chromosome 12, chromosome 13, chromosome 11, chromosome 17 and t( 11;14) abnormalities not detected.     05/06/2022 left axillary lymph node core biopsy:  Mantle cell lymphoma with a subset consistent with CLL/SLL., CD5 positive monoclonal B-cell population is identified, approximately 91% of total cells, immuno phenotype is nonspecific and may be seen in CLL, marginal zone lymphoma, or large cell lymphoma.  A 2nd small monoclonal B-cell population approximately 2% of the cells identified with an immunophenotype consistent with CLL.  New genomic consultation lymphoma cells positive for CD 20, CD 5, cyclin D1 and BCL2 with a TI 67 of 10%.  There is no significant staining for CD10, BCL6.  However workup consistent with diagnosis of mantle cell lymphoma.        Past Medical History:   Diagnosis Date    Cataract     Lymphoma        Past Surgical History:   Procedure Laterality Date    HERNIA REPAIR         Family History   Problem Relation Name Age of Onset    Kidney disease Mother      Colon cancer Father      Colon cancer Sister      Breast cancer Sister      Uterine cancer Sister         Social History     Socioeconomic History    Marital status:    Tobacco Use    Smoking status: Never    Smokeless tobacco: Never   Substance and Sexual Activity    Alcohol use: Not Currently    Drug use: Never       Current Outpatient Medications   Medication Sig Dispense Refill    allopurinoL (ZYLOPRIM) 300 MG tablet Take 1 tablet (300 mg total) by mouth once daily. 30 tablet 5    aspirin (ECOTRIN) 81 MG EC tablet Take 81 mg by mouth once daily.      carvediloL (COREG) 12.5 MG tablet Take 1 tablet by mouth 2 (two) times a day.      EScitalopram oxalate (LEXAPRO) 10 MG tablet Take 1 tablet by mouth once daily.      ezetimibe (ZETIA) 10 mg tablet Take 10 mg by mouth once daily.      ondansetron  (ZOFRAN) 8 MG tablet Take 1 tablet (8 mg total) by mouth every 8 (eight) hours as needed for Nausea. 30 tablet 1    pregabalin (LYRICA) 50 MG capsule pregabalin 50 mg capsule   TAKE 1 CAPSULE BY MOUTH TWICE DAILY      rosuvastatin (CRESTOR) 20 MG tablet Take 20 mg by mouth once daily.      traMADoL (ULTRAM) 50 mg tablet Take 1 tablet (50 mg total) by mouth every 6 (six) hours as needed for Pain. 30 tablet 0    ibrutinib (IMBRUVICA) 140 mg Cap Take 1 tablet (140 mg) by mouth once daily. (Patient not taking: Reported on 5/10/2024.) 30 capsule 3    valACYclovir (VALTREX) 500 MG tablet Take 1 tablet (500 mg total) by mouth 2 (two) times daily. 60 tablet 3     No current facility-administered medications for this visit.       Review of patient's allergies indicates:   Allergen Reactions    Pregabalin           ROS  CONSTITUTIONAL: no fevers, no chills,  No weight loss, no  fatigue, no weakness  HEMATOLOGIC: no abnormal bleeding, no abnormal bruising, no drenching night sweats  ONCOLOGIC: no new masses or lumps  HEENT: no vision loss, no tinnitus or hearing loss, no nose bleeding, no dysphagia, no odynophagia  CVS: no chest pain, no palpitations, no dyspnea on exertion  RESP: no shortness of breath, no hemoptysis, no cough  GI: no nausea, no vomiting, no diarrhea, no constipation, no melena, no hematochezia, no hematemesis, no abdominal pain, no increase in abdominal girth  : no dysuria, no hematuria, no hesitancy, no scrotal swelling, no discharge  INTEGUMENT: no rashes, no abnormal bruising, no nail pitting, no hyperpigmentation  NEURO: no falls, no memory loss, no paresthesias or dysesthesias, no urofecal incontinence or retention, no loss of strength on any extremity  MSK: no back pain, no new joint pain, no joint swelling  PSYCH: no suicidal or homicidal ideation, no depression, no insomnia, no anhedonia  ENDOCRINE: no heat or cold intolerance, no polyuria, no polydipsia          Physical exam     Vitals:     05/24/24 1109   BP: 117/73   Pulse: 68   Resp: 18   Temp: 97.9 °F (36.6 °C)       GEN: AAOx3, NAD  HEENT: PERRLA, EOMI, edentulous, no oral ulcers  NECK:  Supple, full range of motion  LYMPH:  No axillary, supra clavicle or cervical adenopathy appreciated on today's exam.  CVS: s1s2 RRR, no M/R/G, port in place in right chest wall without surrounding edema or erythema.  RESP: CTA b/l, no crackles, no wheezes or rhonchi, well-healed herpes zoster rash.  ABD: soft, NT, ND, BS+, no hepatomegaly, mild splenomegaly,   EXT: no deformities, right lower extremity edema.  SKIN:  No rash,.  No evidence of petechia.  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  Positive for CLL.  PET-CT with evidence of bulky lymphadenopathy, largest measuring 14 cm, spleen measuring 15 cm.  Discussed with patient and his family the diagnosis, natural history and treatment options.  Discussed the risk for TLS given bulky lymphadenopathy, perforation given lymphadenopathy around the bowel loops as well as declining ECOG given his advanced age.  Continuet PJP prophylaxis with Bactrim Monday Wednesday Friday and valacyclovir with treatment.  Continue allopurinol 300 mg q.day for TLS prophylaxis.  Patient is status post cycle 1 of bendamustine and rituximab on 05/18/2022.  Patient has bendamustine was reduced by 25% and rituximab was given 1 week apart to reduce the risk of perforation.  S/p cycle 2 and cycle 3 without significant side effects, dosed for bendamustine increased to 100%.  Discussed with patient interim PET-CT with excellent response to treatment however with residual hypermetabolism in right axillary lymph node with a Deauville score of 4.  Given his excellent tolerance of treatment, will proceed to  complete 6 cycles of BR in the setting of above PET-CT report.  Discussed alternate treatment plan to proceed with ibrutinib given partial response.  Patient elected to proceed with another 2 cycles of BR with plans to repeat PET-CT after completion of treatment to assess treatment response.  Given incomplete response on PET-CT after completion of 6 hours WA, he was started on ibrutinib in second-line initially at 560 mg q.day.    Given patient's thrombocytopenia, dose was reduced to 420 mg in March 2023  PET-CT in April 2023 with excellent response with resolution of axillary adenopathy  PET-CT in August 2023 with no evidence of disease  PET-CT in December 2023 with findings concerning for disease recurrence  Patient was started back on ibrutinib 140 mg q.day in the last week of January 2024.    Patient had multiple interruptions on ibrutinib due to persistent/worsening thrombocytopenia  PET-CT in May 2024 with findings consistent with progression of disease  Discussed with patient and family the plan to initiate 3rd line treatment with Revlimid/rituximab.  Discussed the palliative nature of treatment and incurable nature of disease.  Patient was like to proceed with therapy.     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      Based on available information, likely SLL/CLL with some constitutional symptoms including weight loss, fatigue and night sweats. Adenopathy easily palpable above and below the diaphragm. Mild thrombocytopenia w/ PLT count ~120k and very mild anemia with Hg ~12 g/dl. No hypercalcemia, no evidence of immune paresis  Biopsies 4/11/22 with Dr Brannon were FNAs and non diagnostic for CLL. I do not know what IHC was obtained or whether pathologist knew what the potential diagnosis was as report only says negative for carcinoma. Will discuss with Dr Lan to add CD5, CD10, CD19, CD20 etc if enough tissue  Regardless, will also refer for axillary LN biopsy, either excisional or  at the very least core. Referral sent today  PET CT 4/2022 findings are noted. Given his advanced age he was not refer for EGD. Masses in abdomen are likely matted gurinder conglomerates and I do not think it would be worth it to attempt a biopsy of those  CLL FISH  Negative , IGHV mutation status  Hep B and C negative  Allopurinol 300mg daily with PO hydration prior to initiation of any therapy  Given concomitant diagnosis of CLL and mantle cell lymphoma will plan to initiate treatment for mantle cell lymphoma and CLL with bendamustine rituximab.  Will consider BTK inhibitors if patient is intolerant to bendamustine/rituximab in the second-line      Plan:  Chemo education   Plan to initiate rituximab and Revlimid on 06/03/2024.  Orders placed for insurance authorization today   Will send a prescription for Revlimid to specialty pharmacy  Will dose reduce Revlimid to 10 mg days 1-21 every 28 days due to thrombocytopenia and mild leukopenia.  Plan to follow-up in clinic prior to cycle 1 day 8 rituximab to assess treatment tolerance  Labs including LDH and uric acid prior to every treatment  Will obtain hepatitis panel prior to initiating rituximab on 06/03/2024.      A total of  40 minutes were spent in review of records, interpretation of test, coordination of care, discussion and counseling with the patient.          Portions of the record may have been created with voice recognition software. Occasional wrong-word or sound-a-like substitutions may have occurred due to the inherent limitations of voice recognition software.

## 2024-05-31 ENCOUNTER — OFFICE VISIT (OUTPATIENT)
Dept: HEMATOLOGY/ONCOLOGY | Facility: CLINIC | Age: 84
End: 2024-05-31
Payer: MEDICARE

## 2024-05-31 VITALS
BODY MASS INDEX: 29.6 KG/M2 | OXYGEN SATURATION: 98 % | DIASTOLIC BLOOD PRESSURE: 65 MMHG | RESPIRATION RATE: 14 BRPM | HEIGHT: 64 IN | SYSTOLIC BLOOD PRESSURE: 99 MMHG | WEIGHT: 173.38 LBS | HEART RATE: 71 BPM | TEMPERATURE: 98 F

## 2024-05-31 DIAGNOSIS — K21.9 GASTROESOPHAGEAL REFLUX DISEASE, UNSPECIFIED WHETHER ESOPHAGITIS PRESENT: Primary | ICD-10-CM

## 2024-05-31 DIAGNOSIS — C83.13 MANTLE CELL LYMPHOMA OF INTRA-ABDOMINAL LYMPH NODES: Primary | ICD-10-CM

## 2024-05-31 PROCEDURE — 99215 OFFICE O/P EST HI 40 MIN: CPT | Mod: ,,, | Performed by: NURSE PRACTITIONER

## 2024-05-31 RX ORDER — ONDANSETRON 4 MG/1
4 TABLET, ORALLY DISINTEGRATING ORAL EVERY 8 HOURS PRN
Qty: 30 TABLET | Refills: 2 | Status: SHIPPED | OUTPATIENT
Start: 2024-05-31

## 2024-05-31 RX ORDER — LIDOCAINE AND PRILOCAINE 25; 25 MG/G; MG/G
CREAM TOPICAL
Qty: 30 G | Refills: 5 | Status: SHIPPED | OUTPATIENT
Start: 2024-05-31

## 2024-05-31 RX ORDER — PANTOPRAZOLE SODIUM 40 MG/1
40 TABLET, DELAYED RELEASE ORAL DAILY
Qty: 30 TABLET | Refills: 1 | Status: SHIPPED | OUTPATIENT
Start: 2024-05-31 | End: 2025-05-31

## 2024-05-31 NOTE — PROGRESS NOTES
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     Treatment history    05/18/2022-10/26/2022:  6 cycles of bendamustine rituximab  December 2022:  Ibrutinib 560 mg q.day  03/16/2023-07/18/2023:  Ibrutinib 420 mg q.d.  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 for lymphocytosis and lymphadenopathy    Of note, review of his records reveals a flow cytometry sent in 2018 that was consistent with CLL. He also had a single visit with Dr Snow in 2018 but never followed back.   Patient had a PET-CT as well as a bone marrow biopsy and repeat peripheral flow cytometry with a diagnosis of CLL/SLL and mantle cell lymphoma.  Patient had symptoms of fatigue and weight loss prior to his workup and diagnosis.  He was diagnosed with mantle cell lymphoma and CLL and started on treatment with bendamustine rituximab, with incomplete response following which he was started on ibrutinib.      Interval history     Today, 05/24/2024, patient reports symptoms of fatigue as well as 5 lb weight loss since his last follow-up visit with us.  He denies any fevers, chills, new foci of pain, new lumps or bumps.  He denies any night sweats.  He denies any new medications, ER or hospital visits.    Labs:  05/09/2024 WBC count 3.47, hemoglobin 13.8, platelet count 14960, ANC 2.37  04/11/2024 creatinine 0.96, albumin 3.3, LFTs unremarkable, , TSH 2.7, folic acid 11.4, vitamin B12 479, WBC count 2.7, hemoglobin 13.3, MCV 99, platelet count 71725  03/14/2024 CMP unremarkable, , creatinine 0.86, WBC count 3.5,  hemoglobin 13.7, MCV 99.8, platelet count 41704, ANC 2.18.  02/15/2024:  Creatinine 1.08, albumin 3.5, calcium 9.6, alkaline phosphatase 95, total bilirubin 0.5, AST 40, ALT 49, , WBC count 3.47, hemoglobin 14.2, .7, platelet count 82477, ANC 2.18.  12/18/2023 creatinine 0.85, albumin 3.4, calcium 9.4, LFTs unremarkable, WBC count 3.8, hemoglobin 14.2, .5, platelet count 130, ANC 2.94.  10/09/2023 creatinine 0.8, LFTs unremarkable, uric acid 6.2, , WBC count 3.1, hemoglobin 13.8, .7, platelet count 94394, ANC 2.16.  08/14/2023 CMP unremarkable, , uric acid 6.8, WBC count 4.23, hemoglobin 13.6, , platelet count 230039, ANC 2.87.  06/14/23:  wbc 3.26, hgb 13.9, plt 102, ANC 2.21 Cr 1.14 total bili 0.4 AST 26 ALT 23   05/31/23: wbc 3.69, hgb 12.9, plt 4,000, ANC 2.58  05/26/23: cr 1.11, alb 3.6, ca 9.3, LFTs WNL, , uric acid 6.7, mag 2.1, phosphorus 2.7  05/03/2023:  Creatinine 0.86, albumin 3.6, calcium 9.3, alkaline phosphatase 61, total bili 0.5, AST 19, ALT 16, magnesium 2.2, phosphorus 3.2, , uric acid 6.5, WBC count 2.6, hemoglobin 12.7, .3, platelet count 124, ANC 1.69.  04/12/2023:  Creatinine 0.83, albumin 3.5, calcium 9.2, , uric acid 3.8, LFTs within normal limits, magnesium 2.0, phosphorus 2.6, WBC count 2.7, hemoglobin 12.6, ANC 2000, ALC 0.28.  03/28/23: CMP unremarkable except ca 10.2, mag 2.2, phosphorus 3.0, , uric acid 4.2, wbc 3.43, hgb 13.3, plt 78, ANC 2.48  03/10/23: CMP unremarkable, wbc 2.57, hgb 12.8, plt 87, ANC 1.85  03/01/23: cr 0.96, ca 9.2, alb 3.4, AST 35, ALT 24, , mag 1.9, phosphorus 3.4, uric acid 3.8, wbc 3.23, hgb 12.5, plt 74,000, ANC 2.39  02/01/23: Cr 0.92, ca 9.6, alb 3.6, LFTs WNL, wbc 3.08, hgb 12.6, plt 102, ANC 2.15, ALC 0.25  01/20/23; cr 0.97, alb 3.5, LFTs WNL, wbc 3.06, hgb 12.8, plt 64, ANC 2.25  11/22/22: cr 0.86, alb 3.7, LFTs WNL, , uric acid 5.1, wbc 2.16, hgb 12.8, plt  76, mcv 107.8, ALC 0.35, ANC 1.11  10/25/2022:  Creatinine 0.89, albumin 3.5, LFTs within normal limits, , uric acid 3.6.  WBC count 3.15, hemoglobin 12.7, .3, platelet count 125 1000, ANC 2.28.  10/11/2022:  Creatinine 1.04, albumin 3.6, LFTs within normal limits, magnesium 2.0, phosphorus 2.7, , uric acid 3.2, WBC 2.4, hemoglobin 13.4, .5, platelet count 72806.  ANC 1.77.  09/13/2022:  Creatinine 0.80, albumin 3.7, LFTs within normal limits, , uric acid 4.0, WBC count 2.37, hemoglobin 13.2, , platelet count 51699, ANC 1.45.  08/16/2022:  Creatinine 0.81, albumin 3.8, calcium 9.2, total bili 0.5, LFTs within normal limits, uric acid 3.3, , WBC 3.45, hemoglobin 13.7, .8, platelet count 01394, ANC 2.06.  07/19/2022:  WBC 3.75, hemoglobin 13.1, .5, platelet count 29425, ANC 2.32, uric acid 4.1, , creatinine 1.09, albumin 3.5, calcium 9.6, LFTs within normal limits.  06/21/2022 WBC at 2.58 bed neutrophil adequate at 55%.  Hemoglobin 12.7, hematocrit 39.4.  06/14/2022:  Creatinine 0.99, calcium 9.1, , uric acid 3.7, phosphorus 3.3, potassium 4.5, WBC count 2.58, hemoglobin 12.7, platelet count 50973, ANC 1.44.  05/27/2022:  Potassium 4.9, sodium 140, creatinine 0.87, , uric acid 6.6.  05/17/2022:  Creatinine 1.03, albumin 3.3, ,  WBC count 10.32, hemoglobin 12.4, platelet count 135 1000.   05/02/2022:  Creatinine 0.93, albumin 3.6, total protein 6.4, , potassium 5.2, WBC count 9.6, hemoglobin 11.4, .4, platelet count 92.   4/12/22 Cr 0.89 Alb 3.4 TP 6.4 Ca 9.4  TSH 2.0 Iron 58n TIBC 285 Ferritin 48 Folate 9.9 B12 426 Hep B s ag neg Uric acid 7.8 Hep C ab neg WBC 9.79 RBC 3.77 Hg 11.8 .5 RDW 13.4 PLT 91 ANC 2.79 ALC 6.36 Direct Ramos neg b2 micro 7.5 Copper 135 Hapto 225 Hep B core ab neg SPEP w/ ARABELLA normal Abs kappa 32 Abs lambda 124 SFLC ratio 0.26  3/18/22 Cr 1 AST 27 ALT 12 AlkPhos 87 Alb 4.1 TP 6.5  Ca 9.8 WBC 9.4 RBC 3.92 Hg 12.2 MCV 96 RDW 15.1  ALC 5.8 ANC 3  5/15/18 WBC 14.71 RBC 4.42 Hg 14.3 MCV 98.6 RDW 13  ANC 2.91 ALC 10.13 AMC 1.29    Imagin2024 PET-CT:  Interval progression of lymphoma with increased size and number on metabolically activity of multiple hypermetabolic lymph nodes above and below the diaphragm.  Interval increased size of spleen with diffuse moderate intense uptake also concerning for lymphomatous involvement.  No other suspicious uptake    2023 PET-CT:  Interval development of mild-to-moderate uptake in multiple small lymph nodes in the chest and abdomen which is somewhat concerning given the history of lymphoma, continued close monitoring is recommended.  Interval development of nonspecific mild diffuse splenic uptake clinical recommendation is recommended.  Interval development of subtle bilateral ground-glass opacities with mild uptake, which is being favored being inflammatory in etiology, attention on follow-up    2023 PET-CT:  Stable PET-CT without evidence of metabolically active residual or recurrent lymphoma.    2023:  PET-CT-interval resolution of moderate uptake in right axillary lymph node with no current PET evidence of metabolically active residual or recurrent lymphoma.    22 PET CT: Stable right axillary lymph node with mild-to-moderate uptake, representing residual metabolically active tumor. No new suspicious uptake noted     2022 PET-CT:  Excellent response to therapy since her last exam with interval resolution or near complete resolution of multiple hypermetabolic lymph nodes above and below the diaphragm.  There is residual mild-to-moderate uptake in the right axillary lymph node, Deauville score 4  Interval resolution of diffuse uptake in spleen which is also decreased in size.  No new suspicious uptake.     05/10/22:-  right lower extremity Doppler negative for DVT       22 PET CT: multiple  hypermetabolic LNs b/l neck. Right submandibular LN .7x1.7cm, SUV 7.8. Left submandibular LN 2.3x1.7cm SUV 7.2. Multiple hypermetabolic LNs in chest. Right axillary node 3.3x2cm SUV 8.1. Hypermetabolic left axillary LN SUV 6.4, 1.9x3.2cm. Hypermetabolic right paratracheal LN 2.4x1.6cm SUV 4.9. Right pulmonary hilum SUV 2.7. Subcarinal LN SUV 3.3, 1.2x3.3cm. Spleen enlarged at 15cm and demonstrates heterogeneous mild to moderate uptake SUV 5.2. Moderately intense activity in stomach and proximal duodenum with no degree of focality or discrete mass on CT. Multiple hypermetabolic LNs in abd and pelvis. Left periaortic LN SUV 6.8, 4x3.5cm. Large hypermetabolic mesenteric mass [id number] SUV 8.5. Aortocaval LN 3.5x3.8cm SUV 5.1. Hypermetabolic mass in left pelvis 5.5x7cm SUV 7. Left external iliac LN SUV 6 2.2x2.1cm. Extensive hypermetabolism anteriorly and laterally in left abdomen, associated with soft tissue thickening that is difficult to separate from bowel loops. B/l inguinal LNs up to 2.9x3.9cm, SUV 6.6    3/29/22 CT neck w/ and w/o contrast: widespread adenopathy identified in parotic glands bilateral, submandibular space, anterior and posterior cervical chains, superior mediastinum and supraclavicular and infraclavicular spaces of the base of neck/upper chest in appearance strongly suspicious for lymphoma. Multiple subcutaneous masses are also identified in lower occipital scalp and upper neck similar to lymphadenopathy. Biopsy of prominent subcutaneous mass in right upper neck is recommended and should present the easiest location for tissue sampling    Path:  5/23/18 peripheral blood flow cytometry: CD5+ B cell population. No abnormal T cell or myeloid cell populations. CD19+, CD20+, CD5+, CD23+, CD11c+ surface kappa light chain+, CD38-, CD22+ (dim/mod), CD43+, FMC7-. Consistent with CLL     04/26/2022:  Bone marrow biopsy-marrow -trilineage hematopoiesis with 10-20% B-cell infiltrate, C5 positive B-cell  population with immunophenotype typical for CLL.  CLL fish within normal limits, heavy chain mutation status cannot be determined.  6q, chromosome 12, chromosome 13, chromosome 11, chromosome 17 and t( 11;14) abnormalities not detected.     05/06/2022 left axillary lymph node core biopsy:  Mantle cell lymphoma with a subset consistent with CLL/SLL., CD5 positive monoclonal B-cell population is identified, approximately 91% of total cells, immuno phenotype is nonspecific and may be seen in CLL, marginal zone lymphoma, or large cell lymphoma.  A 2nd small monoclonal B-cell population approximately 2% of the cells identified with an immunophenotype consistent with CLL.  New genomic consultation lymphoma cells positive for CD 20, CD 5, cyclin D1 and BCL2 with a TI 67 of 10%.  There is no significant staining for CD10, BCL6.  However workup consistent with diagnosis of mantle cell lymphoma.        Past Medical History:   Diagnosis Date    Cataract     Lymphoma        Past Surgical History:   Procedure Laterality Date    HERNIA REPAIR         Family History   Problem Relation Name Age of Onset    Kidney disease Mother      Colon cancer Father      Colon cancer Sister      Breast cancer Sister      Uterine cancer Sister         Social History     Socioeconomic History    Marital status:    Tobacco Use    Smoking status: Never    Smokeless tobacco: Never   Substance and Sexual Activity    Alcohol use: Not Currently    Drug use: Never       Current Outpatient Medications   Medication Sig Dispense Refill    allopurinoL (ZYLOPRIM) 300 MG tablet Take 1 tablet (300 mg total) by mouth once daily. 30 tablet 5    aspirin (ECOTRIN) 81 MG EC tablet Take 81 mg by mouth once daily.      carvediloL (COREG) 12.5 MG tablet Take 1 tablet by mouth 2 (two) times a day.      EScitalopram oxalate (LEXAPRO) 10 MG tablet Take 1 tablet by mouth once daily.      ezetimibe (ZETIA) 10 mg tablet Take 10 mg by mouth once daily.       lenalidomide (REVLIMID) 10 mg Cap Take 10 mg by mouth As instructed TAKE 1 CAPSULE DAYS 1-21 EVERY 28 DAYS.. 21 each 0    ondansetron (ZOFRAN) 8 MG tablet Take 1 tablet (8 mg total) by mouth every 8 (eight) hours as needed for Nausea. 30 tablet 1    ondansetron (ZOFRAN-ODT) 4 MG TbDL Take 1 tablet (4 mg total) by mouth every 8 (eight) hours as needed. 30 tablet 2    pregabalin (LYRICA) 50 MG capsule pregabalin 50 mg capsule   TAKE 1 CAPSULE BY MOUTH TWICE DAILY      rosuvastatin (CRESTOR) 20 MG tablet Take 20 mg by mouth once daily.      traMADoL (ULTRAM) 50 mg tablet Take 1 tablet (50 mg total) by mouth every 6 (six) hours as needed for Pain. 30 tablet 0    LIDOcaine-prilocaine (EMLA) cream Apply topically as needed. Apply Cream to Mediport 60 minutes prior to infusion. 30 g 5    valACYclovir (VALTREX) 500 MG tablet Take 1 tablet (500 mg total) by mouth 2 (two) times daily. 60 tablet 3     No current facility-administered medications for this visit.       Review of patient's allergies indicates:   Allergen Reactions    Pregabalin           ROS  CONSTITUTIONAL: no fevers, no chills,  No weight loss, no  fatigue, no weakness  HEMATOLOGIC: no abnormal bleeding, no abnormal bruising, no drenching night sweats  ONCOLOGIC: no new masses or lumps  HEENT: no vision loss, no tinnitus or hearing loss, no nose bleeding, no dysphagia, no odynophagia  CVS: no chest pain, no palpitations, no dyspnea on exertion  RESP: no shortness of breath, no hemoptysis, no cough  GI: no nausea, no vomiting, no diarrhea, no constipation, no melena, no hematochezia, no hematemesis, no abdominal pain, no increase in abdominal girth  : no dysuria, no hematuria, no hesitancy, no scrotal swelling, no discharge  INTEGUMENT: no rashes, no abnormal bruising, no nail pitting, no hyperpigmentation  NEURO: no falls, no memory loss, no paresthesias or dysesthesias, no urofecal incontinence or retention, no loss of strength on any extremity  MSK: no back  pain, no new joint pain, no joint swelling  PSYCH: no suicidal or homicidal ideation, no depression, no insomnia, no anhedonia  ENDOCRINE: no heat or cold intolerance, no polyuria, no polydipsia          Physical exam     Vitals:    05/31/24 1014   BP: 99/65   Pulse: 71   Resp: 14   Temp: 98 °F (36.7 °C)         GEN: AAOx3, NAD  HEENT: PERRLA, EOMI, edentulous, no oral ulcers  NECK:  Supple, full range of motion  LYMPH:  No axillary, supra clavicle or cervical adenopathy appreciated on today's exam.  CVS: s1s2 RRR, no M/R/G, port in place in right chest wall without surrounding edema or erythema.  RESP: CTA b/l, no crackles, no wheezes or rhonchi, well-healed herpes zoster rash.  ABD: soft, NT, ND, BS+, no hepatomegaly, mild splenomegaly,   EXT: no deformities, right lower extremity edema.  SKIN:  No rash,.  No evidence of petechia.  NEURO: normal mentation, strength 5/5 on all 4 extremities, no sensory deficits       ASSESSMENT:     # Mantle cell lymphoma, [place]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  Positive for CLL.  PET-CT with evidence of bulky lymphadenopathy, largest measuring 14 cm, spleen measuring 15 cm.  Discussed with patient and his family the diagnosis, natural history and treatment options.  Discussed the risk for TLS given bulky lymphadenopathy, perforation given lymphadenopathy around the bowel loops as well as declining ECOG given his advanced age.  Continuet PJP prophylaxis with Bactrim Monday Wednesday Friday and valacyclovir with treatment.  Continue allopurinol 300 mg q.day for TLS prophylaxis.  Patient is status post cycle 1 of bendamustine and rituximab on 05/18/2022.  Patient has bendamustine was reduced by 25% and rituximab was given 1 week apart to reduce the risk of perforation.  S/p cycle 2 and cycle 3 without significant side effects, dosed for bendamustine increased to  100%.  Discussed with patient interim PET-CT with excellent response to treatment however with residual hypermetabolism in right axillary lymph node with a Deauville score of 4.  Given his excellent tolerance of treatment, will proceed to complete 6 cycles of BR in the setting of above PET-CT report.  Discussed alternate treatment plan to proceed with ibrutinib given partial response.  Patient elected to proceed with another 2 cycles of BR with plans to repeat PET-CT after completion of treatment to assess treatment response.  Given incomplete response on PET-CT after completion of 6 hours CA, he was started on ibrutinib in second-line initially at 560 mg q.day.    Given patient's thrombocytopenia, dose was reduced to 420 mg in March 2023  PET-CT in April 2023 with excellent response with resolution of axillary adenopathy  PET-CT in August 2023 with no evidence of disease  PET-CT in December 2023 with findings concerning for disease recurrence  Patient was started back on ibrutinib 140 mg q.day in the last week of January 2024.    Patient had multiple interruptions on ibrutinib due to persistent/worsening thrombocytopenia  PET-CT in May 2024 with findings consistent with progression of disease  Discussed with patient and family the plan to initiate 3rd line treatment with Revlimid/rituximab.  Discussed the palliative nature of treatment and incurable nature of disease.  Patient was like to proceed with therapy.     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      Based on available information, likely SLL/CLL with some constitutional symptoms including weight loss, fatigue and night sweats. Adenopathy easily palpable above and below the diaphragm. Mild thrombocytopenia w/ PLT count ~120k and very mild anemia with Hg ~12 g/dl. No hypercalcemia, no evidence of immune paresis  Biopsies 4/11/22 with Dr Brannon were FNAs and non diagnostic for CLL. I do not know what IHC was obtained or whether  pathologist knew what the potential diagnosis was as report only says negative for carcinoma. Will discuss with Dr Lan to add CD5, CD10, CD19, CD20 etc if enough tissue  Regardless, will also refer for axillary LN biopsy, either excisional or at the very least core. Referral sent today  PET CT 4/2022 findings are noted. Given his advanced age he was not refer for EGD. Masses in abdomen are likely matted gurinder conglomerates and I do not think it would be worth it to attempt a biopsy of those  CLL FISH  Negative , IGHV mutation status  Hep B and C negative  Allopurinol 300mg daily with PO hydration prior to initiation of any therapy  Given concomitant diagnosis of CLL and mantle cell lymphoma will plan to initiate treatment for mantle cell lymphoma and CLL with bendamustine rituximab.  Will consider BTK inhibitors if patient is intolerant to bendamustine/rituximab in the second-line      Plan:  Chemo education   Plan to initiate rituximab and Revlimid on 06/03/2024.  Orders placed for insurance authorization today   Will send a prescription for Revlimid to specialty pharmacy  Will dose reduce Revlimid to 10 mg days 1-21 every 28 days due to thrombocytopenia and mild leukopenia.  Plan to follow-up in clinic prior to cycle 1 day 8 rituximab to assess treatment tolerance  Labs including LDH and uric acid prior to every treatment  Will obtain hepatitis panel prior to initiating rituximab on 06/03/2024.    Chemotherapy Education, 5/31/2024:    Mr. Valero is here today with his wife and daughter for his chemotherapy education for Mantle Cell Lymphoma.  This treatment is third-line palliative intent.  He will receive Rituximab IV on cycle 1 day 1, and cycle 1 days 8 15, and 22, Rituxan IV Hycela, then cycle 2-6, day 1 Rituxan hycela IV.   Lenalidomide PO on days 1-21 of 28 day cycle for 12 cycles.  We discussed the side effects of both drugs, how to manage the side effects, dosage, timing, and when he should notify the  clinic of adverse side effects.  Nausea medication Ondansetron was sent to his pharmacy with instructions on how to take.  Patient was instructed to purchase OTC Imodium with instructions on how to take.  [facility] Teaching Sheets were reviewed with the patient and a copy given for his reference.  The patient was given a teaching binder with the 24-hour clinic number.  He verbalized the risks and benefits of this treatment.  All questions were answered.  Informed consent was reviewed and signed by the patient.    Blood work to be done by Home Health.  Lenalidomide has not been delivered as of today.  Patient advised to call our office Monday, Romy 3, 2024, if Lenalidomide is not delivered this weekend.  He will not do blood work Sunday, June 2, 2024, if medication not delivered.    DISCUSSION:    1.  A total of 60 minutes were spent in counseling today, in which 100% were face-to-face.  At today's therapy teaching session, we discussed the patient's cancer diagnosis as well as planned therapy regimen, protocol, side effects and toxicities.  A handout of each therapeutic agent in the regimen was provided and reviewed in detail.    2.  The following side effects were discussed but not limited to:                a.  Discussed the risk of infection while on therapy related to pancytopenia, specifically a decrease in their white blood cell count.  Instructed to contact our office for temperature >100.4 F, chills, sudden onset cough or shortness of breath, symptoms of a urinary tract infection.                b.  Discussed the risk of anemia. Instructed to contact our office for dizziness, heart palpitations, or extreme or sudden changes in weakness.                c.  Discussed the risk of thrombocytopenia, which increases the risk of bruising or bleeding.  Instructed the patient to contact our office for spontaneous signs of bleeding, including nose bleeds, bleeding from the gums or mouth, blood in sputum, urine or  stool and unusual or excessive bruising or rash.                d.  Discussed GI side effects including weight changes, changes in appetite, altered sense of taste, stomatitis, nausea, vomiting, diarrhea, constipation, and heartburn.                e.  Discussed  side effects including painful urination, changes in the amount of urination, possible urine color changes.  Discussed fertility issues and to prevent  pregnancy if of child bearing age.                f.  Discussed neurological side effects including the risk of peripheral neuropathy, either temporary or permanent.                g.  Discussed the potential for skin, hair, and nail changes.       3.  Instructed to contact our office for discussion of medication changes, the addition of vitamin and/or herbal supplementation as they may interact with some chemotherapy agents.    4.  Discussed dietary modifications and the need to maintain adequate caloric intake and proper oral hydration.  Recommended 64 ounces of fluid per day.    5.  Discussed anti-emetic protocol and bowel regimen protocol.    6.  Office contact information given including after hours number.  Discussed there is an oncologist on call 24/7, 365 days including weekends.  Provided primary nurse's information .    7.  In summary, the patient is in agreement with the plan of care.  Questions appeared to be answered to their satisfaction. Consented the patient to the treatment plan and the patient was educated on the planned duration of the treatment and schedule of the treatment administration. Copy to be scanned into the chart.    All questions answered to the satisfaction of the patient and family.     Follow up appointments given to patient.      The patient is in agreement with today's plan of care.  All questions answered.  Patient is aware to contact our office for any problems or concerns prior to next visit.      Camille Roblero, MSN-ED, APRN, AGACNP-BC, OCN

## 2024-06-03 RX ORDER — DIPHENHYDRAMINE HCL 25 MG
50 CAPSULE ORAL
Status: CANCELLED | OUTPATIENT
Start: 2024-06-10

## 2024-06-03 RX ORDER — HEPARIN 100 UNIT/ML
500 SYRINGE INTRAVENOUS
Status: CANCELLED | OUTPATIENT
Start: 2024-06-17

## 2024-06-03 RX ORDER — HEPARIN 100 UNIT/ML
500 SYRINGE INTRAVENOUS
Status: CANCELLED | OUTPATIENT
Start: 2024-06-24

## 2024-06-03 RX ORDER — HEPARIN 100 UNIT/ML
500 SYRINGE INTRAVENOUS
Status: CANCELLED | OUTPATIENT
Start: 2024-06-03

## 2024-06-03 RX ORDER — SODIUM CHLORIDE 0.9 % (FLUSH) 0.9 %
10 SYRINGE (ML) INJECTION
Status: CANCELLED | OUTPATIENT
Start: 2024-06-17

## 2024-06-03 RX ORDER — SODIUM CHLORIDE 0.9 % (FLUSH) 0.9 %
10 SYRINGE (ML) INJECTION
Status: CANCELLED | OUTPATIENT
Start: 2024-06-10

## 2024-06-03 RX ORDER — DIPHENHYDRAMINE HCL 25 MG
50 CAPSULE ORAL
Status: CANCELLED | OUTPATIENT
Start: 2024-06-24

## 2024-06-03 RX ORDER — HEPARIN 100 UNIT/ML
500 SYRINGE INTRAVENOUS
Status: CANCELLED | OUTPATIENT
Start: 2024-06-10

## 2024-06-03 RX ORDER — ACETAMINOPHEN 325 MG/1
650 TABLET ORAL
Status: CANCELLED | OUTPATIENT
Start: 2024-06-10

## 2024-06-03 RX ORDER — FAMOTIDINE 20 MG/1
20 TABLET, FILM COATED ORAL
Status: CANCELLED | OUTPATIENT
Start: 2024-06-24

## 2024-06-03 RX ORDER — FAMOTIDINE 10 MG/ML
20 INJECTION INTRAVENOUS
Status: CANCELLED | OUTPATIENT
Start: 2024-06-03

## 2024-06-03 RX ORDER — MEPERIDINE HYDROCHLORIDE 50 MG/ML
25 INJECTION INTRAMUSCULAR; INTRAVENOUS; SUBCUTANEOUS
Status: CANCELLED | OUTPATIENT
Start: 2024-06-03

## 2024-06-03 RX ORDER — SODIUM CHLORIDE 0.9 % (FLUSH) 0.9 %
10 SYRINGE (ML) INJECTION
Status: CANCELLED | OUTPATIENT
Start: 2024-06-03

## 2024-06-03 RX ORDER — DIPHENHYDRAMINE HCL 25 MG
50 CAPSULE ORAL
Status: CANCELLED | OUTPATIENT
Start: 2024-06-17

## 2024-06-03 RX ORDER — MEPERIDINE HYDROCHLORIDE 50 MG/ML
25 INJECTION INTRAMUSCULAR; INTRAVENOUS; SUBCUTANEOUS
Status: CANCELLED | OUTPATIENT
Start: 2024-06-17

## 2024-06-03 RX ORDER — SODIUM CHLORIDE 0.9 % (FLUSH) 0.9 %
10 SYRINGE (ML) INJECTION
Status: CANCELLED | OUTPATIENT
Start: 2024-06-24

## 2024-06-03 RX ORDER — ACETAMINOPHEN 325 MG/1
650 TABLET ORAL
Status: CANCELLED | OUTPATIENT
Start: 2024-06-03

## 2024-06-03 RX ORDER — FAMOTIDINE 20 MG/1
20 TABLET, FILM COATED ORAL
Status: CANCELLED | OUTPATIENT
Start: 2024-06-10

## 2024-06-03 RX ORDER — ACETAMINOPHEN 325 MG/1
650 TABLET ORAL
Status: CANCELLED | OUTPATIENT
Start: 2024-06-17

## 2024-06-03 RX ORDER — FAMOTIDINE 20 MG/1
20 TABLET, FILM COATED ORAL
Status: CANCELLED | OUTPATIENT
Start: 2024-06-17

## 2024-06-03 RX ORDER — MEPERIDINE HYDROCHLORIDE 50 MG/ML
25 INJECTION INTRAMUSCULAR; INTRAVENOUS; SUBCUTANEOUS
Status: CANCELLED | OUTPATIENT
Start: 2024-06-10

## 2024-06-03 RX ORDER — MEPERIDINE HYDROCHLORIDE 50 MG/ML
25 INJECTION INTRAMUSCULAR; INTRAVENOUS; SUBCUTANEOUS
Status: CANCELLED | OUTPATIENT
Start: 2024-06-24

## 2024-06-03 RX ORDER — ACETAMINOPHEN 325 MG/1
650 TABLET ORAL
Status: CANCELLED | OUTPATIENT
Start: 2024-06-24

## 2024-06-10 ENCOUNTER — OFFICE VISIT (OUTPATIENT)
Dept: HEMATOLOGY/ONCOLOGY | Facility: CLINIC | Age: 84
End: 2024-06-10
Payer: MEDICARE

## 2024-06-10 VITALS
HEIGHT: 64 IN | OXYGEN SATURATION: 97 % | BODY MASS INDEX: 29.48 KG/M2 | SYSTOLIC BLOOD PRESSURE: 117 MMHG | TEMPERATURE: 97 F | RESPIRATION RATE: 14 BRPM | HEART RATE: 73 BPM | DIASTOLIC BLOOD PRESSURE: 73 MMHG | WEIGHT: 172.69 LBS

## 2024-06-10 DIAGNOSIS — D69.6 THROMBOCYTOPENIA: ICD-10-CM

## 2024-06-10 DIAGNOSIS — Z51.11 ENCOUNTER FOR ANTINEOPLASTIC CHEMOTHERAPY: ICD-10-CM

## 2024-06-10 DIAGNOSIS — T45.1X5A IMMUNODEFICIENCY DUE TO CHEMOTHERAPY: ICD-10-CM

## 2024-06-10 DIAGNOSIS — D84.821 IMMUNODEFICIENCY DUE TO CHEMOTHERAPY: ICD-10-CM

## 2024-06-10 DIAGNOSIS — C83.13 MANTLE CELL LYMPHOMA OF INTRA-ABDOMINAL LYMPH NODES: Primary | ICD-10-CM

## 2024-06-10 DIAGNOSIS — Z79.899 IMMUNODEFICIENCY DUE TO CHEMOTHERAPY: ICD-10-CM

## 2024-06-10 PROCEDURE — 99215 OFFICE O/P EST HI 40 MIN: CPT | Mod: ,,, | Performed by: STUDENT IN AN ORGANIZED HEALTH CARE EDUCATION/TRAINING PROGRAM

## 2024-06-10 RX ORDER — MEPERIDINE HYDROCHLORIDE 50 MG/ML
25 INJECTION INTRAMUSCULAR; INTRAVENOUS; SUBCUTANEOUS
Status: CANCELLED | OUTPATIENT
Start: 2024-06-10

## 2024-06-10 RX ORDER — FAMOTIDINE 20 MG/1
20 TABLET, FILM COATED ORAL
Status: CANCELLED | OUTPATIENT
Start: 2024-06-10

## 2024-06-10 RX ORDER — DIPHENHYDRAMINE HCL 25 MG
50 CAPSULE ORAL
Status: CANCELLED | OUTPATIENT
Start: 2024-06-10

## 2024-06-10 RX ORDER — SODIUM CHLORIDE 0.9 % (FLUSH) 0.9 %
10 SYRINGE (ML) INJECTION
Status: CANCELLED | OUTPATIENT
Start: 2024-06-10

## 2024-06-10 RX ORDER — HEPARIN 100 UNIT/ML
500 SYRINGE INTRAVENOUS
Status: CANCELLED | OUTPATIENT
Start: 2024-06-10

## 2024-06-10 RX ORDER — ACETAMINOPHEN 325 MG/1
650 TABLET ORAL
Status: CANCELLED | OUTPATIENT
Start: 2024-06-10

## 2024-06-10 NOTE — PROGRESS NOTES
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     Treatment history    05/18/2022-10/26/2022:  6 cycles of bendamustine rituximab  December 2022:  Ibrutinib 560 mg q.day  03/16/2023-07/18/2023:  Ibrutinib 420 mg q.d.  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 for lymphocytosis and lymphadenopathy    Of note, review of his records reveals a flow cytometry sent in 2018 that was consistent with CLL. He also had a single visit with Dr Snow in 2018 but never followed back.   Patient had a PET-CT as well as a bone marrow biopsy and repeat peripheral flow cytometry with a diagnosis of CLL/SLL and mantle cell lymphoma.  Patient had symptoms of fatigue and weight loss prior to his workup and diagnosis.  He was diagnosed with mantle cell lymphoma and CLL and started on treatment with bendamustine rituximab, with incomplete response following which he was started on ibrutinib.      Interval history     Today, 06/10/2024, patient denies any acute concerns today.  He reports tolerating his 1st cycle of rituximab and Revlimid well without any significant side effects.  He denies any new symptoms of pain, decreased appetite, weight loss, fevers, chills.  He denies any new lumps or bumps.  He reports improvement in his energy level since his last follow-up visit with us.  Patient was daughter reports swelling of his abdomen for 2 days after treatment which resolved soon after.    Labs:  06/09/2024 creatinine 0.8, albumin 3.6, LFTs unremarkable, LDH 02/08/2028, uric acid 6.9, hepatitis-B surface antigen  nonreactive, WBC count 2.97, hemoglobin 13.5, platelet count 78417, ANC 2.31.    05/09/2024 WBC count 3.47, hemoglobin 13.8, platelet count 57166, ANC 2.37  04/11/2024 creatinine 0.96, albumin 3.3, LFTs unremarkable, , TSH 2.7, folic acid 11.4, vitamin B12 479, WBC count 2.7, hemoglobin 13.3, MCV 99, platelet count 19071  03/14/2024 CMP unremarkable, , creatinine 0.86, WBC count 3.5, hemoglobin 13.7, MCV 99.8, platelet count 22907, ANC 2.18.  02/15/2024:  Creatinine 1.08, albumin 3.5, calcium 9.6, alkaline phosphatase 95, total bilirubin 0.5, AST 40, ALT 49, , WBC count 3.47, hemoglobin 14.2, .7, platelet count 01631, ANC 2.18.  12/18/2023 creatinine 0.85, albumin 3.4, calcium 9.4, LFTs unremarkable, WBC count 3.8, hemoglobin 14.2, .5, platelet count 130, ANC 2.94.  10/09/2023 creatinine 0.8, LFTs unremarkable, uric acid 6.2, , WBC count 3.1, hemoglobin 13.8, .7, platelet count 76085, ANC 2.16.  08/14/2023 CMP unremarkable, , uric acid 6.8, WBC count 4.23, hemoglobin 13.6, , platelet count [id number], ANC 2.87.  06/14/23:  wbc 3.26, hgb 13.9, plt 102, ANC 2.21 Cr 1.14 total bili 0.4 AST 26 ALT 23   05/31/23: wbc 3.69, hgb 12.9, plt 4,000, ANC 2.58  05/26/23: cr 1.11, alb 3.6, ca 9.3, LFTs WNL, , uric acid 6.7, mag 2.1, phosphorus 2.7  05/03/2023:  Creatinine 0.86, albumin 3.6, calcium 9.3, alkaline phosphatase 61, total bili 0.5, AST 19, ALT 16, magnesium 2.2, phosphorus 3.2, , uric acid 6.5, WBC count 2.6, hemoglobin 12.7, .3, platelet count 124, ANC 1.69.  04/12/2023:  Creatinine 0.83, albumin 3.5, calcium 9.2, , uric acid 3.8, LFTs within normal limits, magnesium 2.0, phosphorus 2.6, WBC count 2.7, hemoglobin 12.6, ANC 2000, ALC 0.28.  03/28/23: CMP unremarkable except ca 10.2, mag 2.2, phosphorus 3.0, , uric acid 4.2, wbc 3.43, hgb 13.3, plt 78, ANC 2.48  03/10/23: CMP unremarkable, wbc 2.57, hgb 12.8, plt 87, ANC  1.85  03/01/23: cr 0.96, ca 9.2, alb 3.4, AST 35, ALT 24, , mag 1.9, phosphorus 3.4, uric acid 3.8, wbc 3.23, hgb 12.5, plt 74,000, ANC 2.39  02/01/23: Cr 0.92, ca 9.6, alb 3.6, LFTs WNL, wbc 3.08, hgb 12.6, plt 102, ANC 2.15, ALC 0.25  01/20/23; cr 0.97, alb 3.5, LFTs WNL, wbc 3.06, hgb 12.8, plt 64, ANC 2.25  11/22/22: cr 0.86, alb 3.7, LFTs WNL, , uric acid 5.1, wbc 2.16, hgb 12.8, plt 76, mcv 107.8, ALC 0.35, ANC 1.11  10/25/2022:  Creatinine 0.89, albumin 3.5, LFTs within normal limits, , uric acid 3.6.  WBC count 3.15, hemoglobin 12.7, .3, platelet count 125 1000, ANC 2.28.  10/11/2022:  Creatinine 1.04, albumin 3.6, LFTs within normal limits, magnesium 2.0, phosphorus 2.7, , uric acid 3.2, WBC 2.4, hemoglobin 13.4, .5, platelet count 76936.  ANC 1.77.  09/13/2022:  Creatinine 0.80, albumin 3.7, LFTs within normal limits, , uric acid 4.0, WBC count 2.37, hemoglobin 13.2, , platelet count 57920, ANC 1.45.  08/16/2022:  Creatinine 0.81, albumin 3.8, calcium 9.2, total bili 0.5, LFTs within normal limits, uric acid 3.3, , WBC 3.45, hemoglobin 13.7, .8, platelet count 27675, ANC 2.06.  07/19/2022:  WBC 3.75, hemoglobin 13.1, .5, platelet count 35496, ANC 2.32, uric acid 4.1, , creatinine 1.09, albumin 3.5, calcium 9.6, LFTs within normal limits.  06/21/2022 WBC at 2.58 bed neutrophil adequate at 55%.  Hemoglobin 12.7, hematocrit 39.4.  06/14/2022:  Creatinine 0.99, calcium 9.1, , uric acid 3.7, phosphorus 3.3, potassium 4.5, WBC count 2.58, hemoglobin 12.7, platelet count 97648, ANC 1.44.  05/27/2022:  Potassium 4.9, sodium 140, creatinine 0.87, , uric acid 6.6.  05/17/2022:  Creatinine 1.03, albumin 3.3, ,  WBC count 10.32, hemoglobin 12.4, platelet count 135 1000.   05/02/2022:  Creatinine 0.93, albumin 3.6, total protein 6.4, , potassium 5.2, WBC count 9.6, hemoglobin 11.4, .4, platelet count  92.   22 Cr 0.89 Alb 3.4 TP 6.4 Ca 9.4  TSH 2.0 Iron 58n TIBC 285 Ferritin 48 Folate 9.9 B12 426 Hep B s ag neg Uric acid 7.8 Hep C ab neg WBC 9.79 RBC 3.77 Hg 11.8 .5 RDW 13.4 PLT 91 ANC 2.79 ALC 6.36 Direct Ramos neg b2 micro 7.5 Copper 135 Hapto 225 Hep B core ab neg SPEP w/ ARABELLA normal Abs kappa 32 Abs lambda 124 SFLC ratio 0.26  3/18/22 Cr 1 AST 27 ALT 12 AlkPhos 87 Alb 4.1 TP 6.5 Ca 9.8 WBC 9.4 RBC 3.92 Hg 12.2 MCV 96 RDW 15.1  ALC 5.8 ANC 3  5/15/18 WBC 14.71 RBC 4.42 Hg 14.3 MCV 98.6 RDW 13  ANC 2.91 ALC 10.13 AMC 1.29    Imagin2024 PET-CT:  Interval progression of lymphoma with increased size and number on metabolically activity of multiple hypermetabolic lymph nodes above and below the diaphragm.  Interval increased size of spleen with diffuse moderate intense uptake also concerning for lymphomatous involvement.  No other suspicious uptake    2023 PET-CT:  Interval development of mild-to-moderate uptake in multiple small lymph nodes in the chest and abdomen which is somewhat concerning given the history of lymphoma, continued close monitoring is recommended.  Interval development of nonspecific mild diffuse splenic uptake clinical recommendation is recommended.  Interval development of subtle bilateral ground-glass opacities with mild uptake, which is being favored being inflammatory in etiology, attention on follow-up    2023 PET-CT:  Stable PET-CT without evidence of metabolically active residual or recurrent lymphoma.    2023:  PET-CT-interval resolution of moderate uptake in right axillary lymph node with no current PET evidence of metabolically active residual or recurrent lymphoma.    22 PET CT: Stable right axillary lymph node with mild-to-moderate uptake, representing residual metabolically active tumor. No new suspicious uptake noted     2022 PET-CT:  Excellent response to therapy since her last exam with interval resolution  or near complete resolution of multiple hypermetabolic lymph nodes above and below the diaphragm.  There is residual mild-to-moderate uptake in the right axillary lymph node, Deauville score 4  Interval resolution of diffuse uptake in spleen which is also decreased in size.  No new suspicious uptake.     05/10/22:-  right lower extremity Doppler negative for DVT       4/13/22 PET CT: multiple hypermetabolic LNs b/l neck. Right submandibular LN .7x1.7cm, SUV 7.8. Left submandibular LN 2.3x1.7cm SUV 7.2. Multiple hypermetabolic LNs in chest. Right axillary node 3.3x2cm SUV 8.1. Hypermetabolic left axillary LN SUV 6.4, 1.9x3.2cm. Hypermetabolic right paratracheal LN 2.4x1.6cm SUV 4.9. Right pulmonary hilum SUV 2.7. Subcarinal LN SUV 3.3, 1.2x3.3cm. Spleen enlarged at 15cm and demonstrates heterogeneous mild to moderate uptake SUV 5.2. Moderately intense activity in stomach and proximal duodenum with no degree of focality or discrete mass on CT. Multiple hypermetabolic LNs in abd and pelvis. Left periaortic LN SUV 6.8, 4x3.5cm. Large hypermetabolic mesenteric mass [id number] SUV 8.5. Aortocaval LN 3.5x3.8cm SUV 5.1. Hypermetabolic mass in left pelvis 5.5x7cm SUV 7. Left external iliac LN SUV 6 2.2x2.1cm. Extensive hypermetabolism anteriorly and laterally in left abdomen, associated with soft tissue thickening that is difficult to separate from bowel loops. B/l inguinal LNs up to 2.9x3.9cm, SUV 6.6    3/29/22 CT neck w/ and w/o contrast: widespread adenopathy identified in parotic glands bilateral, submandibular space, anterior and posterior cervical chains, superior mediastinum and supraclavicular and infraclavicular spaces of the base of neck/upper chest in appearance strongly suspicious for lymphoma. Multiple subcutaneous masses are also identified in lower occipital scalp and upper neck similar to lymphadenopathy. Biopsy of prominent subcutaneous mass in right upper neck is recommended and should present the easiest  location for tissue sampling    Path:  5/23/18 peripheral blood flow cytometry: CD5+ B cell population. No abnormal T cell or myeloid cell populations. CD19+, CD20+, CD5+, CD23+, CD11c+ surface kappa light chain+, CD38-, CD22+ (dim/mod), CD43+, FMC7-. Consistent with CLL     04/26/2022:  Bone marrow biopsy-marrow -trilineage hematopoiesis with 10-20% B-cell infiltrate, C5 positive B-cell population with immunophenotype typical for CLL.  CLL fish within normal limits, heavy chain mutation status cannot be determined.  6q, chromosome 12, chromosome 13, chromosome 11, chromosome 17 and t( 11;14) abnormalities not detected.     05/06/2022 left axillary lymph node core biopsy:  Mantle cell lymphoma with a subset consistent with CLL/SLL., CD5 positive monoclonal B-cell population is identified, approximately 91% of total cells, immuno phenotype is nonspecific and may be seen in CLL, marginal zone lymphoma, or large cell lymphoma.  A 2nd small monoclonal B-cell population approximately 2% of the cells identified with an immunophenotype consistent with CLL.  New genomic consultation lymphoma cells positive for CD 20, CD 5, cyclin D1 and BCL2 with a TI 67 of 10%.  There is no significant staining for CD10, BCL6.  However workup consistent with diagnosis of mantle cell lymphoma.        Past Medical History:   Diagnosis Date    Cataract     Lymphoma        Past Surgical History:   Procedure Laterality Date    HERNIA REPAIR         Family History   Problem Relation Name Age of Onset    Kidney disease Mother      Colon cancer Father      Colon cancer Sister      Breast cancer Sister      Uterine cancer Sister         Social History     Socioeconomic History    Marital status:    Tobacco Use    Smoking status: Never    Smokeless tobacco: Never   Substance and Sexual Activity    Alcohol use: Not Currently    Drug use: Never       Current Outpatient Medications   Medication Sig Dispense Refill    allopurinoL (ZYLOPRIM)  300 MG tablet Take 1 tablet (300 mg total) by mouth once daily. 30 tablet 5    aspirin (ECOTRIN) 81 MG EC tablet Take 81 mg by mouth once daily.      carvediloL (COREG) 12.5 MG tablet Take 1 tablet by mouth 2 (two) times a day.      EScitalopram oxalate (LEXAPRO) 10 MG tablet Take 1 tablet by mouth once daily.      ezetimibe (ZETIA) 10 mg tablet Take 10 mg by mouth once daily.      lenalidomide (REVLIMID) 10 mg Cap Take 10 mg by mouth As instructed TAKE 1 CAPSULE DAYS 1-21 EVERY 28 DAYS.. 21 each 0    LIDOcaine-prilocaine (EMLA) cream Apply topically as needed. Apply Cream to Mediport 60 minutes prior to infusion. 30 g 5    ondansetron (ZOFRAN) 8 MG tablet Take 1 tablet (8 mg total) by mouth every 8 (eight) hours as needed for Nausea. 30 tablet 1    ondansetron (ZOFRAN-ODT) 4 MG TbDL Take 1 tablet (4 mg total) by mouth every 8 (eight) hours as needed. 30 tablet 2    pantoprazole (PROTONIX) 40 MG tablet Take 1 tablet (40 mg total) by mouth once daily. 30 tablet 1    pregabalin (LYRICA) 50 MG capsule pregabalin 50 mg capsule   TAKE 1 CAPSULE BY MOUTH TWICE DAILY      rosuvastatin (CRESTOR) 20 MG tablet Take 20 mg by mouth once daily.      traMADoL (ULTRAM) 50 mg tablet Take 1 tablet (50 mg total) by mouth every 6 (six) hours as needed for Pain. 30 tablet 0    valACYclovir (VALTREX) 500 MG tablet Take 1 tablet (500 mg total) by mouth 2 (two) times daily. 60 tablet 3     No current facility-administered medications for this visit.       Review of patient's allergies indicates:   Allergen Reactions    Pregabalin           ROS  CONSTITUTIONAL: no fevers, no chills,  No weight loss, no  fatigue, no weakness  HEMATOLOGIC: no abnormal bleeding, no abnormal bruising, no drenching night sweats  ONCOLOGIC: no new masses or lumps  HEENT: no vision loss, no tinnitus or hearing loss, no nose bleeding, no dysphagia, no odynophagia  CVS: no chest pain, no palpitations, no dyspnea on exertion  RESP: no shortness of breath, no  hemoptysis, no cough  GI: no nausea, no vomiting, no diarrhea, no constipation, no melena, no hematochezia, no hematemesis, no abdominal pain, no increase in abdominal girth  : no dysuria, no hematuria, no hesitancy, no scrotal swelling, no discharge  INTEGUMENT: no rashes, no abnormal bruising, no nail pitting, no hyperpigmentation  NEURO: no falls, no memory loss, no paresthesias or dysesthesias, no urofecal incontinence or retention, no loss of strength on any extremity  MSK: no back pain, no new joint pain, no joint swelling  PSYCH: no suicidal or homicidal ideation, no depression, no insomnia, no anhedonia  ENDOCRINE: no heat or cold intolerance, no polyuria, no polydipsia          Physical exam     Vitals:    06/10/24 0811   BP: 117/73   Pulse: 73   Resp: 14   Temp: 97.4 °F (36.3 °C)       GEN: AAOx3, NAD  HEENT: PERRLA, EOMI, edentulous, no oral ulcers  NECK:  Supple, full range of motion  LYMPH:  No axillary, supra clavicle or cervical adenopathy appreciated on today's exam.  CVS: s1s2 RRR, no M/R/G, port in place in right chest wall without surrounding edema or erythema.  RESP: CTA b/l, no crackles, no wheezes or rhonchi, well-healed herpes zoster rash.  ABD: soft, NT, ND, BS+, no hepatomegaly, mild splenomegaly,   EXT: no deformities, right lower extremity edema.  SKIN:  No rash,.  No evidence of petechia.  NEURO: normal mentation, strength 5/5 on all 4 extremities, no sensory deficits       ASSESSMENT:     # Mantle cell lymphoma, [place]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  Positive for CLL.  PET-CT with evidence of bulky lymphadenopathy, largest measuring 14 cm, spleen measuring 15 cm.  Discussed with patient and his family the diagnosis, natural history and treatment options.  Discussed the risk for TLS given bulky lymphadenopathy, perforation given lymphadenopathy around the bowel loops as well as  declining ECOG given his advanced age.  Continuet PJP prophylaxis with Bactrim Monday Wednesday Friday and valacyclovir with treatment.  Continue allopurinol 300 mg q.day for TLS prophylaxis.  Patient is status post cycle 1 of bendamustine and rituximab on 05/18/2022.  Patient has bendamustine was reduced by 25% and rituximab was given 1 week apart to reduce the risk of perforation.  S/p cycle 2 and cycle 3 without significant side effects, dosed for bendamustine increased to 100%.  Discussed with patient interim PET-CT with excellent response to treatment however with residual hypermetabolism in right axillary lymph node with a Deauville score of 4.  Given his excellent tolerance of treatment, will proceed to complete 6 cycles of BR in the setting of above PET-CT report.  Discussed alternate treatment plan to proceed with ibrutinib given partial response.  Patient elected to proceed with another 2 cycles of BR with plans to repeat PET-CT after completion of treatment to assess treatment response.  Given incomplete response on PET-CT after completion of 6 hours [place], he was started on ibrutinib in second-line initially at 560 mg q.day.    Given patient's thrombocytopenia, dose was reduced to 420 mg in March 2023  PET-CT in April 2023 with excellent response with resolution of axillary adenopathy  PET-CT in August 2023 with no evidence of disease  PET-CT in December 2023 with findings concerning for disease recurrence  Patient was started back on ibrutinib 140 mg q.day in the last week of January 2024.    Patient had multiple interruptions on ibrutinib due to persistent/worsening thrombocytopenia  PET-CT in May 2024 with findings consistent with progression of disease  Discussed with patient and family the plan to initiate 3rd line treatment with Revlimid/rituximab.  Discussed the palliative nature of treatment and incurable nature of disease.  Patient was like to proceed with therapy.  Patient was initiated rituximab  Revlimid on 06/03/2024.  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      Based on available information, likely SLL/CLL with some constitutional symptoms including weight loss, fatigue and night sweats. Adenopathy easily palpable above and below the diaphragm. Mild thrombocytopenia w/ PLT count ~120k and very mild anemia with Hg ~12 g/dl. No hypercalcemia, no evidence of immune paresis  Biopsies 4/11/22 with Dr Brannon were FNAs and non diagnostic for CLL. I do not know what IHC was obtained or whether pathologist knew what the potential diagnosis was as report only says negative for carcinoma. Will discuss with Dr Lan to add CD5, CD10, CD19, CD20 etc if enough tissue  Regardless, will also refer for axillary LN biopsy, either excisional or at the very least core. Referral sent today  PET CT 4/2022 findings are noted. Given his advanced age he was not refer for EGD. Masses in abdomen are likely matted gurinder conglomerates and I do not think it would be worth it to attempt a biopsy of those  CLL FISH  Negative , IGHV mutation status  Hep B and C negative  Allopurinol 300mg daily with PO hydration prior to initiation of any therapy  Given concomitant diagnosis of CLL and mantle cell lymphoma will plan to initiate treatment for mantle cell lymphoma and CLL with bendamustine rituximab.  Will consider BTK inhibitors if patient is intolerant to bendamustine/rituximab in the second-line      Plan:  Reviewed labs, proceed with cycle 1 day 8 of treatment today   Continue dose reduced Revlimid to 10 mg days 1-21 every 28 days due to thrombocytopenia and mild leukopenia.  Plan to follow-up in clinic prior to cycle 1 day 22 rituximab to assess treatment tolerance  Labs including LDH and uric acid prior to every treatment  Pending hepatitis-B total core antibody    Portions of the record may have been created with voice recognition software.  Occasional wrong-word or sound-a-like substitutions may have occurred due to the inherent limitations of voice recognition software.

## 2024-06-13 ENCOUNTER — TELEPHONE (OUTPATIENT)
Dept: HEMATOLOGY/ONCOLOGY | Facility: CLINIC | Age: 84
End: 2024-06-13
Payer: MEDICARE

## 2024-06-13 NOTE — TELEPHONE ENCOUNTER
Mara with [facility] reports Pt is refusing home health services. Spoke with Pt states I don't need home health I feel great I mowed my grass and weedeated today and I can go to lab to have my labs drawn. Mara with [facility] notified to cancel home health. Dr. Brock notified.--SC, LPN

## 2024-06-17 RX ORDER — FAMOTIDINE 20 MG/1
20 TABLET, FILM COATED ORAL
Status: CANCELLED | OUTPATIENT
Start: 2024-06-17

## 2024-06-17 RX ORDER — ACETAMINOPHEN 325 MG/1
650 TABLET ORAL
Status: CANCELLED | OUTPATIENT
Start: 2024-06-17

## 2024-06-17 RX ORDER — MEPERIDINE HYDROCHLORIDE 50 MG/ML
25 INJECTION INTRAMUSCULAR; INTRAVENOUS; SUBCUTANEOUS
Status: CANCELLED | OUTPATIENT
Start: 2024-06-17

## 2024-06-17 RX ORDER — HEPARIN 100 UNIT/ML
500 SYRINGE INTRAVENOUS
Status: CANCELLED | OUTPATIENT
Start: 2024-06-17

## 2024-06-17 RX ORDER — DIPHENHYDRAMINE HCL 25 MG
50 CAPSULE ORAL
Status: CANCELLED | OUTPATIENT
Start: 2024-06-17

## 2024-06-17 RX ORDER — SODIUM CHLORIDE 0.9 % (FLUSH) 0.9 %
10 SYRINGE (ML) INJECTION
Status: CANCELLED | OUTPATIENT
Start: 2024-06-17

## 2024-06-24 ENCOUNTER — OFFICE VISIT (OUTPATIENT)
Dept: HEMATOLOGY/ONCOLOGY | Facility: CLINIC | Age: 84
End: 2024-06-24
Payer: MEDICARE

## 2024-06-24 VITALS
OXYGEN SATURATION: 97 % | BODY MASS INDEX: 29.19 KG/M2 | WEIGHT: 171 LBS | HEIGHT: 64 IN | DIASTOLIC BLOOD PRESSURE: 70 MMHG | SYSTOLIC BLOOD PRESSURE: 110 MMHG | HEART RATE: 69 BPM | RESPIRATION RATE: 14 BRPM | TEMPERATURE: 98 F

## 2024-06-24 DIAGNOSIS — C83.13 MANTLE CELL LYMPHOMA OF INTRA-ABDOMINAL LYMPH NODES: Primary | ICD-10-CM

## 2024-06-24 DIAGNOSIS — D84.821 IMMUNODEFICIENCY DUE TO CHEMOTHERAPY: ICD-10-CM

## 2024-06-24 DIAGNOSIS — Z51.11 ENCOUNTER FOR ANTINEOPLASTIC CHEMOTHERAPY: ICD-10-CM

## 2024-06-24 DIAGNOSIS — T45.1X5A IMMUNODEFICIENCY DUE TO CHEMOTHERAPY: ICD-10-CM

## 2024-06-24 DIAGNOSIS — C83.13 MANTLE CELL LYMPHOMA OF INTRA-ABDOMINAL LYMPH NODES: ICD-10-CM

## 2024-06-24 DIAGNOSIS — Z79.899 IMMUNODEFICIENCY DUE TO CHEMOTHERAPY: ICD-10-CM

## 2024-06-24 DIAGNOSIS — D69.6 THROMBOCYTOPENIA: ICD-10-CM

## 2024-06-24 PROCEDURE — 99215 OFFICE O/P EST HI 40 MIN: CPT | Mod: ,,, | Performed by: STUDENT IN AN ORGANIZED HEALTH CARE EDUCATION/TRAINING PROGRAM

## 2024-06-24 RX ORDER — SODIUM CHLORIDE 0.9 % (FLUSH) 0.9 %
10 SYRINGE (ML) INJECTION
Status: CANCELLED | OUTPATIENT
Start: 2024-06-24

## 2024-06-24 RX ORDER — FAMOTIDINE 20 MG/1
20 TABLET, FILM COATED ORAL
Status: CANCELLED | OUTPATIENT
Start: 2024-06-24

## 2024-06-24 RX ORDER — LENALIDOMIDE 10 MG/1
10 CAPSULE ORAL SEE ADMIN INSTRUCTIONS
Qty: 21 EACH | Refills: 0 | Status: SHIPPED | OUTPATIENT
Start: 2024-06-24

## 2024-06-24 RX ORDER — ACETAMINOPHEN 325 MG/1
650 TABLET ORAL
Status: CANCELLED | OUTPATIENT
Start: 2024-06-24

## 2024-06-24 RX ORDER — MEPERIDINE HYDROCHLORIDE 50 MG/ML
25 INJECTION INTRAMUSCULAR; INTRAVENOUS; SUBCUTANEOUS
Status: CANCELLED | OUTPATIENT
Start: 2024-06-24

## 2024-06-24 RX ORDER — HEPARIN 100 UNIT/ML
500 SYRINGE INTRAVENOUS
Status: CANCELLED | OUTPATIENT
Start: 2024-06-24

## 2024-06-24 RX ORDER — DIPHENHYDRAMINE HCL 25 MG
50 CAPSULE ORAL
Status: CANCELLED | OUTPATIENT
Start: 2024-06-24

## 2024-06-24 NOTE — PROGRESS NOTES
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     Treatment history    05/18/2022-10/26/2022:  6 cycles of bendamustine rituximab  December 2022:  Ibrutinib 560 mg q.day  03/16/2023-07/18/2023:  Ibrutinib 420 mg q.d.  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 He also had a single visit with Dr Snow in 2018 but never followed back.   Patient had a PET-CT as well as a bone marrow biopsy and repeat peripheral flow cytometry with a diagnosis of CLL/SLL and mantle cell lymphoma.  Patient had symptoms of fatigue and weight loss prior to his workup and diagnosis.  He was diagnosed with mantle cell lymphoma and CLL and started on treatment with bendamustine rituximab, with incomplete response following which he was started on ibrutinib.      Interval history     Today, 06/24/2024, patient denies any acute concerns.  He denies any fevers, chills but does report some fatigue.  He denies any new lumps or bumps.  He reports compliance with Revlimid at home.  Patient denies any new medications since his last visit with us.      Labs:    06/23/2024:  Creatinine 0.9, albumin 3.8, calcium 10.0, LFTs unremarkable, uric acid 6.1, , WBC count 2.3, hemoglobin 13.5, MCV 99.3, platelet count 108, ANC 1.41 hepatitis-B total core antibody negative    06/09/2024 creatinine 0.8, albumin 3.6, LFTs unremarkable, LDH 02/08/2028, uric acid 6.9, hepatitis-B surface antigen  nonreactive, WBC count 2.97, hemoglobin 13.5, platelet count 99800, ANC 2.31.    05/09/2024 WBC count 3.47, hemoglobin 13.8, platelet count 85048, ANC 2.37  04/11/2024 creatinine 0.96, albumin 3.3, LFTs unremarkable, , TSH 2.7, folic acid 11.4, vitamin B12 479, WBC count 2.7, hemoglobin 13.3, MCV 99, platelet count [id number]  03/14/2024 CMP unremarkable, , creatinine 0.86, WBC count 3.5, hemoglobin 13.7, MCV 99.8, platelet count 43718, ANC 2.18.  02/15/2024:  Creatinine 1.08, albumin 3.5, calcium 9.6, alkaline phosphatase 95, total bilirubin 0.5, AST 40, ALT 49, , WBC count 3.47, hemoglobin 14.2, .7, platelet count 66103, ANC 2.18.  12/18/2023 creatinine 0.85, albumin 3.4, calcium 9.4, LFTs unremarkable, WBC count 3.8, hemoglobin 14.2, .5, platelet count 130, ANC 2.94.  10/09/2023 creatinine 0.8, LFTs unremarkable, uric acid 6.2, , WBC count 3.1, hemoglobin 13.8, .7, platelet count 01454, ANC 2.16.  08/14/2023 CMP unremarkable, , uric acid 6.8, WBC count 4.23, hemoglobin 13.6, , platelet count 406546, ANC 2.87.  06/14/23:  wbc 3.26, hgb 13.9, plt 102, ANC 2.21 Cr 1.14 total bili 0.4 AST 26 ALT 23   05/31/23: wbc 3.69, hgb 12.9, plt 4,000, ANC 2.58  05/26/23: cr 1.11, alb 3.6, ca 9.3, LFTs WNL, , uric acid 6.7, mag 2.1, phosphorus 2.7  05/03/2023:  Creatinine 0.86, albumin 3.6, calcium 9.3, alkaline phosphatase 61, total bili 0.5, AST 19, ALT 16, magnesium 2.2, phosphorus 3.2, , uric acid 6.5, WBC count 2.6, hemoglobin 12.7, .3, platelet count 124, ANC 1.69.  04/12/2023:  Creatinine 0.83, albumin 3.5, calcium 9.2, , uric acid 3.8, LFTs within normal limits, magnesium 2.0, phosphorus 2.6, WBC count 2.7, hemoglobin 12.6, ANC 2000, ALC 0.28.  03/28/23: CMP unremarkable except ca 10.2, mag 2.2, phosphorus 3.0, , uric acid 4.2, wbc 3.43, hgb 13.3, plt 78, ANC 2.48  03/10/23: CMP unremarkable, wbc 2.57, hgb 12.8, plt 87, ANC  1.85  03/01/23: cr 0.96, ca 9.2, alb 3.4, AST 35, ALT 24, , mag 1.9, phosphorus 3.4, uric acid 3.8, wbc 3.23, hgb 12.5, plt 74,000, ANC 2.39  02/01/23: Cr 0.92, ca 9.6, alb 3.6, LFTs WNL, wbc 3.08, hgb 12.6, plt 102, ANC 2.15, ALC 0.25  01/20/23; cr 0.97, alb 3.5, LFTs WNL, wbc 3.06, hgb 12.8, plt 64, ANC 2.25  11/22/22: cr 0.86, alb 3.7, LFTs WNL, , uric acid 5.1, wbc 2.16, hgb 12.8, plt 76, mcv 107.8, ALC 0.35, ANC 1.11  10/25/2022:  Creatinine 0.89, albumin 3.5, LFTs within normal limits, , uric acid 3.6.  WBC count 3.15, hemoglobin 12.7, .3, platelet count 125 1000, ANC 2.28.  10/11/2022:  Creatinine 1.04, albumin 3.6, LFTs within normal limits, magnesium 2.0, phosphorus 2.7, , uric acid 3.2, WBC 2.4, hemoglobin 13.4, .5, platelet count 45534.  ANC 1.77.  09/13/2022:  Creatinine 0.80, albumin 3.7, LFTs within normal limits, , uric acid 4.0, WBC count 2.37, hemoglobin 13.2, , platelet count 14838, ANC 1.45.  08/16/2022:  Creatinine 0.81, albumin 3.8, calcium 9.2, total bili 0.5, LFTs within normal limits, uric acid 3.3, , WBC 3.45, hemoglobin 13.7, .8, platelet count 07117, ANC 2.06.  07/19/2022:  WBC 3.75, hemoglobin 13.1, .5, platelet count 97450, ANC 2.32, uric acid 4.1, , creatinine 1.09, albumin 3.5, calcium 9.6, LFTs within normal limits.  06/21/2022 WBC at 2.58 bed neutrophil adequate at 55%.  Hemoglobin 12.7, hematocrit 39.4.  06/14/2022:  Creatinine 0.99, calcium 9.1, , uric acid 3.7, phosphorus 3.3, potassium 4.5, WBC count 2.58, hemoglobin 12.7, platelet count 19853, ANC 1.44.  05/27/2022:  Potassium 4.9, sodium 140, creatinine 0.87, , uric acid 6.6.  05/17/2022:  Creatinine 1.03, albumin 3.3, ,  WBC count 10.32, hemoglobin 12.4, platelet count 135 1000.   05/02/2022:  Creatinine 0.93, albumin 3.6, total protein 6.4, , potassium 5.2, WBC count 9.6, hemoglobin 11.4, .4, platelet count  92.   22 Cr 0.89 Alb 3.4 TP 6.4 Ca 9.4  TSH 2.0 Iron 58n TIBC 285 Ferritin 48 Folate 9.9 B12 426 Hep B s ag neg Uric acid 7.8 Hep C ab neg WBC 9.79 RBC 3.77 Hg 11.8 .5 RDW 13.4 PLT 91 ANC 2.79 ALC 6.36 Direct Ramos neg b2 micro 7.5 Copper 135 Hapto 225 Hep B core ab neg SPEP w/ ARABELLA normal Abs kappa 32 Abs lambda 124 SFLC ratio 0.26  3/18/22 Cr 1 AST 27 ALT 12 AlkPhos 87 Alb 4.1 TP 6.5 Ca 9.8 WBC 9.4 RBC 3.92 Hg 12.2 MCV 96 RDW 15.1  ALC 5.8 ANC 3  5/15/18 WBC 14.71 RBC 4.42 Hg 14.3 MCV 98.6 RDW 13  ANC 2.91 ALC 10.13 AMC 1.29    Imagin2024 PET-CT:  Interval progression of lymphoma with increased size and number on metabolically activity of multiple hypermetabolic lymph nodes above and below the diaphragm.  Interval increased size of spleen with diffuse moderate intense uptake also concerning for lymphomatous involvement.  No other suspicious uptake    2023 PET-CT:  Interval development of mild-to-moderate uptake in multiple small lymph nodes in the chest and abdomen which is somewhat concerning given the history of lymphoma, continued close monitoring is recommended.  Interval development of nonspecific mild diffuse splenic uptake clinical recommendation is recommended.  Interval development of subtle bilateral ground-glass opacities with mild uptake, which is being favored being inflammatory in etiology, attention on follow-up    2023 PET-CT:  Stable PET-CT without evidence of metabolically active residual or recurrent lymphoma.    2023:  PET-CT-interval resolution of moderate uptake in right axillary lymph node with no current PET evidence of metabolically active residual or recurrent lymphoma.    22 PET CT: Stable right axillary lymph node with mild-to-moderate uptake, representing residual metabolically active tumor. No new suspicious uptake noted     2022 PET-CT:  Excellent response to therapy since her last exam with interval resolution  or near complete resolution of multiple hypermetabolic lymph nodes above and below the diaphragm.  There is residual mild-to-moderate uptake in the right axillary lymph node, Deauville score 4  Interval resolution of diffuse uptake in spleen which is also decreased in size.  No new suspicious uptake.     05/10/22:-  right lower extremity Doppler negative for DVT       4/13/22 PET CT: multiple hypermetabolic LNs b/l neck. Right submandibular LN .7x1.7cm, SUV 7.8. Left submandibular LN 2.3x1.7cm SUV 7.2. Multiple hypermetabolic LNs in chest. Right axillary node 3.3x2cm SUV 8.1. Hypermetabolic left axillary LN SUV 6.4, 1.9x3.2cm. Hypermetabolic right paratracheal LN 2.4x1.6cm SUV 4.9. Right pulmonary hilum SUV 2.7. Subcarinal LN SUV 3.3, 1.2x3.3cm. Spleen enlarged at 15cm and demonstrates heterogeneous mild to moderate uptake SUV 5.2. Moderately intense activity in stomach and proximal duodenum with no degree of focality or discrete mass on CT. Multiple hypermetabolic LNs in abd and pelvis. Left periaortic LN SUV 6.8, 4x3.5cm. Large hypermetabolic mesenteric mass [id number] SUV 8.5. Aortocaval LN 3.5x3.8cm SUV 5.1. Hypermetabolic mass in left pelvis 5.5x7cm SUV 7. Left external iliac LN SUV 6 2.2x2.1cm. Extensive hypermetabolism anteriorly and laterally in left abdomen, associated with soft tissue thickening that is difficult to separate from bowel loops. B/l inguinal LNs up to 2.9x3.9cm, SUV 6.6    3/29/22 CT neck w/ and w/o contrast: widespread adenopathy identified in parotic glands bilateral, submandibular space, anterior and posterior cervical chains, superior mediastinum and supraclavicular and infraclavicular spaces of the base of neck/upper chest in appearance strongly suspicious for lymphoma. Multiple subcutaneous masses are also identified in lower occipital scalp and upper neck similar to lymphadenopathy. Biopsy of prominent subcutaneous mass in right upper neck is recommended and should present the easiest  location for tissue sampling    Path:  5/23/18 peripheral blood flow cytometry: CD5+ B cell population. No abnormal T cell or myeloid cell populations. CD19+, CD20+, CD5+, CD23+, CD11c+ surface kappa light chain+, CD38-, CD22+ (dim/mod), CD43+, FMC7-. Consistent with CLL     04/26/2022:  Bone marrow biopsy-marrow -trilineage hematopoiesis with 10-20% B-cell infiltrate, C5 positive B-cell population with immunophenotype typical for CLL.  CLL fish within normal limits, heavy chain mutation status cannot be determined.  6q, chromosome 12, chromosome 13, chromosome 11, chromosome 17 and t( 11;14) abnormalities not detected.     05/06/2022 left axillary lymph node core biopsy:  Mantle cell lymphoma with a subset consistent with CLL/SLL., CD5 positive monoclonal B-cell population is identified, approximately 91% of total cells, immuno phenotype is nonspecific and may be seen in CLL, marginal zone lymphoma, or large cell lymphoma.  A 2nd small monoclonal B-cell population approximately 2% of the cells identified with an immunophenotype consistent with CLL.  New genomic consultation lymphoma cells positive for CD 20, CD 5, cyclin D1 and BCL2 with a TI 67 of 10%.  There is no significant staining for CD10, BCL6.  However workup consistent with diagnosis of mantle cell lymphoma.        Past Medical History:   Diagnosis Date    Cataract     Lymphoma        Past Surgical History:   Procedure Laterality Date    HERNIA REPAIR         Family History   Problem Relation Name Age of Onset    Kidney disease Mother      Colon cancer Father      Colon cancer Sister      Breast cancer Sister      Uterine cancer Sister         Social History     Socioeconomic History    Marital status:    Tobacco Use    Smoking status: Never    Smokeless tobacco: Never   Substance and Sexual Activity    Alcohol use: Not Currently    Drug use: Never       Current Outpatient Medications   Medication Sig Dispense Refill    allopurinoL (ZYLOPRIM)  300 MG tablet Take 1 tablet (300 mg total) by mouth once daily. 30 tablet 5    aspirin (ECOTRIN) 81 MG EC tablet Take 81 mg by mouth once daily.      carvediloL (COREG) 12.5 MG tablet Take 1 tablet by mouth 2 (two) times a day.      EScitalopram oxalate (LEXAPRO) 10 MG tablet Take 1 tablet by mouth once daily.      ezetimibe (ZETIA) 10 mg tablet Take 10 mg by mouth once daily.      lenalidomide (REVLIMID) 10 mg Cap Take 10 mg by mouth As instructed TAKE 1 CAPSULE DAYS 1-21 EVERY 28 DAYS.. 21 each 0    LIDOcaine-prilocaine (EMLA) cream Apply topically as needed. Apply Cream to Mediport 60 minutes prior to infusion. 30 g 5    ondansetron (ZOFRAN) 8 MG tablet Take 1 tablet (8 mg total) by mouth every 8 (eight) hours as needed for Nausea. 30 tablet 1    ondansetron (ZOFRAN-ODT) 4 MG TbDL Take 1 tablet (4 mg total) by mouth every 8 (eight) hours as needed. 30 tablet 2    pantoprazole (PROTONIX) 40 MG tablet Take 1 tablet (40 mg total) by mouth once daily. 30 tablet 1    pregabalin (LYRICA) 50 MG capsule pregabalin 50 mg capsule   TAKE 1 CAPSULE BY MOUTH TWICE DAILY      rosuvastatin (CRESTOR) 20 MG tablet Take 20 mg by mouth once daily.      traMADoL (ULTRAM) 50 mg tablet Take 1 tablet (50 mg total) by mouth every 6 (six) hours as needed for Pain. 30 tablet 0    valACYclovir (VALTREX) 500 MG tablet Take 1 tablet (500 mg total) by mouth 2 (two) times daily. 60 tablet 3     No current facility-administered medications for this visit.       Review of patient's allergies indicates:   Allergen Reactions    Pregabalin           ROS  CONSTITUTIONAL: no fevers, no chills,  No weight loss, no  fatigue, no weakness  HEMATOLOGIC: no abnormal bleeding, no abnormal bruising, no drenching night sweats  ONCOLOGIC: no new masses or lumps  HEENT: no vision loss, no tinnitus or hearing loss, no nose bleeding, no dysphagia, no odynophagia  CVS: no chest pain, no palpitations, no dyspnea on exertion  RESP: no shortness of breath, no  hemoptysis, no cough  GI: no nausea, no vomiting, no diarrhea, no constipation, no melena, no hematochezia, no hematemesis, no abdominal pain, no increase in abdominal girth  : no dysuria, no hematuria, no hesitancy, no scrotal swelling, no discharge  INTEGUMENT: no rashes, no abnormal bruising, no nail pitting, no hyperpigmentation  NEURO: no falls, no memory loss, no paresthesias or dysesthesias, no urofecal incontinence or retention, no loss of strength on any extremity  MSK: no back pain, no new joint pain, no joint swelling  PSYCH: no suicidal or homicidal ideation, no depression, no insomnia, no anhedonia  ENDOCRINE: no heat or cold intolerance, no polyuria, no polydipsia          Physical exam     Vitals:    06/24/24 0933   BP: 110/70   Pulse: 69   Resp: 14   Temp: 97.8 °F (36.6 °C)       GEN: AAOx3, NAD  HEENT: PERRLA, EOMI, edentulous, no oral ulcers  NECK:  Supple, full range of motion  LYMPH:  No axillary, supra clavicle or cervical adenopathy appreciated on today's exam.  CVS: s1s2 RRR, no M/R/G, port in place in right chest wall without surrounding edema or erythema.  RESP: CTA b/l, no crackles, no wheezes or rhonchi, well-healed herpes zoster rash.  ABD: soft, NT, ND, BS+, no hepatomegaly, mild splenomegaly,   EXT: no deformities, right lower extremity edema.  SKIN:  No rash,.  No evidence of petechia.  NEURO: normal mentation, strength 5/5 on all 4 extremities, no sensory deficits       ASSESSMENT:     # Mantle cell lymphoma, [place]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  Positive for CLL.  PET-CT with evidence of bulky lymphadenopathy, largest measuring 14 cm, spleen measuring 15 cm.  Discussed with patient and his family the diagnosis, natural history and treatment options.  Discussed the risk for TLS given bulky lymphadenopathy, perforation given lymphadenopathy around the bowel loops as well as  declining ECOG given his advanced age.  Continuet PJP prophylaxis with Bactrim Monday Wednesday Friday and valacyclovir with treatment.  Continue allopurinol 300 mg q.day for TLS prophylaxis.  Patient is status post cycle 1 of bendamustine and rituximab on 05/18/2022.  Patient has bendamustine was reduced by 25% and rituximab was given 1 week apart to reduce the risk of perforation.  S/p cycle 2 and cycle 3 without significant side effects, dosed for bendamustine increased to 100%.  Discussed with patient interim PET-CT with excellent response to treatment however with residual hypermetabolism in right axillary lymph node with a Deauville score of 4.  Given his excellent tolerance of treatment, will proceed to complete 6 cycles of BR in the setting of above PET-CT report.  Discussed alternate treatment plan to proceed with ibrutinib given partial response.  Patient elected to proceed with another 2 cycles of BR with plans to repeat PET-CT after completion of treatment to assess treatment response.  Given incomplete response on PET-CT after completion of 6 hours OH, he was started on ibrutinib in second-line initially at 560 mg q.day.    Given patient's thrombocytopenia, dose was reduced to 420 mg in March 2023  PET-CT in April 2023 with excellent response with resolution of axillary adenopathy  PET-CT in August 2023 with no evidence of disease  PET-CT in December 2023 with findings concerning for disease recurrence  Patient was started back on ibrutinib 140 mg q.day in the last week of January 2024.    Patient had multiple interruptions on ibrutinib due to persistent/worsening thrombocytopenia  PET-CT in May 2024 with findings consistent with progression of disease  Discussed with patient and family the plan to initiate 3rd line treatment with Revlimid/rituximab.  Discussed the palliative nature of treatment and incurable nature of disease.  Patient was like to proceed with therapy.  Patient was initiated rituximab  Revlimid on 06/03/2024.  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      Based on available information, likely SLL/CLL with some constitutional symptoms including weight loss, fatigue and night sweats. Adenopathy easily palpable above and below the diaphragm. Mild thrombocytopenia w/ PLT count ~120k and very mild anemia with Hg ~12 g/dl. No hypercalcemia, no evidence of immune paresis  Biopsies 4/11/22 with Dr Brannon were FNAs and non diagnostic for CLL. I do not know what IHC was obtained or whether pathologist knew what the potential diagnosis was as report only says negative for carcinoma. Will discuss with Dr Lan to add CD5, CD10, CD19, CD20 etc if enough tissue  Regardless, will also refer for axillary LN biopsy, either excisional or at the very least core. Referral sent today  PET CT 4/2022 findings are noted. Given his advanced age he was not refer for EGD. Masses in abdomen are likely matted gurinder conglomerates and I do not think it would be worth it to attempt a biopsy of those  CLL FISH  Negative , IGHV mutation status  Hep B and C negative  Allopurinol 300mg daily with PO hydration prior to initiation of any therapy  Given concomitant diagnosis of CLL and mantle cell lymphoma will plan to initiate treatment for mantle cell lymphoma and CLL with bendamustine rituximab.  Will consider BTK inhibitors if patient is intolerant to bendamustine/rituximab in the second-line      Plan:  Reviewed labs, proceed with cycle 1 day 28 of treatment today   Continue dose reduced Revlimid to 10 mg days 1-21 every 28 days due to thrombocytopenia and mild leukopenia.  Plan for cycle 2 day 1 rituximab next week, patient will restart Revlimid dose reduced as previously on 07/01/2024 along with cycle 2 day 1.    Plan to follow-up in 5 weeks prior to cycle 3 day 1 to assess treatment tolerance  Labs including LDH and uric acid prior to every  treatment    Portions of the record may have been created with voice recognition software. Occasional wrong-word or sound-a-like substitutions may have occurred due to the inherent limitations of voice recognition software.

## 2024-07-19 DIAGNOSIS — C83.13 MANTLE CELL LYMPHOMA OF INTRA-ABDOMINAL LYMPH NODES: ICD-10-CM

## 2024-07-19 RX ORDER — LENALIDOMIDE 10 MG/1
10 CAPSULE ORAL SEE ADMIN INSTRUCTIONS
Qty: 21 EACH | Refills: 0 | Status: SHIPPED | OUTPATIENT
Start: 2024-07-19

## 2024-07-29 ENCOUNTER — OFFICE VISIT (OUTPATIENT)
Dept: HEMATOLOGY/ONCOLOGY | Facility: CLINIC | Age: 84
End: 2024-07-29
Payer: MEDICARE

## 2024-07-29 VITALS
RESPIRATION RATE: 14 BRPM | OXYGEN SATURATION: 97 % | SYSTOLIC BLOOD PRESSURE: 112 MMHG | BODY MASS INDEX: 28.85 KG/M2 | WEIGHT: 169 LBS | TEMPERATURE: 98 F | HEART RATE: 65 BPM | DIASTOLIC BLOOD PRESSURE: 77 MMHG | HEIGHT: 64 IN

## 2024-07-29 DIAGNOSIS — Z51.11 ENCOUNTER FOR ANTINEOPLASTIC CHEMOTHERAPY: ICD-10-CM

## 2024-07-29 DIAGNOSIS — D61.818 OTHER PANCYTOPENIA: ICD-10-CM

## 2024-07-29 DIAGNOSIS — C83.13 MANTLE CELL LYMPHOMA OF INTRA-ABDOMINAL LYMPH NODES: Primary | ICD-10-CM

## 2024-07-29 PROCEDURE — 99215 OFFICE O/P EST HI 40 MIN: CPT | Mod: ,,, | Performed by: STUDENT IN AN ORGANIZED HEALTH CARE EDUCATION/TRAINING PROGRAM

## 2024-07-29 RX ORDER — SODIUM CHLORIDE 0.9 % (FLUSH) 0.9 %
10 SYRINGE (ML) INJECTION
Status: CANCELLED | OUTPATIENT
Start: 2024-07-29

## 2024-07-29 RX ORDER — MEPERIDINE HYDROCHLORIDE 50 MG/ML
25 INJECTION INTRAMUSCULAR; INTRAVENOUS; SUBCUTANEOUS
Status: CANCELLED | OUTPATIENT
Start: 2024-07-29

## 2024-07-29 RX ORDER — ACETAMINOPHEN 325 MG/1
650 TABLET ORAL
Status: CANCELLED | OUTPATIENT
Start: 2024-07-29

## 2024-07-29 RX ORDER — DIPHENHYDRAMINE HCL 25 MG
50 CAPSULE ORAL
Status: CANCELLED | OUTPATIENT
Start: 2024-07-29

## 2024-07-29 RX ORDER — FAMOTIDINE 20 MG/1
20 TABLET, FILM COATED ORAL
Status: CANCELLED | OUTPATIENT
Start: 2024-07-29

## 2024-07-29 RX ORDER — HEPARIN 100 UNIT/ML
500 SYRINGE INTRAVENOUS
Status: CANCELLED | OUTPATIENT
Start: 2024-07-29

## 2024-07-29 NOTE — PROGRESS NOTES
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     Treatment history    05/18/2022-10/26/2022:  6 cycles of bendamustine rituximab  December 2022:  Ibrutinib 560 mg q.day  03/16/2023-07/18/2023:  Ibrutinib 420 mg q.d.  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 He also had a single visit with Dr Snow in 2018 but never followed back.   Patient had a PET-CT as well as a bone marrow biopsy and repeat peripheral flow cytometry with a diagnosis of CLL/SLL and mantle cell lymphoma.  Patient had symptoms of fatigue and weight loss prior to his workup and diagnosis.  He was diagnosed with mantle cell lymphoma and CLL and started on treatment with bendamustine rituximab, with incomplete response following which he was started on ibrutinib.      Interval history     Today, 07/29/2024, patient denies any acute concerns.  He denies any fevers, chills but does report some fatigue.  He denies any new lumps or bumps.  He reports compliance with Revlimid at home.  Patient denies any new medications since his last visit with us.  He reports decreased appetite and has lost 2 lb since his last follow-up with us.  He reports tolerating his last cycle of treatment relatively well without any significant side effects      Labs:    07/28/2024:  CMP unremarkable, WBC count 2.76, hemoglobin 13.2, MCV 98.5, MCV 98.5, platelet count 151, ANC 1.6.    06/23/2024:  Creatinine 0.9, albumin  3.8, calcium 10.0, LFTs unremarkable, uric acid 6.1, , WBC count 2.3, hemoglobin 13.5, MCV 99.3, platelet count 108, ANC 1.41 hepatitis-B total core antibody negative    06/09/2024 creatinine 0.8, albumin 3.6, LFTs unremarkable, LDH 02/08/2028, uric acid 6.9, hepatitis-B surface antigen nonreactive, WBC count 2.97, hemoglobin 13.5, platelet count 89012, ANC 2.31.    05/09/2024 WBC count 3.47, hemoglobin 13.8, platelet count 23127, ANC 2.37  04/11/2024 creatinine 0.96, albumin 3.3, LFTs unremarkable, , TSH 2.7, folic acid 11.4, vitamin B12 479, WBC count 2.7, hemoglobin 13.3, MCV 99, platelet count 83963  03/14/2024 CMP unremarkable, , creatinine 0.86, WBC count 3.5, hemoglobin 13.7, MCV 99.8, platelet count 43729, ANC 2.18.  02/15/2024:  Creatinine 1.08, albumin 3.5, calcium 9.6, alkaline phosphatase 95, total bilirubin 0.5, AST 40, ALT 49, , WBC count 3.47, hemoglobin 14.2, .7, platelet count 01117, ANC 2.18.  12/18/2023 creatinine 0.85, albumin 3.4, calcium 9.4, LFTs unremarkable, WBC count 3.8, hemoglobin 14.2, .5, platelet count 130, ANC 2.94.  10/09/2023 creatinine 0.8, LFTs unremarkable, uric acid 6.2, , WBC count 3.1, hemoglobin 13.8, .7, platelet count 46482, ANC 2.16.  08/14/2023 CMP unremarkable, , uric acid 6.8, WBC count 4.23, hemoglobin 13.6, , platelet count 418996, ANC 2.87.  06/14/23:  wbc 3.26, hgb 13.9, plt 102, ANC 2.21 Cr 1.14 total bili 0.4 AST 26 ALT 23   05/31/23: wbc 3.69, hgb 12.9, plt 4,000, ANC 2.58  05/26/23: cr 1.11, alb 3.6, ca 9.3, LFTs WNL, , uric acid 6.7, mag 2.1, phosphorus 2.7  05/03/2023:  Creatinine 0.86, albumin 3.6, calcium 9.3, alkaline phosphatase 61, total bili 0.5, AST 19, ALT 16, magnesium 2.2, phosphorus 3.2, , uric acid 6.5, WBC count 2.6, hemoglobin 12.7, .3, platelet count 124, ANC 1.69.  04/12/2023:  Creatinine 0.83, albumin 3.5, calcium 9.2, , uric acid 3.8, LFTs within  normal limits, magnesium 2.0, phosphorus 2.6, WBC count 2.7, hemoglobin 12.6, ANC 2000, ALC 0.28.  03/28/23: CMP unremarkable except ca 10.2, mag 2.2, phosphorus 3.0, , uric acid 4.2, wbc 3.43, hgb 13.3, plt 78, ANC 2.48  03/10/23: CMP unremarkable, wbc 2.57, hgb 12.8, plt 87, ANC 1.85  03/01/23: cr 0.96, ca 9.2, alb 3.4, AST 35, ALT 24, , mag 1.9, phosphorus 3.4, uric acid 3.8, wbc 3.23, hgb 12.5, plt 74,000, ANC 2.39  02/01/23: Cr 0.92, ca 9.6, alb 3.6, LFTs WNL, wbc 3.08, hgb 12.6, plt 102, ANC 2.15, ALC 0.25  01/20/23; cr 0.97, alb 3.5, LFTs WNL, wbc 3.06, hgb 12.8, plt 64, ANC 2.25  11/22/22: cr 0.86, alb 3.7, LFTs WNL, , uric acid 5.1, wbc 2.16, hgb 12.8, plt 76, mcv 107.8, ALC 0.35, ANC 1.11  10/25/2022:  Creatinine 0.89, albumin 3.5, LFTs within normal limits, , uric acid 3.6.  WBC count 3.15, hemoglobin 12.7, .3, platelet count 125 1000, ANC 2.28.  10/11/2022:  Creatinine 1.04, albumin 3.6, LFTs within normal limits, magnesium 2.0, phosphorus 2.7, , uric acid 3.2, WBC 2.4, hemoglobin 13.4, .5, platelet count 56248.  ANC 1.77.  09/13/2022:  Creatinine 0.80, albumin 3.7, LFTs within normal limits, , uric acid 4.0, WBC count 2.37, hemoglobin 13.2, , platelet count 78080, ANC 1.45.  08/16/2022:  Creatinine 0.81, albumin 3.8, calcium 9.2, total bili 0.5, LFTs within normal limits, uric acid 3.3, , WBC 3.45, hemoglobin 13.7, .8, platelet count 21163, ANC 2.06.  07/19/2022:  WBC 3.75, hemoglobin 13.1, .5, platelet count 29885, ANC 2.32, uric acid 4.1, , creatinine 1.09, albumin 3.5, calcium 9.6, LFTs within normal limits.  06/21/2022 WBC at 2.58 bed neutrophil adequate at 55%.  Hemoglobin 12.7, hematocrit 39.4.  06/14/2022:  Creatinine 0.99, calcium 9.1, , uric acid 3.7, phosphorus 3.3, potassium 4.5, WBC count 2.58, hemoglobin 12.7, platelet count 38174, ANC 1.44.  05/27/2022:  Potassium 4.9, sodium 140, creatinine  0.87, , uric acid 6.6.  2022:  Creatinine 1.03, albumin 3.3, ,  WBC count 10.32, hemoglobin 12.4, platelet count 135 1000.   2022:  Creatinine 0.93, albumin 3.6, total protein 6.4, , potassium 5.2, WBC count 9.6, hemoglobin 11.4, .4, platelet count 92.   4/ Cr 0.89 Alb 3.4 TP 6.4 Ca 9.4  TSH 2.0 Iron 58n TIBC 285 Ferritin 48 Folate 9.9 B12 426 Hep B s ag neg Uric acid 7.8 Hep C ab neg WBC 9.79 RBC 3.77 Hg 11.8 .5 RDW 13.4 PLT 91 ANC 2.79 ALC 6.36 Direct Ramos neg b2 micro 7.5 Copper 135 Hapto 225 Hep B core ab neg SPEP w/ ARABELLA normal Abs kappa 32 Abs lambda 124 SFLC ratio 0.26  3/18/22 Cr 1 AST 27 ALT 12 AlkPhos 87 Alb 4.1 TP 6.5 Ca 9.8 WBC 9.4 RBC 3.92 Hg 12.2 MCV 96 RDW 15.1  ALC 5.8 ANC 3  5/15/18 WBC 14.71 RBC 4.42 Hg 14.3 MCV 98.6 RDW 13  ANC 2.91 ALC 10.13 AMC 1.29    Imagin2024 PET-CT:  Interval progression of lymphoma with increased size and number on metabolically activity of multiple hypermetabolic lymph nodes above and below the diaphragm.  Interval increased size of spleen with diffuse moderate intense uptake also concerning for lymphomatous involvement.  No other suspicious uptake    2023 PET-CT:  Interval development of mild-to-moderate uptake in multiple small lymph nodes in the chest and abdomen which is somewhat concerning given the history of lymphoma, continued close monitoring is recommended.  Interval development of nonspecific mild diffuse splenic uptake clinical recommendation is recommended.  Interval development of subtle bilateral ground-glass opacities with mild uptake, which is being favored being inflammatory in etiology, attention on follow-up    2023 PET-CT:  Stable PET-CT without evidence of metabolically active residual or recurrent lymphoma.    2023:  PET-CT-interval resolution of moderate uptake in right axillary lymph node with no current PET evidence of metabolically active  residual or recurrent lymphoma.    11/7/22 PET CT: Stable right axillary lymph node with mild-to-moderate uptake, representing residual metabolically active tumor. No new suspicious uptake noted     08/25/2022 PET-CT:  Excellent response to therapy since her last exam with interval resolution or near complete resolution of multiple hypermetabolic lymph nodes above and below the diaphragm.  There is residual mild-to-moderate uptake in the right axillary lymph node, Deauville score 4  Interval resolution of diffuse uptake in spleen which is also decreased in size.  No new suspicious uptake.     05/10/22:-  right lower extremity Doppler negative for DVT       4/13/22 PET CT: multiple hypermetabolic LNs b/l neck. Right submandibular LN .7x1.7cm, SUV 7.8. Left submandibular LN 2.3x1.7cm SUV 7.2. Multiple hypermetabolic LNs in chest. Right axillary node 3.3x2cm SUV 8.1. Hypermetabolic left axillary LN SUV 6.4, 1.9x3.2cm. Hypermetabolic right paratracheal LN 2.4x1.6cm SUV 4.9. Right pulmonary hilum SUV 2.7. Subcarinal LN SUV 3.3, 1.2x3.3cm. Spleen enlarged at 15cm and demonstrates heterogeneous mild to moderate uptake SUV 5.2. Moderately intense activity in stomach and proximal duodenum with no degree of focality or discrete mass on CT. Multiple hypermetabolic LNs in abd and pelvis. Left periaortic LN SUV 6.8, 4x3.5cm. Large hypermetabolic mesenteric mass [id number] SUV 8.5. Aortocaval LN 3.5x3.8cm SUV 5.1. Hypermetabolic mass in left pelvis 5.5x7cm SUV 7. Left external iliac LN SUV 6 2.2x2.1cm. Extensive hypermetabolism anteriorly and laterally in left abdomen, associated with soft tissue thickening that is difficult to separate from bowel loops. B/l inguinal LNs up to 2.9x3.9cm, SUV 6.6    3/29/22 CT neck w/ and w/o contrast: widespread adenopathy identified in parotic glands bilateral, submandibular space, anterior and posterior cervical chains, superior mediastinum and supraclavicular and infraclavicular spaces of the  base of neck/upper chest in appearance strongly suspicious for lymphoma. Multiple subcutaneous masses are also identified in lower occipital scalp and upper neck similar to lymphadenopathy. Biopsy of prominent subcutaneous mass in right upper neck is recommended and should present the easiest location for tissue sampling    Path:  5/23/18 peripheral blood flow cytometry: CD5+ B cell population. No abnormal T cell or myeloid cell populations. CD19+, CD20+, CD5+, CD23+, CD11c+ surface kappa light chain+, CD38-, CD22+ (dim/mod), CD43+, FMC7-. Consistent with CLL     04/26/2022:  Bone marrow biopsy-marrow -trilineage hematopoiesis with 10-20% B-cell infiltrate, C5 positive B-cell population with immunophenotype typical for CLL.  CLL fish within normal limits, heavy chain mutation status cannot be determined.  6q, chromosome 12, chromosome 13, chromosome 11, chromosome 17 and t( 11;14) abnormalities not detected.     05/06/2022 left axillary lymph node core biopsy:  Mantle cell lymphoma with a subset consistent with CLL/SLL., CD5 positive monoclonal B-cell population is identified, approximately 91% of total cells, immuno phenotype is nonspecific and may be seen in CLL, marginal zone lymphoma, or large cell lymphoma.  A 2nd small monoclonal B-cell population approximately 2% of the cells identified with an immunophenotype consistent with CLL.  New genomic consultation lymphoma cells positive for CD 20, CD 5, cyclin D1 and BCL2 with a TI 67 of 10%.  There is no significant staining for CD10, BCL6.  However workup consistent with diagnosis of mantle cell lymphoma.        Past Medical History:   Diagnosis Date    Cataract     Lymphoma        Past Surgical History:   Procedure Laterality Date    HERNIA REPAIR         Family History   Problem Relation Name Age of Onset    Kidney disease Mother      Colon cancer Father      Colon cancer Sister      Breast cancer Sister      Uterine cancer Sister         Social History      Socioeconomic History    Marital status:    Tobacco Use    Smoking status: Never    Smokeless tobacco: Never   Substance and Sexual Activity    Alcohol use: Not Currently    Drug use: Never       Current Outpatient Medications   Medication Sig Dispense Refill    allopurinoL (ZYLOPRIM) 300 MG tablet Take 1 tablet (300 mg total) by mouth once daily. 30 tablet 5    aspirin (ECOTRIN) 81 MG EC tablet Take 81 mg by mouth once daily.      carvediloL (COREG) 12.5 MG tablet Take 1 tablet by mouth 2 (two) times a day.      EScitalopram oxalate (LEXAPRO) 10 MG tablet Take 1 tablet by mouth once daily.      ezetimibe (ZETIA) 10 mg tablet Take 10 mg by mouth once daily.      lenalidomide (REVLIMID) 10 mg Cap Take 10 mg by mouth As instructed TAKE 1 CAPSULE DAYS 1-21 EVERY 28 DAYS.. 21 each 0    LIDOcaine-prilocaine (EMLA) cream Apply topically as needed. Apply Cream to Mediport 60 minutes prior to infusion. 30 g 5    ondansetron (ZOFRAN) 8 MG tablet Take 1 tablet (8 mg total) by mouth every 8 (eight) hours as needed for Nausea. 30 tablet 1    ondansetron (ZOFRAN-ODT) 4 MG TbDL Take 1 tablet (4 mg total) by mouth every 8 (eight) hours as needed. 30 tablet 2    pantoprazole (PROTONIX) 40 MG tablet Take 1 tablet (40 mg total) by mouth once daily. 30 tablet 1    pregabalin (LYRICA) 50 MG capsule pregabalin 50 mg capsule   TAKE 1 CAPSULE BY MOUTH TWICE DAILY      rosuvastatin (CRESTOR) 20 MG tablet Take 20 mg by mouth once daily.      traMADoL (ULTRAM) 50 mg tablet Take 1 tablet (50 mg total) by mouth every 6 (six) hours as needed for Pain. 30 tablet 0    valACYclovir (VALTREX) 500 MG tablet Take 1 tablet (500 mg total) by mouth 2 (two) times daily. 60 tablet 3     No current facility-administered medications for this visit.       Review of patient's allergies indicates:   Allergen Reactions    Pregabalin           ROS  CONSTITUTIONAL: no fevers, no chills,  No weight loss, no  fatigue, no weakness  HEMATOLOGIC: no  abnormal bleeding, no abnormal bruising, no drenching night sweats  ONCOLOGIC: no new masses or lumps  HEENT: no vision loss, no tinnitus or hearing loss, no nose bleeding, no dysphagia, no odynophagia  CVS: no chest pain, no palpitations, no dyspnea on exertion  RESP: no shortness of breath, no hemoptysis, no cough  GI: no nausea, no vomiting, no diarrhea, no constipation, no melena, no hematochezia, no hematemesis, no abdominal pain, no increase in abdominal girth  : no dysuria, no hematuria, no hesitancy, no scrotal swelling, no discharge  INTEGUMENT: no rashes, no abnormal bruising, no nail pitting, no hyperpigmentation  NEURO: no falls, no memory loss, no paresthesias or dysesthesias, no urofecal incontinence or retention, no loss of strength on any extremity  MSK: no back pain, no new joint pain, no joint swelling  PSYCH: no suicidal or homicidal ideation, no depression, no insomnia, no anhedonia  ENDOCRINE: no heat or cold intolerance, no polyuria, no polydipsia          Physical exam     Vitals:    07/29/24 0851   BP: 112/77   Pulse: 65   Resp: 14   Temp: 97.9 °F (36.6 °C)       GEN: AAOx3, NAD  HEENT: PERRLA, EOMI, edentulous, no oral ulcers  NECK:  Supple, full range of motion  LYMPH:  No axillary, supra clavicle or cervical adenopathy appreciated on today's exam.  CVS: s1s2 RRR, no M/R/G, port in place in right chest wall without surrounding edema or erythema.  RESP: CTA b/l, no crackles, no wheezes or rhonchi, well-healed herpes zoster rash.  ABD: soft, NT, ND, BS+, no hepatomegaly, mild splenomegaly,   EXT: no deformities, right lower extremity edema.  SKIN:  No rash,.  No evidence of petechia.  NEURO: normal mentation, strength 5/5 on all 4 extremities, no sensory deficits       ASSESSMENT:     # Mantle cell lymphoma, [place]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  Positive for CLL.  PET-CT with  evidence of bulky lymphadenopathy, largest measuring 14 cm, spleen measuring 15 cm.  Discussed with patient and his family the diagnosis, natural history and treatment options.  Discussed the risk for TLS given bulky lymphadenopathy, perforation given lymphadenopathy around the bowel loops as well as declining ECOG given his advanced age.  Continuet PJP prophylaxis with Bactrim Monday Wednesday Friday and valacyclovir with treatment.  Continue allopurinol 300 mg q.day for TLS prophylaxis.  Patient is status post cycle 1 of bendamustine and rituximab on 05/18/2022.  Patient has bendamustine was reduced by 25% and rituximab was given 1 week apart to reduce the risk of perforation.  S/p cycle 2 and cycle 3 without significant side effects, dosed for bendamustine increased to 100%.  Discussed with patient interim PET-CT with excellent response to treatment however with residual hypermetabolism in right axillary lymph node with a Deauville score of 4.  Given his excellent tolerance of treatment, will proceed to complete 6 cycles of BR in the setting of above PET-CT report.  Discussed alternate treatment plan to proceed with ibrutinib given partial response.  Patient elected to proceed with another 2 cycles of BR with plans to repeat PET-CT after completion of treatment to assess treatment response.  Given incomplete response on PET-CT after completion of 6 hours AL, he was started on ibrutinib in second-line initially at 560 mg q.day.    Given patient's thrombocytopenia, dose was reduced to 420 mg in March 2023  PET-CT in April 2023 with excellent response with resolution of axillary adenopathy  PET-CT in August 2023 with no evidence of disease  PET-CT in December 2023 with findings concerning for disease recurrence  Patient was started back on ibrutinib 140 mg q.day in the last week of January 2024.    Patient had multiple interruptions on ibrutinib due to persistent/worsening thrombocytopenia  PET-CT in May 2024 with  findings consistent with progression of disease  Discussed with patient and family the plan to initiate 3rd line treatment with Revlimid/rituximab.  Discussed the palliative nature of treatment and incurable nature of disease.  Patient was like to proceed with therapy.  Patient was initiated rituximab Revlimid on 06/03/2024.  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      Based on available information, likely SLL/CLL with some constitutional symptoms including weight loss, fatigue and night sweats. Adenopathy easily palpable above and below the diaphragm. Mild thrombocytopenia w/ PLT count ~120k and very mild anemia with Hg ~12 g/dl. No hypercalcemia, no evidence of immune paresis  Biopsies 4/11/22 with Dr Brannon were FNAs and non diagnostic for CLL. I do not know what IHC was obtained or whether pathologist knew what the potential diagnosis was as report only says negative for carcinoma. Will discuss with Dr Lan to add CD5, CD10, CD19, CD20 etc if enough tissue  Regardless, will also refer for axillary LN biopsy, either excisional or at the very least core. Referral sent today  PET CT 4/2022 findings are noted. Given his advanced age he was not refer for EGD. Masses in abdomen are likely matted gurinder conglomerates and I do not think it would be worth it to attempt a biopsy of those  CLL FISH  Negative , IGHV mutation status  Hep B and C negative  Allopurinol 300mg daily with PO hydration prior to initiation of any therapy  Given concomitant diagnosis of CLL and mantle cell lymphoma will plan to initiate treatment for mantle cell lymphoma and CLL with bendamustine rituximab.  Will consider BTK inhibitors if patient is intolerant to bendamustine/rituximab in the second-line      Plan:  Reviewed labs, proceed with cycle 3 day 1 treatment today  Continue dose reduced Revlimid to 10 mg days 1-21 every 28 days due to thrombocytopenia  and mild leukopenia.  Plan to follow-up in 4 weeks prior to cycle 4 day 1 to assess treatment tolerance  PET-CT prior to next follow-up visit to assess treatment response  Labs including LDH and uric acid prior to every treatment    Portions of the record may have been created with voice recognition software. Occasional wrong-word or sound-a-like substitutions may have occurred due to the inherent limitations of voice recognition software.

## 2024-08-22 DIAGNOSIS — C83.13 MANTLE CELL LYMPHOMA OF INTRA-ABDOMINAL LYMPH NODES: ICD-10-CM

## 2024-08-22 RX ORDER — LENALIDOMIDE 10 MG/1
10 CAPSULE ORAL SEE ADMIN INSTRUCTIONS
Qty: 21 EACH | Refills: 0 | Status: SHIPPED | OUTPATIENT
Start: 2024-08-22

## 2024-08-26 ENCOUNTER — OFFICE VISIT (OUTPATIENT)
Dept: HEMATOLOGY/ONCOLOGY | Facility: CLINIC | Age: 84
End: 2024-08-26
Payer: MEDICARE

## 2024-08-26 VITALS
RESPIRATION RATE: 14 BRPM | SYSTOLIC BLOOD PRESSURE: 116 MMHG | BODY MASS INDEX: 28.68 KG/M2 | OXYGEN SATURATION: 95 % | DIASTOLIC BLOOD PRESSURE: 79 MMHG | HEART RATE: 73 BPM | HEIGHT: 64 IN | TEMPERATURE: 98 F | WEIGHT: 168 LBS

## 2024-08-26 DIAGNOSIS — Z51.11 ENCOUNTER FOR ANTINEOPLASTIC CHEMOTHERAPY: ICD-10-CM

## 2024-08-26 DIAGNOSIS — D69.6 THROMBOCYTOPENIA: ICD-10-CM

## 2024-08-26 DIAGNOSIS — C83.13 MANTLE CELL LYMPHOMA OF INTRA-ABDOMINAL LYMPH NODES: Primary | ICD-10-CM

## 2024-08-26 PROCEDURE — 99215 OFFICE O/P EST HI 40 MIN: CPT | Mod: ,,, | Performed by: STUDENT IN AN ORGANIZED HEALTH CARE EDUCATION/TRAINING PROGRAM

## 2024-08-26 RX ORDER — ACETAMINOPHEN 325 MG/1
650 TABLET ORAL
Status: CANCELLED | OUTPATIENT
Start: 2024-08-26

## 2024-08-26 RX ORDER — SODIUM CHLORIDE 0.9 % (FLUSH) 0.9 %
10 SYRINGE (ML) INJECTION
Status: CANCELLED | OUTPATIENT
Start: 2024-08-26

## 2024-08-26 RX ORDER — HEPARIN 100 UNIT/ML
500 SYRINGE INTRAVENOUS
Status: CANCELLED | OUTPATIENT
Start: 2024-08-26

## 2024-08-26 RX ORDER — MEPERIDINE HYDROCHLORIDE 50 MG/ML
25 INJECTION INTRAMUSCULAR; INTRAVENOUS; SUBCUTANEOUS
Status: CANCELLED | OUTPATIENT
Start: 2024-08-26

## 2024-08-26 RX ORDER — DIPHENHYDRAMINE HCL 25 MG
50 CAPSULE ORAL
Status: CANCELLED | OUTPATIENT
Start: 2024-08-26

## 2024-08-26 RX ORDER — FAMOTIDINE 20 MG/1
20 TABLET, FILM COATED ORAL
Status: CANCELLED | OUTPATIENT
Start: 2024-08-26

## 2024-08-26 NOTE — PROGRESS NOTES
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     Treatment history    05/18/2022-10/26/2022:  6 cycles of bendamustine rituximab  December 2022:  Ibrutinib 560 mg q.day  03/16/2023-07/18/2023:  Ibrutinib 420 mg q.d.  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 He also had a single visit with Dr Snow in 2018 but never followed back.   Patient had a PET-CT as well as a bone marrow biopsy and repeat peripheral flow cytometry with a diagnosis of CLL/SLL and mantle cell lymphoma.  Patient had symptoms of fatigue and weight loss prior to his workup and diagnosis.  He was diagnosed with mantle cell lymphoma and CLL and started on treatment with bendamustine rituximab, with incomplete response following which he was started on ibrutinib.      Interval history     Today, 08/26/24, patient denies any acute concerns.  He denies any fevers, chills, night sweats, decreased appetite or weight loss.  He reports abdominal bloating but reports improvement in his symptoms after having a bowel movement.  He denies any new medications, ER or hospital visits since his last follow-up with us.  He reports tolerating his last cycle of treatment well without any significant side effects      Labs:    08/25/24: Creatinine 1.01, , LFT's wnl, WBC 3.86, hemoglobin 13.8, MCV 1 1.4, platelet count 132 1000, ANC 2.52.    07/28/2024:  CMP unremarkable, WBC count 2.76, hemoglobin 13.2,  MCV 98.5, MCV 98.5, platelet count 151, ANC 1.6.    06/23/2024:  Creatinine 0.9, albumin 3.8, calcium 10.0, LFTs unremarkable, uric acid 6.1, , WBC count 2.3, hemoglobin 13.5, MCV 99.3, platelet count 108, ANC 1.41 hepatitis-B total core antibody negative    06/09/2024 creatinine 0.8, albumin 3.6, LFTs unremarkable, LDH 02/08/2028, uric acid 6.9, hepatitis-B surface antigen nonreactive, WBC count 2.97, hemoglobin 13.5, platelet count 93913, ANC 2.31.    05/09/2024 WBC count 3.47, hemoglobin 13.8, platelet count 72035, ANC 2.37  04/11/2024 creatinine 0.96, albumin 3.3, LFTs unremarkable, , TSH 2.7, folic acid 11.4, vitamin B12 479, WBC count 2.7, hemoglobin 13.3, MCV 99, platelet count 31163  03/14/2024 CMP unremarkable, , creatinine 0.86, WBC count 3.5, hemoglobin 13.7, MCV 99.8, platelet count 19773, ANC 2.18.  02/15/2024:  Creatinine 1.08, albumin 3.5, calcium 9.6, alkaline phosphatase 95, total bilirubin 0.5, AST 40, ALT 49, , WBC count 3.47, hemoglobin 14.2, .7, platelet count 24686, ANC 2.18.  12/18/2023 creatinine 0.85, albumin 3.4, calcium 9.4, LFTs unremarkable, WBC count 3.8, hemoglobin 14.2, .5, platelet count 130, ANC 2.94.  10/09/2023 creatinine 0.8, LFTs unremarkable, uric acid 6.2, , WBC count 3.1, hemoglobin 13.8, .7, platelet count 50602, ANC 2.16.  08/14/2023 CMP unremarkable, , uric acid 6.8, WBC count 4.23, hemoglobin 13.6, , platelet count 338074, ANC 2.87.  06/14/23:  wbc 3.26, hgb 13.9, plt 102, ANC 2.21 Cr 1.14 total bili 0.4 AST 26 ALT 23   05/31/23: wbc 3.69, hgb 12.9, plt 4,000, ANC 2.58  05/26/23: cr 1.11, alb 3.6, ca 9.3, LFTs WNL, , uric acid 6.7, mag 2.1, phosphorus 2.7  05/03/2023:  Creatinine 0.86, albumin 3.6, calcium 9.3, alkaline phosphatase 61, total bili 0.5, AST 19, ALT 16, magnesium 2.2, phosphorus 3.2, , uric acid 6.5, WBC count 2.6, hemoglobin 12.7, .3, platelet count 124, ANC  1.69.  04/12/2023:  Creatinine 0.83, albumin 3.5, calcium 9.2, , uric acid 3.8, LFTs within normal limits, magnesium 2.0, phosphorus 2.6, WBC count 2.7, hemoglobin 12.6, ANC 2000, ALC 0.28.  03/28/23: CMP unremarkable except ca 10.2, mag 2.2, phosphorus 3.0, , uric acid 4.2, wbc 3.43, hgb 13.3, plt 78, ANC 2.48  03/10/23: CMP unremarkable, wbc 2.57, hgb 12.8, plt 87, ANC 1.85  03/01/23: cr 0.96, ca 9.2, alb 3.4, AST 35, ALT 24, , mag 1.9, phosphorus 3.4, uric acid 3.8, wbc 3.23, hgb 12.5, plt 74,000, ANC 2.39  02/01/23: Cr 0.92, ca 9.6, alb 3.6, LFTs WNL, wbc 3.08, hgb 12.6, plt 102, ANC 2.15, ALC 0.25  01/20/23; cr 0.97, alb 3.5, LFTs WNL, wbc 3.06, hgb 12.8, plt 64, ANC 2.25  11/22/22: cr 0.86, alb 3.7, LFTs WNL, , uric acid 5.1, wbc 2.16, hgb 12.8, plt 76, mcv 107.8, ALC 0.35, ANC 1.11  10/25/2022:  Creatinine 0.89, albumin 3.5, LFTs within normal limits, , uric acid 3.6.  WBC count 3.15, hemoglobin 12.7, .3, platelet count 125 1000, ANC 2.28.  10/11/2022:  Creatinine 1.04, albumin 3.6, LFTs within normal limits, magnesium 2.0, phosphorus 2.7, , uric acid 3.2, WBC 2.4, hemoglobin 13.4, .5, platelet count 38235.  ANC 1.77.  09/13/2022:  Creatinine 0.80, albumin 3.7, LFTs within normal limits, , uric acid 4.0, WBC count 2.37, hemoglobin 13.2, , platelet count 58314, ANC 1.45.  08/16/2022:  Creatinine 0.81, albumin 3.8, calcium 9.2, total bili 0.5, LFTs within normal limits, uric acid 3.3, , WBC 3.45, hemoglobin 13.7, .8, platelet count 90557, ANC 2.06.  07/19/2022:  WBC 3.75, hemoglobin 13.1, .5, platelet count 25108, ANC 2.32, uric acid 4.1, , creatinine 1.09, albumin 3.5, calcium 9.6, LFTs within normal limits.  06/21/2022 WBC at 2.58 bed neutrophil adequate at 55%.  Hemoglobin 12.7, hematocrit 39.4.  06/14/2022:  Creatinine 0.99, calcium 9.1, , uric acid 3.7, phosphorus 3.3, potassium 4.5, WBC count 2.58,  hemoglobin 12.7, platelet count 11222, ANC 1.44.  2022:  Potassium 4.9, sodium 140, creatinine 0.87, , uric acid 6.6.  2022:  Creatinine 1.03, albumin 3.3, ,  WBC count 10.32, hemoglobin 12.4, platelet count 135 1000.   2022:  Creatinine 0.93, albumin 3.6, total protein 6.4, , potassium 5.2, WBC count 9.6, hemoglobin 11.4, .4, platelet count 92.   4/ Cr 0.89 Alb 3.4 TP 6.4 Ca 9.4  TSH 2.0 Iron 58n TIBC 285 Ferritin 48 Folate 9.9 B12 426 Hep B s ag neg Uric acid 7.8 Hep C ab neg WBC 9.79 RBC 3.77 Hg 11.8 .5 RDW 13.4 PLT 91 ANC 2.79 ALC 6.36 Direct Ramos neg b2 micro 7.5 Copper 135 Hapto 225 Hep B core ab neg SPEP w/ ARABELLA normal Abs kappa 32 Abs lambda 124 SFLC ratio 0.26  3/18/22 Cr 1 AST 27 ALT 12 AlkPhos 87 Alb 4.1 TP 6.5 Ca 9.8 WBC 9.4 RBC 3.92 Hg 12.2 MCV 96 RDW 15.1  ALC 5.8 ANC 3  5/15/18 WBC 14.71 RBC 4.42 Hg 14.3 MCV 98.6 RDW 13  ANC 2.91 ALC 10.13 AMC 1.29    Imagin2024 PET-CT multiple hypermetabolic lymph nodes above and below the diaphragm in keeping with the known lymphoma.  Indeterminate small nodular densities in both lungs with mild uptake.  Subtle ground-glass in the left lung with mild-to-moderate uptake, which may be inflammatory in etiology, however continued close monitoring is recommended.  No other suspicious uptake.    2024 PET-CT:  Interval progression of lymphoma with increased size and number on metabolically activity of multiple hypermetabolic lymph nodes above and below the diaphragm.  Interval increased size of spleen with diffuse moderate intense uptake also concerning for lymphomatous involvement.  No other suspicious uptake    2023 PET-CT:  Interval development of mild-to-moderate uptake in multiple small lymph nodes in the chest and abdomen which is somewhat concerning given the history of lymphoma, continued close monitoring is recommended.  Interval development of nonspecific mild  diffuse splenic uptake clinical recommendation is recommended.  Interval development of subtle bilateral ground-glass opacities with mild uptake, which is being favored being inflammatory in etiology, attention on follow-up    08/17/2023 PET-CT:  Stable PET-CT without evidence of metabolically active residual or recurrent lymphoma.    04/12/2023:  PET-CT-interval resolution of moderate uptake in right axillary lymph node with no current PET evidence of metabolically active residual or recurrent lymphoma.    11/7/22 PET CT: Stable right axillary lymph node with mild-to-moderate uptake, representing residual metabolically active tumor. No new suspicious uptake noted     08/25/2022 PET-CT:  Excellent response to therapy since her last exam with interval resolution or near complete resolution of multiple hypermetabolic lymph nodes above and below the diaphragm.  There is residual mild-to-moderate uptake in the right axillary lymph node, Deauville score 4  Interval resolution of diffuse uptake in spleen which is also decreased in size.  No new suspicious uptake.     05/10/22:-  right lower extremity Doppler negative for DVT       4/13/22 PET CT: multiple hypermetabolic LNs b/l neck. Right submandibular LN .7x1.7cm, SUV 7.8. Left submandibular LN 2.3x1.7cm SUV 7.2. Multiple hypermetabolic LNs in chest. Right axillary node 3.3x2cm SUV 8.1. Hypermetabolic left axillary LN SUV 6.4, 1.9x3.2cm. Hypermetabolic right paratracheal LN 2.4x1.6cm SUV 4.9. Right pulmonary hilum SUV 2.7. Subcarinal LN SUV 3.3, 1.2x3.3cm. Spleen enlarged at 15cm and demonstrates heterogeneous mild to moderate uptake SUV 5.2. Moderately intense activity in stomach and proximal duodenum with no degree of focality or discrete mass on CT. Multiple hypermetabolic LNs in abd and pelvis. Left periaortic LN SUV 6.8, 4x3.5cm. Large hypermetabolic mesenteric mass 1u2u30hs SUV 8.5. Aortocaval LN 3.5x3.8cm SUV 5.1. Hypermetabolic mass in left pelvis 5.5x7cm SUV 7.  Left external iliac LN SUV 6 2.2x2.1cm. Extensive hypermetabolism anteriorly and laterally in left abdomen, associated with soft tissue thickening that is difficult to separate from bowel loops. B/l inguinal LNs up to 2.9x3.9cm, SUV 6.6    3/29/22 CT neck w/ and w/o contrast: widespread adenopathy identified in parotic glands bilateral, submandibular space, anterior and posterior cervical chains, superior mediastinum and supraclavicular and infraclavicular spaces of the base of neck/upper chest in appearance strongly suspicious for lymphoma. Multiple subcutaneous masses are also identified in lower occipital scalp and upper neck similar to lymphadenopathy. Biopsy of prominent subcutaneous mass in right upper neck is recommended and should present the easiest location for tissue sampling    Path:  5/23/18 peripheral blood flow cytometry: CD5+ B cell population. No abnormal T cell or myeloid cell populations. CD19+, CD20+, CD5+, CD23+, CD11c+ surface kappa light chain+, CD38-, CD22+ (dim/mod), CD43+, FMC7-. Consistent with CLL     04/26/2022:  Bone marrow biopsy-marrow -trilineage hematopoiesis with 10-20% B-cell infiltrate, C5 positive B-cell population with immunophenotype typical for CLL.  CLL fish within normal limits, heavy chain mutation status cannot be determined.  6q, chromosome 12, chromosome 13, chromosome 11, chromosome 17 and t( 11;14) abnormalities not detected.     05/06/2022 left axillary lymph node core biopsy:  Mantle cell lymphoma with a subset consistent with CLL/SLL., CD5 positive monoclonal B-cell population is identified, approximately 91% of total cells, immuno phenotype is nonspecific and may be seen in CLL, marginal zone lymphoma, or large cell lymphoma.  A 2nd small monoclonal B-cell population approximately 2% of the cells identified with an immunophenotype consistent with CLL.  New genomic consultation lymphoma cells positive for CD 20, CD 5, cyclin D1 and BCL2 with a TI 67 of 10%.   There is no significant staining for CD10, BCL6.  However workup consistent with diagnosis of mantle cell lymphoma.        Past Medical History:   Diagnosis Date    Cataract     Lymphoma        Past Surgical History:   Procedure Laterality Date    HERNIA REPAIR         Family History   Problem Relation Name Age of Onset    Kidney disease Mother      Colon cancer Father      Colon cancer Sister      Breast cancer Sister      Uterine cancer Sister         Social History     Socioeconomic History    Marital status:    Tobacco Use    Smoking status: Never    Smokeless tobacco: Never   Substance and Sexual Activity    Alcohol use: Not Currently    Drug use: Never       Current Outpatient Medications   Medication Sig Dispense Refill    allopurinoL (ZYLOPRIM) 300 MG tablet Take 1 tablet (300 mg total) by mouth once daily. 30 tablet 5    aspirin (ECOTRIN) 81 MG EC tablet Take 81 mg by mouth once daily.      carvediloL (COREG) 12.5 MG tablet Take 1 tablet by mouth 2 (two) times a day.      EScitalopram oxalate (LEXAPRO) 10 MG tablet Take 1 tablet by mouth once daily.      ezetimibe (ZETIA) 10 mg tablet Take 10 mg by mouth once daily.      lenalidomide (REVLIMID) 10 mg Cap Take 10 mg by mouth As instructed TAKE 1 CAPSULE DAYS 1-21 EVERY 28 DAYS.. 21 each 0    LIDOcaine-prilocaine (EMLA) cream Apply topically as needed. Apply Cream to Mediport 60 minutes prior to infusion. 30 g 5    ondansetron (ZOFRAN) 8 MG tablet Take 1 tablet (8 mg total) by mouth every 8 (eight) hours as needed for Nausea. 30 tablet 1    ondansetron (ZOFRAN-ODT) 4 MG TbDL Take 1 tablet (4 mg total) by mouth every 8 (eight) hours as needed. 30 tablet 2    pantoprazole (PROTONIX) 40 MG tablet Take 1 tablet (40 mg total) by mouth once daily. 30 tablet 1    pregabalin (LYRICA) 50 MG capsule pregabalin 50 mg capsule   TAKE 1 CAPSULE BY MOUTH TWICE DAILY      rosuvastatin (CRESTOR) 20 MG tablet Take 20 mg by mouth once daily.      traMADoL (ULTRAM) 50 mg  tablet Take 1 tablet (50 mg total) by mouth every 6 (six) hours as needed for Pain. 30 tablet 0    valACYclovir (VALTREX) 500 MG tablet Take 1 tablet (500 mg total) by mouth 2 (two) times daily. 60 tablet 3     No current facility-administered medications for this visit.       Review of patient's allergies indicates:   Allergen Reactions    Pregabalin           ROS  CONSTITUTIONAL: no fevers, no chills,  No weight loss, no  fatigue, no weakness  HEMATOLOGIC: no abnormal bleeding, no abnormal bruising, no drenching night sweats  ONCOLOGIC: no new masses or lumps  HEENT: no vision loss, no tinnitus or hearing loss, no nose bleeding, no dysphagia, no odynophagia  CVS: no chest pain, no palpitations, no dyspnea on exertion  RESP: no shortness of breath, no hemoptysis, no cough  GI: no nausea, no vomiting, no diarrhea, no constipation, no melena, no hematochezia, no hematemesis, no abdominal pain, no increase in abdominal girth  : no dysuria, no hematuria, no hesitancy, no scrotal swelling, no discharge  INTEGUMENT: no rashes, no abnormal bruising, no nail pitting, no hyperpigmentation  NEURO: no falls, no memory loss, no paresthesias or dysesthesias, no urofecal incontinence or retention, no loss of strength on any extremity  MSK: no back pain, no new joint pain, no joint swelling  PSYCH: no suicidal or homicidal ideation, no depression, no insomnia, no anhedonia  ENDOCRINE: no heat or cold intolerance, no polyuria, no polydipsia          Physical exam     Vitals:    08/26/24 0846   BP: 116/79   Pulse: 73   Resp: 14   Temp: 98 °F (36.7 °C)       Physical Exam:  GA: AAOx3, NAD  HEENT:  moist oral mucous membranes  RESP:  Equal chest rise, no accessory muscle use  ABDOMEN:  Soft, nondistended, nontender to palpation, no guarding or rigidity.  EXT: no deformities, no pedal edema  SKIN: no rashes, warm and dry  NEURO: normal mentation, no gross neuro deficits           ASSESSMENT:     # Mantle cell lymphoma, [place]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  Positive for CLL.  PET-CT with evidence of bulky lymphadenopathy, largest measuring 14 cm, spleen measuring 15 cm.  Discussed with patient and his family the diagnosis, natural history and treatment options.  Discussed the risk for TLS given bulky lymphadenopathy, perforation given lymphadenopathy around the bowel loops as well as declining ECOG given his advanced age.  Continuet PJP prophylaxis with Bactrim Monday Wednesday Friday and valacyclovir with treatment.  Continue allopurinol 300 mg q.day for TLS prophylaxis.  Patient is status post cycle 1 of bendamustine and rituximab on 05/18/2022.  Patient has bendamustine was reduced by 25% and rituximab was given 1 week apart to reduce the risk of perforation.  S/p cycle 2 and cycle 3 without significant side effects, dosed for bendamustine increased to 100%.  Discussed with patient interim PET-CT with excellent response to treatment however with residual hypermetabolism in right axillary lymph node with a Deauville score of 4.  Given his excellent tolerance of treatment, will proceed to complete 6 cycles of BR in the setting of above PET-CT report.  Discussed alternate treatment plan to proceed with ibrutinib given partial response.  Patient elected to proceed with another 2 cycles of BR with plans to repeat PET-CT after completion of treatment to assess treatment response.  Given incomplete response on PET-CT after completion of 6 hours ID, he was started on ibrutinib in second-line initially at 560 mg q.day.    Given patient's thrombocytopenia, dose was reduced to 420 mg in March 2023  PET-CT in April 2023 with excellent response with resolution of axillary adenopathy  PET-CT in August 2023 with no evidence of disease  PET-CT in December 2023 with findings concerning for disease recurrence  Patient was started back on  ibrutinib 140 mg q.day in the last week of January 2024.    Patient had multiple interruptions on ibrutinib due to persistent/worsening thrombocytopenia  PET-CT in May 2024 with findings consistent with progression of disease  Discussed with patient and family the plan to initiate 3rd line treatment with Revlimid/rituximab.  Discussed the palliative nature of treatment and incurable nature of disease.  Patient was like to proceed with therapy.  Patient was initiated rituximab Revlimid on 06/03/2024.  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      Based on available information, likely SLL/CLL with some constitutional symptoms including weight loss, fatigue and night sweats. Adenopathy easily palpable above and below the diaphragm. Mild thrombocytopenia w/ PLT count ~120k and very mild anemia with Hg ~12 g/dl. No hypercalcemia, no evidence of immune paresis  Biopsies 4/11/22 with Dr Brannon were FNAs and non diagnostic for CLL. I do not know what IHC was obtained or whether pathologist knew what the potential diagnosis was as report only says negative for carcinoma. Will discuss with Dr Lan to add CD5, CD10, CD19, CD20 etc if enough tissue  Regardless, will also refer for axillary LN biopsy, either excisional or at the very least core. Referral sent today  PET CT 4/2022 findings are noted. Given his advanced age he was not refer for EGD. Masses in abdomen are likely matted gurinder conglomerates and I do not think it would be worth it to attempt a biopsy of those  CLL FISH  Negative , IGHV mutation status  Hep B and C negative  Allopurinol 300mg daily with PO hydration prior to initiation of any therapy  Given concomitant diagnosis of CLL and mantle cell lymphoma will plan to initiate treatment for mantle cell lymphoma and CLL with bendamustine rituximab.  Will consider BTK inhibitors if patient is intolerant to  bendamustine/rituximab in the second-line      Plan:  Noted PET-CT from August 2024 with persistent adenopathy, however no comparison made to prior scans.  I have discussed this with radiology who will be adding an addendum to this report document progression or treatment response.  Reviewed labs, proceed with cycle 4 day 1 treatment today pending above PET-CT report  Continue dose reduced Revlimid to 10 mg days 1-21 every 28 days due to thrombocytopenia and mild leukopenia.  Plan to follow-up in 2 weeks, no labs to discuss PET-CT report and further treatment recommendations    Portions of the record may have been created with voice recognition software. Occasional wrong-word or sound-a-like substitutions may have occurred due to the inherent limitations of voice recognition software.

## 2024-08-28 ENCOUNTER — TELEPHONE (OUTPATIENT)
Dept: HEMATOLOGY/ONCOLOGY | Facility: CLINIC | Age: 84
End: 2024-08-28
Payer: MEDICARE

## 2024-08-28 DIAGNOSIS — G89.3 CANCER RELATED PAIN: Primary | ICD-10-CM

## 2024-08-28 DIAGNOSIS — C83.13 MANTLE CELL LYMPHOMA OF INTRA-ABDOMINAL LYMPH NODES: ICD-10-CM

## 2024-08-28 RX ORDER — OXYCODONE HYDROCHLORIDE 5 MG/1
5 TABLET ORAL EVERY 6 HOURS PRN
Qty: 30 TABLET | Refills: 0 | Status: SHIPPED | OUTPATIENT
Start: 2024-08-28

## 2024-08-28 NOTE — TELEPHONE ENCOUNTER
Pt's daughter reports Pt contacted her last night complaining of lower abdominal pain, back pain, and hip pain which is not relieved with Tylenol. Reports trouble sleeping the night before d/t pain. Also reports abdominal swelling. Denies nausea, vomiting, constipation, and diarrhea. Please advise.--SC, LPN

## 2024-09-03 ENCOUNTER — TELEPHONE (OUTPATIENT)
Dept: HEMATOLOGY/ONCOLOGY | Facility: CLINIC | Age: 84
End: 2024-09-03
Payer: MEDICARE

## 2024-09-03 DIAGNOSIS — C83.13 MANTLE CELL LYMPHOMA OF INTRA-ABDOMINAL LYMPH NODES: ICD-10-CM

## 2024-09-03 DIAGNOSIS — G89.3 CANCER RELATED PAIN: ICD-10-CM

## 2024-09-03 RX ORDER — OXYCODONE HYDROCHLORIDE 10 MG/1
10 TABLET ORAL EVERY 4 HOURS PRN
Qty: 60 TABLET | Refills: 0 | Status: SHIPPED | OUTPATIENT
Start: 2024-09-03

## 2024-09-03 NOTE — TELEPHONE ENCOUNTER
Pt's daughter reports Pt continues to have hip and abdominal pain that was so sever yesterday Pt went to [facility]. Reports taking 2 Oxycodone as instructed and moderately relieves pain for a few hours. Records printed and scanned in Epic. Received PET/CT addendum scanned in Epic. Please advise.--SC, LPN

## 2024-09-03 NOTE — TELEPHONE ENCOUNTER
Spoke with daughter reports unable to come in tomorrow with Pt d/t in [place]. States Pt's pain is relieved with Oxycodone 10mg for 3-4 hours but will need refills. Appt scheduled for 9/6 at 9:20am and daughter notified.--SC, DIALLO

## 2024-09-06 ENCOUNTER — OFFICE VISIT (OUTPATIENT)
Dept: HEMATOLOGY/ONCOLOGY | Facility: CLINIC | Age: 84
End: 2024-09-06
Payer: MEDICARE

## 2024-09-06 VITALS
HEIGHT: 64 IN | SYSTOLIC BLOOD PRESSURE: 121 MMHG | DIASTOLIC BLOOD PRESSURE: 80 MMHG | OXYGEN SATURATION: 98 % | WEIGHT: 167.5 LBS | HEART RATE: 74 BPM | RESPIRATION RATE: 18 BRPM | TEMPERATURE: 98 F | BODY MASS INDEX: 28.6 KG/M2

## 2024-09-06 DIAGNOSIS — C83.13 MANTLE CELL LYMPHOMA OF INTRA-ABDOMINAL LYMPH NODES: Primary | ICD-10-CM

## 2024-09-06 RX ORDER — FLUCONAZOLE 200 MG/1
400 TABLET ORAL DAILY
Qty: 30 TABLET | Refills: 0 | Status: SHIPPED | OUTPATIENT
Start: 2024-09-06 | End: 2024-10-06

## 2024-09-06 RX ORDER — ALLOPURINOL 300 MG/1
300 TABLET ORAL DAILY
Qty: 30 TABLET | Refills: 3 | Status: SHIPPED | OUTPATIENT
Start: 2024-09-06

## 2024-09-06 RX ORDER — NALOXONE HYDROCHLORIDE 4 MG/.1ML
SPRAY NASAL
COMMUNITY
Start: 2024-09-04

## 2024-09-06 NOTE — PROGRESS NOTES
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     Treatment history    05/18/2022-10/26/2022:  6 cycles of bendamustine rituximab  December 2022:  Ibrutinib 560 mg q.day  03/16/2023-07/18/2023:  Ibrutinib 420 mg q.d.  07/19/2023-08/18/2023: Ibrutinib 280 mg q.day  01/22/2023-05/24/2024:  Ibrutinib 140 mg q.day, days 1-21 every 28 days due to persistent thrombocytopenia  06/03/2024-09/06/2024: Revlimid plus rituximab    Current treatment     Plan to initiate venetoclax ramp up.      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 He also had a single visit with Dr Snow in 2018 but never followed back.   Patient had a PET-CT as well as a bone marrow biopsy and repeat peripheral flow cytometry with a diagnosis of CLL/SLL and mantle cell lymphoma.  Patient had symptoms of fatigue and weight loss prior to his workup and diagnosis.  He was diagnosed with mantle cell lymphoma and CLL and started on treatment with bendamustine rituximab, with incomplete response following which he was started on ibrutinib.      Interval history     Today, 09/06/2024, patient was had progressive symptoms of pain in his abdomen, currently controlled on oxycodone 10 mg q.4 hours.  He reports worsening night sweats as well.  He reports fatigue.  Reports tolerating his last cycle of treatment.  He denies any fevers new lumps or bumps.      Labs:    09/02/2024 creatinine 1.01, albumin 3.1, LFTs unremarkable, WBC count 3.19, hemoglobin 13.0, MCV 99.5, platelet count 1 110, ANC 2.49.    08/25/24: Creatinine 1.01, , LFT's wnl, WBC 3.86, hemoglobin 13.8, MCV 1 1.4, platelet count 132 1000, ANC 2.52.    07/28/2024:   CMP unremarkable, WBC count 2.76, hemoglobin 13.2, MCV 98.5, MCV 98.5, platelet count 151, ANC 1.6.    06/23/2024:  Creatinine 0.9, albumin 3.8, calcium 10.0, LFTs unremarkable, uric acid 6.1, , WBC count 2.3, hemoglobin 13.5, MCV 99.3, platelet count 108, ANC 1.41 hepatitis-B total core antibody negative    06/09/2024 creatinine 0.8, albumin 3.6, LFTs unremarkable, LDH 02/08/2028, uric acid 6.9, hepatitis-B surface antigen nonreactive, WBC count 2.97, hemoglobin 13.5, platelet count 28974, ANC 2.31.    05/09/2024 WBC count 3.47, hemoglobin 13.8, platelet count 90790, ANC 2.37  04/11/2024 creatinine 0.96, albumin 3.3, LFTs unremarkable, , TSH 2.7, folic acid 11.4, vitamin B12 479, WBC count 2.7, hemoglobin 13.3, MCV 99, platelet count 06003  03/14/2024 CMP unremarkable, , creatinine 0.86, WBC count 3.5, hemoglobin 13.7, MCV 99.8, platelet count 67469, ANC 2.18.  02/15/2024:  Creatinine 1.08, albumin 3.5, calcium 9.6, alkaline phosphatase 95, total bilirubin 0.5, AST 40, ALT 49, , WBC count 3.47, hemoglobin 14.2, .7, platelet count 76746, ANC 2.18.  12/18/2023 creatinine 0.85, albumin 3.4, calcium 9.4, LFTs unremarkable, WBC count 3.8, hemoglobin 14.2, .5, platelet count 130, ANC 2.94.  10/09/2023 creatinine 0.8, LFTs unremarkable, uric acid 6.2, , WBC count 3.1, hemoglobin 13.8, .7, platelet count 24743, ANC 2.16.  08/14/2023 CMP unremarkable, , uric acid 6.8, WBC count 4.23, hemoglobin 13.6, , platelet count [id number], ANC 2.87.  06/14/23:  wbc 3.26, hgb 13.9, plt 102, ANC 2.21 Cr 1.14 total bili 0.4 AST 26 ALT 23   05/31/23: wbc 3.69, hgb 12.9, plt 4,000, ANC 2.58  05/26/23: cr 1.11, alb 3.6, ca 9.3, LFTs WNL, , uric acid 6.7, mag 2.1, phosphorus 2.7  05/03/2023:  Creatinine 0.86, albumin 3.6, calcium 9.3, alkaline phosphatase 61, total bili 0.5, AST 19, ALT 16, magnesium 2.2, phosphorus 3.2, , uric acid 6.5, WBC count 2.6, hemoglobin  12.7, .3, platelet count 124, ANC 1.69.  04/12/2023:  Creatinine 0.83, albumin 3.5, calcium 9.2, , uric acid 3.8, LFTs within normal limits, magnesium 2.0, phosphorus 2.6, WBC count 2.7, hemoglobin 12.6, ANC 2000, ALC 0.28.  03/28/23: CMP unremarkable except ca 10.2, mag 2.2, phosphorus 3.0, , uric acid 4.2, wbc 3.43, hgb 13.3, plt 78, ANC 2.48  03/10/23: CMP unremarkable, wbc 2.57, hgb 12.8, plt 87, ANC 1.85  03/01/23: cr 0.96, ca 9.2, alb 3.4, AST 35, ALT 24, , mag 1.9, phosphorus 3.4, uric acid 3.8, wbc 3.23, hgb 12.5, plt 74,000, ANC 2.39  02/01/23: Cr 0.92, ca 9.6, alb 3.6, LFTs WNL, wbc 3.08, hgb 12.6, plt 102, ANC 2.15, ALC 0.25  01/20/23; cr 0.97, alb 3.5, LFTs WNL, wbc 3.06, hgb 12.8, plt 64, ANC 2.25  11/22/22: cr 0.86, alb 3.7, LFTs WNL, , uric acid 5.1, wbc 2.16, hgb 12.8, plt 76, mcv 107.8, ALC 0.35, ANC 1.11  10/25/2022:  Creatinine 0.89, albumin 3.5, LFTs within normal limits, , uric acid 3.6.  WBC count 3.15, hemoglobin 12.7, .3, platelet count 125 1000, ANC 2.28.  10/11/2022:  Creatinine 1.04, albumin 3.6, LFTs within normal limits, magnesium 2.0, phosphorus 2.7, , uric acid 3.2, WBC 2.4, hemoglobin 13.4, .5, platelet count 12848.  ANC 1.77.  09/13/2022:  Creatinine 0.80, albumin 3.7, LFTs within normal limits, , uric acid 4.0, WBC count 2.37, hemoglobin 13.2, , platelet count 76483, ANC 1.45.  08/16/2022:  Creatinine 0.81, albumin 3.8, calcium 9.2, total bili 0.5, LFTs within normal limits, uric acid 3.3, , WBC 3.45, hemoglobin 13.7, .8, platelet count 79906, ANC 2.06.  07/19/2022:  WBC 3.75, hemoglobin 13.1, .5, platelet count 34807, ANC 2.32, uric acid 4.1, , creatinine 1.09, albumin 3.5, calcium 9.6, LFTs within normal limits.  06/21/2022 WBC at 2.58 bed neutrophil adequate at 55%.  Hemoglobin 12.7, hematocrit 39.4.  06/14/2022:  Creatinine 0.99, calcium 9.1, , uric acid 3.7,  phosphorus 3.3, potassium 4.5, WBC count 2.58, hemoglobin 12.7, platelet count 70184, ANC 1.44.  2022:  Potassium 4.9, sodium 140, creatinine 0.87, , uric acid 6.6.  2022:  Creatinine 1.03, albumin 3.3, ,  WBC count 10.32, hemoglobin 12.4, platelet count 135 1000.   2022:  Creatinine 0.93, albumin 3.6, total protein 6.4, , potassium 5.2, WBC count 9.6, hemoglobin 11.4, .4, platelet count 92.   4/ Cr 0.89 Alb 3.4 TP 6.4 Ca 9.4  TSH 2.0 Iron 58n TIBC 285 Ferritin 48 Folate 9.9 B12 426 Hep B s ag neg Uric acid 7.8 Hep C ab neg WBC 9.79 RBC 3.77 Hg 11.8 .5 RDW 13.4 PLT 91 ANC 2.79 ALC 6.36 Direct Ramos neg b2 micro 7.5 Copper 135 Hapto 225 Hep B core ab neg SPEP w/ ARABELLA normal Abs kappa 32 Abs lambda 124 SFLC ratio 0.26  3/18/22 Cr 1 AST 27 ALT 12 AlkPhos 87 Alb 4.1 TP 6.5 Ca 9.8 WBC 9.4 RBC 3.92 Hg 12.2 MCV 96 RDW 15.1  ALC 5.8 ANC 3  5/15/18 WBC 14.71 RBC 4.42 Hg 14.3 MCV 98.6 RDW 13  ANC 2.91 ALC 10.13 AMC 1.29    Imagin2024 PET-CT multiple hypermetabolic lymph nodes above and below the diaphragm in keeping with the known lymphoma.  Indeterminate small nodular densities in both lungs with mild uptake.  Subtle ground-glass in the left lung with mild-to-moderate uptake, which may be inflammatory in etiology, however continued close monitoring is recommended.  No other suspicious uptake.  PET-CT addendum with interval increased size and metabolically activity of multiple lymph nodes above and below the diaphragm.  A prevascular lymph node measures 3 x 2 x 2.3 cm compared to 1.5 x 1.3 with an SUV of 24.1.  The left periaortic lymph node measures 5.3 x 3.9 cm compared to 1.5 x 0.8 with an SUV of 29.1.  The mass of concurrent metallic lymph node in the right mesentery has an SUV of 29.8 and measures 8.2 x 7.6 x 6.6 cm.    2024 PET-CT:  Interval progression of lymphoma with increased size and number on metabolically activity of  multiple hypermetabolic lymph nodes above and below the diaphragm.  Interval increased size of spleen with diffuse moderate intense uptake also concerning for lymphomatous involvement.  No other suspicious uptake    12/19/2023 PET-CT:  Interval development of mild-to-moderate uptake in multiple small lymph nodes in the chest and abdomen which is somewhat concerning given the history of lymphoma, continued close monitoring is recommended.  Interval development of nonspecific mild diffuse splenic uptake clinical recommendation is recommended.  Interval development of subtle bilateral ground-glass opacities with mild uptake, which is being favored being inflammatory in etiology, attention on follow-up    08/17/2023 PET-CT:  Stable PET-CT without evidence of metabolically active residual or recurrent lymphoma.    04/12/2023:  PET-CT-interval resolution of moderate uptake in right axillary lymph node with no current PET evidence of metabolically active residual or recurrent lymphoma.    11/7/22 PET CT: Stable right axillary lymph node with mild-to-moderate uptake, representing residual metabolically active tumor. No new suspicious uptake noted     08/25/2022 PET-CT:  Excellent response to therapy since her last exam with interval resolution or near complete resolution of multiple hypermetabolic lymph nodes above and below the diaphragm.  There is residual mild-to-moderate uptake in the right axillary lymph node, Deauville score 4  Interval resolution of diffuse uptake in spleen which is also decreased in size.  No new suspicious uptake.     05/10/22:-  right lower extremity Doppler negative for DVT       4/13/22 PET CT: multiple hypermetabolic LNs b/l neck. Right submandibular LN .7x1.7cm, SUV 7.8. Left submandibular LN 2.3x1.7cm SUV 7.2. Multiple hypermetabolic LNs in chest. Right axillary node 3.3x2cm SUV 8.1. Hypermetabolic left axillary LN SUV 6.4, 1.9x3.2cm. Hypermetabolic right paratracheal LN 2.4x1.6cm SUV 4.9.  Right pulmonary hilum SUV 2.7. Subcarinal LN SUV 3.3, 1.2x3.3cm. Spleen enlarged at 15cm and demonstrates heterogeneous mild to moderate uptake SUV 5.2. Moderately intense activity in stomach and proximal duodenum with no degree of focality or discrete mass on CT. Multiple hypermetabolic LNs in abd and pelvis. Left periaortic LN SUV 6.8, 4x3.5cm. Large hypermetabolic mesenteric mass [id number] SUV 8.5. Aortocaval LN 3.5x3.8cm SUV 5.1. Hypermetabolic mass in left pelvis 5.5x7cm SUV 7. Left external iliac LN SUV 6 2.2x2.1cm. Extensive hypermetabolism anteriorly and laterally in left abdomen, associated with soft tissue thickening that is difficult to separate from bowel loops. B/l inguinal LNs up to 2.9x3.9cm, SUV 6.6    3/29/22 CT neck w/ and w/o contrast: widespread adenopathy identified in parotic glands bilateral, submandibular space, anterior and posterior cervical chains, superior mediastinum and supraclavicular and infraclavicular spaces of the base of neck/upper chest in appearance strongly suspicious for lymphoma. Multiple subcutaneous masses are also identified in lower occipital scalp and upper neck similar to lymphadenopathy. Biopsy of prominent subcutaneous mass in right upper neck is recommended and should present the easiest location for tissue sampling    Path:  5/23/18 peripheral blood flow cytometry: CD5+ B cell population. No abnormal T cell or myeloid cell populations. CD19+, CD20+, CD5+, CD23+, CD11c+ surface kappa light chain+, CD38-, CD22+ (dim/mod), CD43+, FMC7-. Consistent with CLL     04/26/2022:  Bone marrow biopsy-marrow -trilineage hematopoiesis with 10-20% B-cell infiltrate, C5 positive B-cell population with immunophenotype typical for CLL.  CLL fish within normal limits, heavy chain mutation status cannot be determined.  6q, chromosome 12, chromosome 13, chromosome 11, chromosome 17 and t( 11;14) abnormalities not detected.     05/06/2022 left axillary lymph node core biopsy:  Mantle  cell lymphoma with a subset consistent with CLL/SLL., CD5 positive monoclonal B-cell population is identified, approximately 91% of total cells, immuno phenotype is nonspecific and may be seen in CLL, marginal zone lymphoma, or large cell lymphoma.  A 2nd small monoclonal B-cell population approximately 2% of the cells identified with an immunophenotype consistent with CLL.  New genomic consultation lymphoma cells positive for CD 20, CD 5, cyclin D1 and BCL2 with a TI 67 of 10%.  There is no significant staining for CD10, BCL6.  However workup consistent with diagnosis of mantle cell lymphoma.        Past Medical History:   Diagnosis Date    Cataract     Lymphoma        Past Surgical History:   Procedure Laterality Date    HERNIA REPAIR         Family History   Problem Relation Name Age of Onset    Kidney disease Mother      Colon cancer Father      Colon cancer Sister      Breast cancer Sister      Uterine cancer Sister         Social History     Socioeconomic History    Marital status:    Tobacco Use    Smoking status: Never    Smokeless tobacco: Never   Substance and Sexual Activity    Alcohol use: Not Currently    Drug use: Never       Current Outpatient Medications   Medication Sig Dispense Refill    allopurinoL (ZYLOPRIM) 300 MG tablet Take 1 tablet (300 mg total) by mouth once daily. 30 tablet 5    aspirin (ECOTRIN) 81 MG EC tablet Take 81 mg by mouth once daily.      carvediloL (COREG) 12.5 MG tablet Take 1 tablet by mouth 2 (two) times a day.      EScitalopram oxalate (LEXAPRO) 10 MG tablet Take 1 tablet by mouth once daily.      ezetimibe (ZETIA) 10 mg tablet Take 10 mg by mouth once daily.      lenalidomide (REVLIMID) 10 mg Cap Take 10 mg by mouth As instructed TAKE 1 CAPSULE DAYS 1-21 EVERY 28 DAYS.. 21 each 0    LIDOcaine-prilocaine (EMLA) cream Apply topically as needed. Apply Cream to Mediport 60 minutes prior to infusion. 30 g 5    naloxone (NARCAN) 4 mg/actuation Spry SMARTSIG:Both Nares       ondansetron (ZOFRAN) 8 MG tablet Take 1 tablet (8 mg total) by mouth every 8 (eight) hours as needed for Nausea. 30 tablet 1    ondansetron (ZOFRAN-ODT) 4 MG TbDL Take 1 tablet (4 mg total) by mouth every 8 (eight) hours as needed. 30 tablet 2    oxyCODONE (ROXICODONE) 10 mg Tab immediate release tablet Take 1 tablet (10 mg total) by mouth every 4 (four) hours as needed for Pain. 60 tablet 0    pantoprazole (PROTONIX) 40 MG tablet Take 1 tablet (40 mg total) by mouth once daily. 30 tablet 1    pregabalin (LYRICA) 50 MG capsule pregabalin 50 mg capsule   TAKE 1 CAPSULE BY MOUTH TWICE DAILY      rosuvastatin (CRESTOR) 20 MG tablet Take 20 mg by mouth once daily.      valACYclovir (VALTREX) 500 MG tablet Take 1 tablet (500 mg total) by mouth 2 (two) times daily. 60 tablet 3     No current facility-administered medications for this visit.       Review of patient's allergies indicates:   Allergen Reactions    Pregabalin           ROS  CONSTITUTIONAL: no fevers, no chills,  No weight loss, +  fatigue, no weakness,+ night sweats  HEMATOLOGIC: no abnormal bleeding, no abnormal bruising, no drenching night sweats  ONCOLOGIC: no new masses or lumps  HEENT: no vision loss, no tinnitus or hearing loss, no nose bleeding, no dysphagia, no odynophagia  CVS: no chest pain, no palpitations, no dyspnea on exertion  RESP: no shortness of breath, no hemoptysis, no cough  GI: no nausea, no vomiting, no diarrhea, no constipation, no melena, no hematochezia, no hematemesis, + abdominal pain, no increase in abdominal girth  : no dysuria, no hematuria, no hesitancy, no scrotal swelling, no discharge  INTEGUMENT: no rashes, no abnormal bruising, no nail pitting, no hyperpigmentation  NEURO: no falls, no memory loss, no paresthesias or dysesthesias, no urofecal incontinence or retention, no loss of strength on any extremity  MSK: no back pain, no new joint pain, no joint swelling  PSYCH: no suicidal or homicidal ideation, no depression, no  insomnia, no anhedonia  ENDOCRINE: no heat or cold intolerance, no polyuria, no polydipsia          Physical exam     Vitals:    09/06/24 0921   BP: 121/80   Pulse: 74   Resp: 18   Temp: 98 °F (36.7 °C)       Physical Exam:  GA: AAOx3, NAD  HEENT:  moist oral mucous membranes  RESP:  Equal chest rise, no accessory muscle use  ABDOMEN:  Distended, mild tenderness to palpation, no guarding or rigidity.  EXT: no deformities, no pedal edema  SKIN: no rashes, warm and dry  NEURO: normal mentation, no gross neuro deficits           ASSESSMENT:     # Mantle cell lymphoma, [place]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  Positive for CLL.  PET-CT with evidence of bulky lymphadenopathy, largest measuring 14 cm, spleen measuring 15 cm.  Discussed with patient and his family the diagnosis, natural history and treatment options.  Discussed the risk for TLS given bulky lymphadenopathy, perforation given lymphadenopathy around the bowel loops as well as declining ECOG given his advanced age.  Continuet PJP prophylaxis with Bactrim Monday Wednesday Friday and valacyclovir with treatment.  Continue allopurinol 300 mg q.day for TLS prophylaxis.  Patient is status post cycle 1 of bendamustine and rituximab on 05/18/2022.  Patient has bendamustine was reduced by 25% and rituximab was given 1 week apart to reduce the risk of perforation.  S/p cycle 2 and cycle 3 without significant side effects, dosed for bendamustine increased to 100%.  Discussed with patient interim PET-CT with excellent response to treatment however with residual hypermetabolism in right axillary lymph node with a Deauville score of 4.  Given his excellent tolerance of treatment, will proceed to complete 6 cycles of BR in the setting of above PET-CT report.  Discussed alternate treatment plan to proceed with ibrutinib given partial response.  Patient elected to  proceed with another 2 cycles of BR with plans to repeat PET-CT after completion of treatment to assess treatment response.  Given incomplete response on PET-CT after completion of 6 hours MN, he was started on ibrutinib in second-line initially at 560 mg q.day.    Given patient's thrombocytopenia, dose was reduced to 420 mg in March 2023  PET-CT in April 2023 with excellent response with resolution of axillary adenopathy  PET-CT in August 2023 with no evidence of disease  PET-CT in December 2023 with findings concerning for disease recurrence  Patient was started back on ibrutinib 140 mg q.day in the last week of January 2024.    Patient had multiple interruptions on ibrutinib due to persistent/worsening thrombocytopenia  PET-CT in May 2024 with findings consistent with progression of disease  Discussed with patient and family the plan to initiate 3rd line treatment with Revlimid/rituximab.  Discussed the palliative nature of treatment and incurable nature of disease.  Patient was like to proceed with therapy.  Patient was initiated rituximab Revlimid on 06/03/2024.  Revlimid dose reduced to 10 mg days 1-21 every 28 days  PET-CT in August 2024 with progression of disease.    Discussed with patient the options for further treatment or consideration of hospice given advanced age, and relapsed refractory disease.  After further discussions, patient and his family would like to try further treatment   Discussed the options for treatment with venetoclax monotherapy.  Patient was family agreeable to this plan of care.  Discussed side effect profile with patient.  Will consider transitioning to hospice care/comfort care if patient was not tolerate venetoclax well.     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      Based on available information, likely SLL/CLL with some constitutional symptoms including weight loss, fatigue and night sweats. Adenopathy easily palpable above and below the diaphragm.  Mild thrombocytopenia w/ PLT count ~120k and very mild anemia with Hg ~12 g/dl. No hypercalcemia, no evidence of immune paresis  Biopsies 4/11/22 with Dr Brannon were FNAs and non diagnostic for CLL. I do not know what IHC was obtained or whether pathologist knew what the potential diagnosis was as report only says negative for carcinoma. Will discuss with Dr Lan to add CD5, CD10, CD19, CD20 etc if enough tissue  Regardless, will also refer for axillary LN biopsy, either excisional or at the very least core. Referral sent today  PET CT 4/2022 findings are noted. Given his advanced age he was not refer for EGD. Masses in abdomen are likely matted gurinder conglomerates and I do not think it would be worth it to attempt a biopsy of those  CLL FISH  Negative , IGHV mutation status  Hep B and C negative  Given concomitant diagnosis of CLL and mantle cell lymphoma will plan to initiate treatment for mantle cell lymphoma and CLL with bendamustine rituximab.  Will consider BTK inhibitors if patient is intolerant to bendamustine/rituximab in the second-line      Plan:    Venetoclax ramp up, discussed with specialty pharmacy.  Continue Bactrim and valacyclovir for infectious disease prophylaxis  Will start patient on allopurinol and flucanazole   Will obtain CBC, CMP, LDH and uric acid prior to initiation of treatment.  Plan to follow-up in 1 week, CBC, CMP, LDH, uric acid the day prior.    Continue oxycodone 10 mg q.4-6 hours p.r.n..    Portions of the record may have been created with voice recognition software. Occasional wrong-word or sound-a-like substitutions may have occurred due to the inherent limitations of voice recognition software.

## 2024-09-10 ENCOUNTER — TELEPHONE (OUTPATIENT)
Dept: HEMATOLOGY/ONCOLOGY | Facility: CLINIC | Age: 84
End: 2024-09-10
Payer: MEDICARE

## 2024-09-10 DIAGNOSIS — C83.13 MANTLE CELL LYMPHOMA OF INTRA-ABDOMINAL LYMPH NODES: ICD-10-CM

## 2024-09-10 DIAGNOSIS — G89.3 CANCER RELATED PAIN: Primary | ICD-10-CM

## 2024-09-10 RX ORDER — MORPHINE SULFATE 15 MG/1
15 TABLET, FILM COATED, EXTENDED RELEASE ORAL 2 TIMES DAILY
Qty: 60 TABLET | Refills: 0 | Status: SHIPPED | OUTPATIENT
Start: 2024-09-10

## 2024-09-10 NOTE — TELEPHONE ENCOUNTER
Pt's daughter reports Pt continues to c/o pain and unable to sleep at night. Reports taking Oxycodone every 4 hours without relief. Dr. Brock notified new orders for MS Contin 15mg BID. Pt's daughter notified and requesting Rx sent to [facility] in [place].--SC, LPN

## 2024-09-13 ENCOUNTER — OFFICE VISIT (OUTPATIENT)
Dept: HEMATOLOGY/ONCOLOGY | Facility: CLINIC | Age: 84
End: 2024-09-13
Payer: MEDICARE

## 2024-09-13 VITALS
TEMPERATURE: 94 F | SYSTOLIC BLOOD PRESSURE: 119 MMHG | WEIGHT: 167 LBS | BODY MASS INDEX: 28.51 KG/M2 | RESPIRATION RATE: 14 BRPM | HEIGHT: 64 IN | OXYGEN SATURATION: 96 % | DIASTOLIC BLOOD PRESSURE: 85 MMHG | HEART RATE: 72 BPM

## 2024-09-13 DIAGNOSIS — C83.13 MANTLE CELL LYMPHOMA OF INTRA-ABDOMINAL LYMPH NODES: Primary | ICD-10-CM

## 2024-09-13 DIAGNOSIS — Z51.11 ENCOUNTER FOR ANTINEOPLASTIC CHEMOTHERAPY: ICD-10-CM

## 2024-09-13 RX ORDER — SODIUM CHLORIDE 0.9 % (FLUSH) 0.9 %
10 SYRINGE (ML) INJECTION
OUTPATIENT
Start: 2024-09-13

## 2024-09-13 RX ORDER — PREDNISONE 20 MG/1
60 TABLET ORAL DAILY
Qty: 15 TABLET | Refills: 0 | Status: SHIPPED | OUTPATIENT
Start: 2024-09-13 | End: 2024-09-18

## 2024-09-13 RX ORDER — HEPARIN 100 UNIT/ML
500 SYRINGE INTRAVENOUS
OUTPATIENT
Start: 2024-09-13

## 2024-09-13 NOTE — PROGRESS NOTES
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     Treatment history    05/18/2022-10/26/2022:  6 cycles of bendamustine rituximab  December 2022:  Ibrutinib 560 mg q.day  03/16/2023-07/18/2023:  Ibrutinib 420 mg q.d.  07/19/2023-08/18/2023: Ibrutinib 280 mg q.day  01/22/2023-05/24/2024:  Ibrutinib 140 mg q.day, days 1-21 every 28 days due to persistent thrombocytopenia  06/03/2024-09/06/2024: Revlimid plus rituximab    Current treatment     Plan to initiate venetoclax ramp up on 09/16/2024.      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 He also had a single visit with Dr Snow in 2018 but never followed back.   Patient had a PET-CT as well as a bone marrow biopsy and repeat peripheral flow cytometry with a diagnosis of CLL/SLL and mantle cell lymphoma.  Patient had symptoms of fatigue and weight loss prior to his workup and diagnosis.  He was diagnosed with mantle cell lymphoma and CLL and started on treatment with bendamustine rituximab, with incomplete response following which he was started on ibrutinib.      Interval history     Today, 09/13/2024, patient reports symptoms of worsening abdominal bloating, pain, decreased appetite as well as night sweats.  He reports his pain is controlled on his current pain regimen.  He denies any fevers or chills.  He reports regular bowel movements with a stool softener and laxative.      Labs:      09/13/2024 creatinine 1.4, albumin 3.3, calcium 10.8, total bilirubin 0.5, AST 48, ALT 26, , uric acid 7.9, potassium 4.6, WBC count 3.45, hemoglobin 13.2, MCV 1 1, platelet count 103    09/02/2024 creatinine 1.01, albumin 3.1, LFTs  unremarkable, WBC count 3.19, hemoglobin 13.0, MCV 99.5, platelet count 1 110, ANC 2.49.    08/25/24: Creatinine 1.01, , LFT's wnl, WBC 3.86, hemoglobin 13.8, MCV 1 1.4, platelet count 132 1000, ANC 2.52.    07/28/2024:  CMP unremarkable, WBC count 2.76, hemoglobin 13.2, MCV 98.5, MCV 98.5, platelet count 151, ANC 1.6.    06/23/2024:  Creatinine 0.9, albumin 3.8, calcium 10.0, LFTs unremarkable, uric acid 6.1, , WBC count 2.3, hemoglobin 13.5, MCV 99.3, platelet count 108, ANC 1.41 hepatitis-B total core antibody negative    06/09/2024 creatinine 0.8, albumin 3.6, LFTs unremarkable, LDH 02/08/2028, uric acid 6.9, hepatitis-B surface antigen nonreactive, WBC count 2.97, hemoglobin 13.5, platelet count 22716, ANC 2.31.    05/09/2024 WBC count 3.47, hemoglobin 13.8, platelet count 90750, ANC 2.37  04/11/2024 creatinine 0.96, albumin 3.3, LFTs unremarkable, , TSH 2.7, folic acid 11.4, vitamin B12 479, WBC count 2.7, hemoglobin 13.3, MCV 99, platelet count [id number]  03/14/2024 CMP unremarkable, , creatinine 0.86, WBC count 3.5, hemoglobin 13.7, MCV 99.8, platelet count 43098, ANC 2.18.  02/15/2024:  Creatinine 1.08, albumin 3.5, calcium 9.6, alkaline phosphatase 95, total bilirubin 0.5, AST 40, ALT 49, , WBC count 3.47, hemoglobin 14.2, .7, platelet count 78531, ANC 2.18.  12/18/2023 creatinine 0.85, albumin 3.4, calcium 9.4, LFTs unremarkable, WBC count 3.8, hemoglobin 14.2, .5, platelet count 130, ANC 2.94.  10/09/2023 creatinine 0.8, LFTs unremarkable, uric acid 6.2, , WBC count 3.1, hemoglobin 13.8, .7, platelet count 39176, ANC 2.16.  08/14/2023 CMP unremarkable, , uric acid 6.8, WBC count 4.23, hemoglobin 13.6, , platelet count [id number], ANC 2.87.  06/14/23:  wbc 3.26, hgb 13.9, plt 102, ANC 2.21 Cr 1.14 total bili 0.4 AST 26 ALT 23   05/31/23: wbc 3.69, hgb 12.9, plt 4,000, ANC 2.58  05/26/23: cr 1.11, alb 3.6, ca 9.3, LFTs WNL, , uric  acid 6.7, mag 2.1, phosphorus 2.7  05/03/2023:  Creatinine 0.86, albumin 3.6, calcium 9.3, alkaline phosphatase 61, total bili 0.5, AST 19, ALT 16, magnesium 2.2, phosphorus 3.2, , uric acid 6.5, WBC count 2.6, hemoglobin 12.7, .3, platelet count 124, ANC 1.69.  04/12/2023:  Creatinine 0.83, albumin 3.5, calcium 9.2, , uric acid 3.8, LFTs within normal limits, magnesium 2.0, phosphorus 2.6, WBC count 2.7, hemoglobin 12.6, ANC 2000, ALC 0.28.  03/28/23: CMP unremarkable except ca 10.2, mag 2.2, phosphorus 3.0, , uric acid 4.2, wbc 3.43, hgb 13.3, plt 78, ANC 2.48  03/10/23: CMP unremarkable, wbc 2.57, hgb 12.8, plt 87, ANC 1.85  03/01/23: cr 0.96, ca 9.2, alb 3.4, AST 35, ALT 24, , mag 1.9, phosphorus 3.4, uric acid 3.8, wbc 3.23, hgb 12.5, plt 74,000, ANC 2.39  02/01/23: Cr 0.92, ca 9.6, alb 3.6, LFTs WNL, wbc 3.08, hgb 12.6, plt 102, ANC 2.15, ALC 0.25  01/20/23; cr 0.97, alb 3.5, LFTs WNL, wbc 3.06, hgb 12.8, plt 64, ANC 2.25  11/22/22: cr 0.86, alb 3.7, LFTs WNL, , uric acid 5.1, wbc 2.16, hgb 12.8, plt 76, mcv 107.8, ALC 0.35, ANC 1.11  10/25/2022:  Creatinine 0.89, albumin 3.5, LFTs within normal limits, , uric acid 3.6.  WBC count 3.15, hemoglobin 12.7, .3, platelet count 125 1000, ANC 2.28.  10/11/2022:  Creatinine 1.04, albumin 3.6, LFTs within normal limits, magnesium 2.0, phosphorus 2.7, , uric acid 3.2, WBC 2.4, hemoglobin 13.4, .5, platelet count 21458.  ANC 1.77.  09/13/2022:  Creatinine 0.80, albumin 3.7, LFTs within normal limits, , uric acid 4.0, WBC count 2.37, hemoglobin 13.2, , platelet count 19799, ANC 1.45.  08/16/2022:  Creatinine 0.81, albumin 3.8, calcium 9.2, total bili 0.5, LFTs within normal limits, uric acid 3.3, , WBC 3.45, hemoglobin 13.7, .8, platelet count 16336, ANC 2.06.  07/19/2022:  WBC 3.75, hemoglobin 13.1, .5, platelet count 16757, ANC 2.32, uric acid 4.1, ,  creatinine 1.09, albumin 3.5, calcium 9.6, LFTs within normal limits.  2022 WBC at 2.58 bed neutrophil adequate at 55%.  Hemoglobin 12.7, hematocrit 39.4.  2022:  Creatinine 0.99, calcium 9.1, , uric acid 3.7, phosphorus 3.3, potassium 4.5, WBC count 2.58, hemoglobin 12.7, platelet count 56476, ANC 1.44.  2022:  Potassium 4.9, sodium 140, creatinine 0.87, , uric acid 6.6.  2022:  Creatinine 1.03, albumin 3.3, ,  WBC count 10.32, hemoglobin 12.4, platelet count 135 1000.   2022:  Creatinine 0.93, albumin 3.6, total protein 6.4, , potassium 5.2, WBC count 9.6, hemoglobin 11.4, .4, platelet count 92.   4/ Cr 0.89 Alb 3.4 TP 6.4 Ca 9.4  TSH 2.0 Iron 58n TIBC 285 Ferritin 48 Folate 9.9 B12 426 Hep B s ag neg Uric acid 7.8 Hep C ab neg WBC 9.79 RBC 3.77 Hg 11.8 .5 RDW 13.4 PLT 91 ANC 2.79 ALC 6.36 Direct Ramos neg b2 micro 7.5 Copper 135 Hapto 225 Hep B core ab neg SPEP w/ ARABELLA normal Abs kappa 32 Abs lambda 124 SFLC ratio 0.26  3/18/22 Cr 1 AST 27 ALT 12 AlkPhos 87 Alb 4.1 TP 6.5 Ca 9.8 WBC 9.4 RBC 3.92 Hg 12.2 MCV 96 RDW 15.1  ALC 5.8 ANC 3  5/15/18 WBC 14.71 RBC 4.42 Hg 14.3 MCV 98.6 RDW 13  ANC 2.91 ALC 10.13 AMC 1.29    Imagin2024 PET-CT multiple hypermetabolic lymph nodes above and below the diaphragm in keeping with the known lymphoma.  Indeterminate small nodular densities in both lungs with mild uptake.  Subtle ground-glass in the left lung with mild-to-moderate uptake, which may be inflammatory in etiology, however continued close monitoring is recommended.  No other suspicious uptake.  PET-CT addendum with interval increased size and metabolically activity of multiple lymph nodes above and below the diaphragm.  A prevascular lymph node measures 3 x 2 x 2.3 cm compared to 1.5 x 1.3 with an SUV of 24.1.  The left periaortic lymph node measures 5.3 x 3.9 cm compared to 1.5 x 0.8 with an SUV of 29.1.  The  mass of concurrent metallic lymph node in the right mesentery has an SUV of 29.8 and measures 8.2 x 7.6 x 6.6 cm.    05/20/2024 PET-CT:  Interval progression of lymphoma with increased size and number on metabolically activity of multiple hypermetabolic lymph nodes above and below the diaphragm.  Interval increased size of spleen with diffuse moderate intense uptake also concerning for lymphomatous involvement.  No other suspicious uptake    12/19/2023 PET-CT:  Interval development of mild-to-moderate uptake in multiple small lymph nodes in the chest and abdomen which is somewhat concerning given the history of lymphoma, continued close monitoring is recommended.  Interval development of nonspecific mild diffuse splenic uptake clinical recommendation is recommended.  Interval development of subtle bilateral ground-glass opacities with mild uptake, which is being favored being inflammatory in etiology, attention on follow-up    08/17/2023 PET-CT:  Stable PET-CT without evidence of metabolically active residual or recurrent lymphoma.    04/12/2023:  PET-CT-interval resolution of moderate uptake in right axillary lymph node with no current PET evidence of metabolically active residual or recurrent lymphoma.    11/7/22 PET CT: Stable right axillary lymph node with mild-to-moderate uptake, representing residual metabolically active tumor. No new suspicious uptake noted     08/25/2022 PET-CT:  Excellent response to therapy since her last exam with interval resolution or near complete resolution of multiple hypermetabolic lymph nodes above and below the diaphragm.  There is residual mild-to-moderate uptake in the right axillary lymph node, Deauville score 4  Interval resolution of diffuse uptake in spleen which is also decreased in size.  No new suspicious uptake.     05/10/22:-  right lower extremity Doppler negative for DVT       4/13/22 PET CT: multiple hypermetabolic LNs b/l neck. Right submandibular LN .7x1.7cm, SUV  7.8. Left submandibular LN 2.3x1.7cm SUV 7.2. Multiple hypermetabolic LNs in chest. Right axillary node 3.3x2cm SUV 8.1. Hypermetabolic left axillary LN SUV 6.4, 1.9x3.2cm. Hypermetabolic right paratracheal LN 2.4x1.6cm SUV 4.9. Right pulmonary hilum SUV 2.7. Subcarinal LN SUV 3.3, 1.2x3.3cm. Spleen enlarged at 15cm and demonstrates heterogeneous mild to moderate uptake SUV 5.2. Moderately intense activity in stomach and proximal duodenum with no degree of focality or discrete mass on CT. Multiple hypermetabolic LNs in abd and pelvis. Left periaortic LN SUV 6.8, 4x3.5cm. Large hypermetabolic mesenteric mass [id number] SUV 8.5. Aortocaval LN 3.5x3.8cm SUV 5.1. Hypermetabolic mass in left pelvis 5.5x7cm SUV 7. Left external iliac LN SUV 6 2.2x2.1cm. Extensive hypermetabolism anteriorly and laterally in left abdomen, associated with soft tissue thickening that is difficult to separate from bowel loops. B/l inguinal LNs up to 2.9x3.9cm, SUV 6.6    3/29/22 CT neck w/ and w/o contrast: widespread adenopathy identified in parotic glands bilateral, submandibular space, anterior and posterior cervical chains, superior mediastinum and supraclavicular and infraclavicular spaces of the base of neck/upper chest in appearance strongly suspicious for lymphoma. Multiple subcutaneous masses are also identified in lower occipital scalp and upper neck similar to lymphadenopathy. Biopsy of prominent subcutaneous mass in right upper neck is recommended and should present the easiest location for tissue sampling    Path:  5/23/18 peripheral blood flow cytometry: CD5+ B cell population. No abnormal T cell or myeloid cell populations. CD19+, CD20+, CD5+, CD23+, CD11c+ surface kappa light chain+, CD38-, CD22+ (dim/mod), CD43+, FMC7-. Consistent with CLL     04/26/2022:  Bone marrow biopsy-marrow -trilineage hematopoiesis with 10-20% B-cell infiltrate, C5 positive B-cell population with immunophenotype typical for CLL.  CLL fish within  normal limits, heavy chain mutation status cannot be determined.  6q, chromosome 12, chromosome 13, chromosome 11, chromosome 17 and t( 11;14) abnormalities not detected.     05/06/2022 left axillary lymph node core biopsy:  Mantle cell lymphoma with a subset consistent with CLL/SLL., CD5 positive monoclonal B-cell population is identified, approximately 91% of total cells, immuno phenotype is nonspecific and may be seen in CLL, marginal zone lymphoma, or large cell lymphoma.  A 2nd small monoclonal B-cell population approximately 2% of the cells identified with an immunophenotype consistent with CLL.  New genomic consultation lymphoma cells positive for CD 20, CD 5, cyclin D1 and BCL2 with a TI 67 of 10%.  There is no significant staining for CD10, BCL6.  However workup consistent with diagnosis of mantle cell lymphoma.        Past Medical History:   Diagnosis Date    Cataract     Lymphoma        Past Surgical History:   Procedure Laterality Date    HERNIA REPAIR         Family History   Problem Relation Name Age of Onset    Kidney disease Mother      Colon cancer Father      Colon cancer Sister      Breast cancer Sister      Uterine cancer Sister         Social History     Socioeconomic History    Marital status:    Tobacco Use    Smoking status: Never    Smokeless tobacco: Never   Substance and Sexual Activity    Alcohol use: Not Currently    Drug use: Never       Current Outpatient Medications   Medication Sig Dispense Refill    allopurinoL (ZYLOPRIM) 300 MG tablet Take 1 tablet (300 mg total) by mouth once daily. 30 tablet 5    allopurinoL (ZYLOPRIM) 300 MG tablet Take 1 tablet (300 mg total) by mouth once daily. 30 tablet 3    aspirin (ECOTRIN) 81 MG EC tablet Take 81 mg by mouth once daily.      carvediloL (COREG) 12.5 MG tablet Take 1 tablet by mouth 2 (two) times a day.      EScitalopram oxalate (LEXAPRO) 10 MG tablet Take 1 tablet by mouth once daily.      ezetimibe (ZETIA) 10 mg tablet Take 10 mg  by mouth once daily.      fluconazole (DIFLUCAN) 200 MG Tab Take 2 tablets (400 mg total) by mouth once daily. 30 tablet 0    lenalidomide (REVLIMID) 10 mg Cap Take 10 mg by mouth As instructed TAKE 1 CAPSULE DAYS 1-21 EVERY 28 DAYS.. 21 each 0    LIDOcaine-prilocaine (EMLA) cream Apply topically as needed. Apply Cream to Mediport 60 minutes prior to infusion. 30 g 5    morphine (MS CONTIN) 15 MG 12 hr tablet Take 1 tablet (15 mg total) by mouth 2 (two) times daily. 60 tablet 0    naloxone (NARCAN) 4 mg/actuation Spry SMARTSIG:Both Nares      ondansetron (ZOFRAN) 8 MG tablet Take 1 tablet (8 mg total) by mouth every 8 (eight) hours as needed for Nausea. 30 tablet 1    ondansetron (ZOFRAN-ODT) 4 MG TbDL Take 1 tablet (4 mg total) by mouth every 8 (eight) hours as needed. 30 tablet 2    oxyCODONE (ROXICODONE) 10 mg Tab immediate release tablet Take 1 tablet (10 mg total) by mouth every 4 (four) hours as needed for Pain. 60 tablet 0    pantoprazole (PROTONIX) 40 MG tablet Take 1 tablet (40 mg total) by mouth once daily. 30 tablet 1    pregabalin (LYRICA) 50 MG capsule pregabalin 50 mg capsule   TAKE 1 CAPSULE BY MOUTH TWICE DAILY      rosuvastatin (CRESTOR) 20 MG tablet Take 20 mg by mouth once daily.      venetoclax (VENCLEXTA) 100 mg Tab Week 4: Take 1 tablet (100 mg) by mouth once daily. Take with food.. 7 tablet 0    venetoclax (VENCLEXTA) 100 mg Tab Take 2 tablets (200 mg) by mouth once daily. Take with food.. 56 tablet 0    venetoclax 10 mg Tab Week 1: Take 1 tablet (10 mg) by mouth once daily. Take with food. Week 2: Take 2 tablets (20 mg) by mouth once daily. Take with food. 22 tablet 0    venetoclax 50 mg Tab Week 3: Take 1 tablet (50 mg) by mouth once daily. Take with food. 7 tablet 0    valACYclovir (VALTREX) 500 MG tablet Take 1 tablet (500 mg total) by mouth 2 (two) times daily. 60 tablet 3     No current facility-administered medications for this visit.       Review of patient's allergies indicates:    Allergen Reactions    Pregabalin           ROS  CONSTITUTIONAL: no fevers, no chills,  No weight loss, +  fatigue, no weakness,+ night sweats  HEMATOLOGIC: no abnormal bleeding, no abnormal bruising, no drenching night sweats  ONCOLOGIC: no new masses or lumps  HEENT: no vision loss, no tinnitus or hearing loss, no nose bleeding, no dysphagia, no odynophagia  CVS: no chest pain, no palpitations, no dyspnea on exertion  RESP: no shortness of breath, no hemoptysis, no cough  GI: no nausea, no vomiting, no diarrhea, no constipation, no melena, no hematochezia, no hematemesis, + abdominal pain, no increase in abdominal girth  : no dysuria, no hematuria, no hesitancy, no scrotal swelling, no discharge  INTEGUMENT: no rashes, no abnormal bruising, no nail pitting, no hyperpigmentation  NEURO: no falls, no memory loss, no paresthesias or dysesthesias, no urofecal incontinence or retention, no loss of strength on any extremity  MSK: no back pain, no new joint pain, no joint swelling  PSYCH: no suicidal or homicidal ideation, no depression, no insomnia, no anhedonia  ENDOCRINE: no heat or cold intolerance, no polyuria, no polydipsia          Physical exam     Vitals:    09/13/24 0936   BP: 119/85   Pulse: 72   Resp: 14   Temp: (!) 94.2 °F (34.6 °C)       Physical Exam:  GA: AAOx3, NAD  HEENT:  Dry oral mucous membranes  RESP:  Equal chest rise, no accessory muscle use  ABDOMEN:  Distended, mild tenderness to palpation, no guarding or rigidity.  EXT: no deformities, no pedal edema  SKIN: no rashes, warm and dry  NEURO: normal mentation, no gross neuro deficits           ASSESSMENT:     # Mantle cell lymphoma, [place]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  Positive for CLL.  PET-CT with evidence of bulky lymphadenopathy, largest measuring 14 cm, spleen measuring 15 cm.  Discussed with patient and his family the diagnosis,  natural history and treatment options.  Discussed the risk for TLS given bulky lymphadenopathy, perforation given lymphadenopathy around the bowel loops as well as declining ECOG given his advanced age.  Continuet PJP prophylaxis with Bactrim Monday Wednesday Friday and valacyclovir with treatment.  Continue allopurinol 300 mg q.day for TLS prophylaxis.  Patient is status post cycle 1 of bendamustine and rituximab on 05/18/2022.  Patient has bendamustine was reduced by 25% and rituximab was given 1 week apart to reduce the risk of perforation.  S/p cycle 2 and cycle 3 without significant side effects, dosed for bendamustine increased to 100%.  Discussed with patient interim PET-CT with excellent response to treatment however with residual hypermetabolism in right axillary lymph node with a Deauville score of 4.  Given his excellent tolerance of treatment, will proceed to complete 6 cycles of BR in the setting of above PET-CT report.  Discussed alternate treatment plan to proceed with ibrutinib given partial response.  Patient elected to proceed with another 2 cycles of BR with plans to repeat PET-CT after completion of treatment to assess treatment response.  Given incomplete response on PET-CT after completion of 6 hours OK, he was started on ibrutinib in second-line initially at 560 mg q.day.    Given patient's thrombocytopenia, dose was reduced to 420 mg in March 2023  PET-CT in April 2023 with excellent response with resolution of axillary adenopathy  PET-CT in August 2023 with no evidence of disease  PET-CT in December 2023 with findings concerning for disease recurrence  Patient was started back on ibrutinib 140 mg q.day in the last week of January 2024.    Patient had multiple interruptions on ibrutinib due to persistent/worsening thrombocytopenia  PET-CT in May 2024 with findings consistent with progression of disease  Discussed with patient and family the plan to initiate 3rd line treatment with  Revlimid/rituximab.  Discussed the palliative nature of treatment and incurable nature of disease.  Patient was like to proceed with therapy.  Patient was initiated rituximab Revlimid on 06/03/2024.  Revlimid dose reduced to 10 mg days 1-21 every 28 days  PET-CT in August 2024 with progression of disease.    Discussed with patient the options for further treatment or consideration of hospice given advanced age, and relapsed refractory disease.  After further discussions, patient and his family would like to try further treatment   Discussed the options for treatment with venetoclax monotherapy.  Patient was family agreeable to this plan of care.  Discussed side effect profile with patient.  Will consider transitioning to hospice care/comfort care if patient is not tolerate venetoclax well.     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      Based on available information, likely SLL/CLL with some constitutional symptoms including weight loss, fatigue and night sweats. Adenopathy easily palpable above and below the diaphragm. Mild thrombocytopenia w/ PLT count ~120k and very mild anemia with Hg ~12 g/dl. No hypercalcemia, no evidence of immune paresis  Biopsies 4/11/22 with Dr Brannon were FNAs and non diagnostic for CLL. I do not know what IHC was obtained or whether pathologist knew what the potential diagnosis was as report only says negative for carcinoma. Will discuss with Dr Lan to add CD5, CD10, CD19, CD20 etc if enough tissue  Regardless, will also refer for axillary LN biopsy, either excisional or at the very least core. Referral sent today  PET CT 4/2022 findings are noted. Given his advanced age he was not refer for EGD. Masses in abdomen are likely matted gurinder conglomerates and I do not think it would be worth it to attempt a biopsy of those  CLL FISH  Negative , IGHV mutation status  Hep B and C negative  Given concomitant diagnosis of CLL and mantle cell lymphoma will plan to  initiate treatment for mantle cell lymphoma and CLL with bendamustine rituximab.  Will consider BTK inhibitors if patient is intolerant to bendamustine/rituximab in the second-line      Plan:  1 L of normal saline today for ALTHEA   Plan for repeat blood work on 09/16/2024  Prednisone 60 mg 5 days pending venetoclax delivery  Venetoclax ramp up, discussed with specialty pharmacy.  Continue Bactrim and valacyclovir for infectious disease prophylaxis  Will start patient on allopurinol and flucanazole   Will obtain CBC, CMP, LDH and uric acid prior to initiation of treatment.  Plan to follow-up in 1 week, CBC, CMP, LDH, uric acid the day prior.    Continue oxycodone 10 mg q.4-6 hours p.r.n in addition to MS Contin 15 mg b.i.d.    Portions of the record may have been created with voice recognition software. Occasional wrong-word or sound-a-like substitutions may have occurred due to the inherent limitations of voice recognition software.

## 2024-09-16 ENCOUNTER — TELEPHONE (OUTPATIENT)
Dept: HEMATOLOGY/ONCOLOGY | Facility: CLINIC | Age: 84
End: 2024-09-16
Payer: MEDICARE

## 2024-09-16 NOTE — TELEPHONE ENCOUNTER
Pt's daughter reports Pt went to ED yesterday for hallucinations. Reports Pt shot gun in home and ran around outside in underpants. Also c/o weakness today. Reports Pt having trouble ambulating to restroom d/t weakness.--SC, LPN

## 2024-09-17 RX ORDER — HEPARIN 100 UNIT/ML
500 SYRINGE INTRAVENOUS
OUTPATIENT
Start: 2024-09-21

## 2024-09-17 RX ORDER — SODIUM CHLORIDE 0.9 % (FLUSH) 0.9 %
10 SYRINGE (ML) INJECTION
OUTPATIENT
Start: 2024-09-21

## 2024-09-17 NOTE — PROGRESS NOTES
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     Treatment history    05/18/2022-10/26/2022:  6 cycles of bendamustine rituximab  December 2022:  Ibrutinib 560 mg q.day  03/16/2023-07/18/2023:  Ibrutinib 420 mg q.d.  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 He also had a single visit with Dr Snow in 2018 but never followed back.   Patient had a PET-CT as well as a bone marrow biopsy and repeat peripheral flow cytometry with a diagnosis of CLL/SLL and mantle cell lymphoma.  Patient had symptoms of fatigue and weight loss prior to his workup and diagnosis.  He was diagnosed with mantle cell lymphoma and CLL and started on treatment with bendamustine rituximab, with incomplete response following which he was started on ibrutinib.      Interval history     Today, 09/20/2024, patient reports worsening fatigue, decreased appetite and weight loss.  He reports pain is currently controlled on his current pain regimen however has exacerbation movement.  He denies constipation, nausea or vomiting.  He reports tolerating venetoclax well over the last 4 days.      Labs:    09/19/2024 creatinine 1.4, albumin 2.7, AST 64, ALT 21, uric acid 7.3, WBC count 3.5, hemoglobin 12.5, platelet count 1 1 6, ANC 2.58    09/13/2024 creatinine 1.4, albumin 3.3, calcium 10.8, total bilirubin 0.5, AST 48, ALT 26, , uric acid 7.9,  potassium 4.6, WBC count 3.45, hemoglobin 13.2, MCV 1 1, platelet count 103    09/02/2024 creatinine 1.01, albumin 3.1, LFTs unremarkable, WBC count 3.19, hemoglobin 13.0, MCV 99.5, platelet count 1 110, ANC 2.49.    08/25/24: Creatinine 1.01, , LFT's wnl, WBC 3.86, hemoglobin 13.8, MCV 1 1.4, platelet count 132 1000, ANC 2.52.    07/28/2024:  CMP unremarkable, WBC count 2.76, hemoglobin 13.2, MCV 98.5, MCV 98.5, platelet count 151, ANC 1.6.    06/23/2024:  Creatinine 0.9, albumin 3.8, calcium 10.0, LFTs unremarkable, uric acid 6.1, , WBC count 2.3, hemoglobin 13.5, MCV 99.3, platelet count 108, ANC 1.41 hepatitis-B total core antibody negative    06/09/2024 creatinine 0.8, albumin 3.6, LFTs unremarkable, LDH 02/08/2028, uric acid 6.9, hepatitis-B surface antigen nonreactive, WBC count 2.97, hemoglobin 13.5, platelet count 57548, ANC 2.31.    05/09/2024 WBC count 3.47, hemoglobin 13.8, platelet count 20006, ANC 2.37  04/11/2024 creatinine 0.96, albumin 3.3, LFTs unremarkable, , TSH 2.7, folic acid 11.4, vitamin B12 479, WBC count 2.7, hemoglobin 13.3, MCV 99, platelet count 83686  03/14/2024 CMP unremarkable, , creatinine 0.86, WBC count 3.5, hemoglobin 13.7, MCV 99.8, platelet count 96869, ANC 2.18.  02/15/2024:  Creatinine 1.08, albumin 3.5, calcium 9.6, alkaline phosphatase 95, total bilirubin 0.5, AST 40, ALT 49, , WBC count 3.47, hemoglobin 14.2, .7, platelet count 16502, ANC 2.18.  12/18/2023 creatinine 0.85, albumin 3.4, calcium 9.4, LFTs unremarkable, WBC count 3.8, hemoglobin 14.2, .5, platelet count 130, ANC 2.94.  10/09/2023 creatinine 0.8, LFTs unremarkable, uric acid 6.2, , WBC count 3.1, hemoglobin 13.8, .7, platelet count 90161, ANC 2.16.  08/14/2023 CMP unremarkable, , uric acid 6.8, WBC count 4.23, hemoglobin 13.6, , platelet count 917990, ANC 2.87.  06/14/23:  wbc 3.26, hgb 13.9, plt 102, ANC 2.21 Cr 1.14 total bili 0.4  AST 26 ALT 23   05/31/23: wbc 3.69, hgb 12.9, plt 4,000, ANC 2.58  05/26/23: cr 1.11, alb 3.6, ca 9.3, LFTs WNL, , uric acid 6.7, mag 2.1, phosphorus 2.7  05/03/2023:  Creatinine 0.86, albumin 3.6, calcium 9.3, alkaline phosphatase 61, total bili 0.5, AST 19, ALT 16, magnesium 2.2, phosphorus 3.2, , uric acid 6.5, WBC count 2.6, hemoglobin 12.7, .3, platelet count 124, ANC 1.69.  04/12/2023:  Creatinine 0.83, albumin 3.5, calcium 9.2, , uric acid 3.8, LFTs within normal limits, magnesium 2.0, phosphorus 2.6, WBC count 2.7, hemoglobin 12.6, ANC 2000, ALC 0.28.  03/28/23: CMP unremarkable except ca 10.2, mag 2.2, phosphorus 3.0, , uric acid 4.2, wbc 3.43, hgb 13.3, plt 78, ANC 2.48  03/10/23: CMP unremarkable, wbc 2.57, hgb 12.8, plt 87, ANC 1.85  03/01/23: cr 0.96, ca 9.2, alb 3.4, AST 35, ALT 24, , mag 1.9, phosphorus 3.4, uric acid 3.8, wbc 3.23, hgb 12.5, plt 74,000, ANC 2.39  02/01/23: Cr 0.92, ca 9.6, alb 3.6, LFTs WNL, wbc 3.08, hgb 12.6, plt 102, ANC 2.15, ALC 0.25  01/20/23; cr 0.97, alb 3.5, LFTs WNL, wbc 3.06, hgb 12.8, plt 64, ANC 2.25  11/22/22: cr 0.86, alb 3.7, LFTs WNL, , uric acid 5.1, wbc 2.16, hgb 12.8, plt 76, mcv 107.8, ALC 0.35, ANC 1.11  10/25/2022:  Creatinine 0.89, albumin 3.5, LFTs within normal limits, , uric acid 3.6.  WBC count 3.15, hemoglobin 12.7, .3, platelet count 125 1000, ANC 2.28.  10/11/2022:  Creatinine 1.04, albumin 3.6, LFTs within normal limits, magnesium 2.0, phosphorus 2.7, , uric acid 3.2, WBC 2.4, hemoglobin 13.4, .5, platelet count 21537.  ANC 1.77.  09/13/2022:  Creatinine 0.80, albumin 3.7, LFTs within normal limits, , uric acid 4.0, WBC count 2.37, hemoglobin 13.2, , platelet count 44068, ANC 1.45.  08/16/2022:  Creatinine 0.81, albumin 3.8, calcium 9.2, total bili 0.5, LFTs within normal limits, uric acid 3.3, , WBC 3.45, hemoglobin 13.7, .8, platelet count 27992,  ANC 2.06.  2022:  WBC 3.75, hemoglobin 13.1, .5, platelet count 00213, ANC 2.32, uric acid 4.1, , creatinine 1.09, albumin 3.5, calcium 9.6, LFTs within normal limits.  2022 WBC at 2.58 bed neutrophil adequate at 55%.  Hemoglobin 12.7, hematocrit 39.4.  2022:  Creatinine 0.99, calcium 9.1, , uric acid 3.7, phosphorus 3.3, potassium 4.5, WBC count 2.58, hemoglobin 12.7, platelet count 29494, ANC 1.44.  2022:  Potassium 4.9, sodium 140, creatinine 0.87, , uric acid 6.6.  2022:  Creatinine 1.03, albumin 3.3, ,  WBC count 10.32, hemoglobin 12.4, platelet count 135 1000.   2022:  Creatinine 0.93, albumin 3.6, total protein 6.4, , potassium 5.2, WBC count 9.6, hemoglobin 11.4, .4, platelet count 92.   4/ Cr 0.89 Alb 3.4 TP 6.4 Ca 9.4  TSH 2.0 Iron 58n TIBC 285 Ferritin 48 Folate 9.9 B12 426 Hep B s ag neg Uric acid 7.8 Hep C ab neg WBC 9.79 RBC 3.77 Hg 11.8 .5 RDW 13.4 PLT 91 ANC 2.79 ALC 6.36 Direct Ramos neg b2 micro 7.5 Copper 135 Hapto 225 Hep B core ab neg SPEP w/ ARABELLA normal Abs kappa 32 Abs lambda 124 SFLC ratio 0.26  3/18/22 Cr 1 AST 27 ALT 12 AlkPhos 87 Alb 4.1 TP 6.5 Ca 9.8 WBC 9.4 RBC 3.92 Hg 12.2 MCV 96 RDW 15.1  ALC 5.8 ANC 3  5/15/18 WBC 14.71 RBC 4.42 Hg 14.3 MCV 98.6 RDW 13  ANC 2.91 ALC 10.13 AMC 1.29    Imagin2024 PET-CT multiple hypermetabolic lymph nodes above and below the diaphragm in keeping with the known lymphoma.  Indeterminate small nodular densities in both lungs with mild uptake.  Subtle ground-glass in the left lung with mild-to-moderate uptake, which may be inflammatory in etiology, however continued close monitoring is recommended.  No other suspicious uptake.  PET-CT addendum with interval increased size and metabolically activity of multiple lymph nodes above and below the diaphragm.  A prevascular lymph node measures 3 x 2 x 2.3 cm compared to 1.5 x 1.3 with  an SUV of 24.1.  The left periaortic lymph node measures 5.3 x 3.9 cm compared to 1.5 x 0.8 with an SUV of 29.1.  The mass of concurrent metallic lymph node in the right mesentery has an SUV of 29.8 and measures 8.2 x 7.6 x 6.6 cm.    05/20/2024 PET-CT:  Interval progression of lymphoma with increased size and number on metabolically activity of multiple hypermetabolic lymph nodes above and below the diaphragm.  Interval increased size of spleen with diffuse moderate intense uptake also concerning for lymphomatous involvement.  No other suspicious uptake    12/19/2023 PET-CT:  Interval development of mild-to-moderate uptake in multiple small lymph nodes in the chest and abdomen which is somewhat concerning given the history of lymphoma, continued close monitoring is recommended.  Interval development of nonspecific mild diffuse splenic uptake clinical recommendation is recommended.  Interval development of subtle bilateral ground-glass opacities with mild uptake, which is being favored being inflammatory in etiology, attention on follow-up    08/17/2023 PET-CT:  Stable PET-CT without evidence of metabolically active residual or recurrent lymphoma.    04/12/2023:  PET-CT-interval resolution of moderate uptake in right axillary lymph node with no current PET evidence of metabolically active residual or recurrent lymphoma.    11/7/22 PET CT: Stable right axillary lymph node with mild-to-moderate uptake, representing residual metabolically active tumor. No new suspicious uptake noted     08/25/2022 PET-CT:  Excellent response to therapy since her last exam with interval resolution or near complete resolution of multiple hypermetabolic lymph nodes above and below the diaphragm.  There is residual mild-to-moderate uptake in the right axillary lymph node, Deauville score 4  Interval resolution of diffuse uptake in spleen which is also decreased in size.  No new suspicious uptake.     05/10/22:-  right lower extremity  Doppler negative for DVT       4/13/22 PET CT: multiple hypermetabolic LNs b/l neck. Right submandibular LN .7x1.7cm, SUV 7.8. Left submandibular LN 2.3x1.7cm SUV 7.2. Multiple hypermetabolic LNs in chest. Right axillary node 3.3x2cm SUV 8.1. Hypermetabolic left axillary LN SUV 6.4, 1.9x3.2cm. Hypermetabolic right paratracheal LN 2.4x1.6cm SUV 4.9. Right pulmonary hilum SUV 2.7. Subcarinal LN SUV 3.3, 1.2x3.3cm. Spleen enlarged at 15cm and demonstrates heterogeneous mild to moderate uptake SUV 5.2. Moderately intense activity in stomach and proximal duodenum with no degree of focality or discrete mass on CT. Multiple hypermetabolic LNs in abd and pelvis. Left periaortic LN SUV 6.8, 4x3.5cm. Large hypermetabolic mesenteric mass [id number] SUV 8.5. Aortocaval LN 3.5x3.8cm SUV 5.1. Hypermetabolic mass in left pelvis 5.5x7cm SUV 7. Left external iliac LN SUV 6 2.2x2.1cm. Extensive hypermetabolism anteriorly and laterally in left abdomen, associated with soft tissue thickening that is difficult to separate from bowel loops. B/l inguinal LNs up to 2.9x3.9cm, SUV 6.6    3/29/22 CT neck w/ and w/o contrast: widespread adenopathy identified in parotic glands bilateral, submandibular space, anterior and posterior cervical chains, superior mediastinum and supraclavicular and infraclavicular spaces of the base of neck/upper chest in appearance strongly suspicious for lymphoma. Multiple subcutaneous masses are also identified in lower occipital scalp and upper neck similar to lymphadenopathy. Biopsy of prominent subcutaneous mass in right upper neck is recommended and should present the easiest location for tissue sampling    Path:  5/23/18 peripheral blood flow cytometry: CD5+ B cell population. No abnormal T cell or myeloid cell populations. CD19+, CD20+, CD5+, CD23+, CD11c+ surface kappa light chain+, CD38-, CD22+ (dim/mod), CD43+, FMC7-. Consistent with CLL     04/26/2022:  Bone marrow biopsy-marrow -trilineage  hematopoiesis with 10-20% B-cell infiltrate, C5 positive B-cell population with immunophenotype typical for CLL.  CLL fish within normal limits, heavy chain mutation status cannot be determined.  6q, chromosome 12, chromosome 13, chromosome 11, chromosome 17 and t( 11;14) abnormalities not detected.     05/06/2022 left axillary lymph node core biopsy:  Mantle cell lymphoma with a subset consistent with CLL/SLL., CD5 positive monoclonal B-cell population is identified, approximately 91% of total cells, immuno phenotype is nonspecific and may be seen in CLL, marginal zone lymphoma, or large cell lymphoma.  A 2nd small monoclonal B-cell population approximately 2% of the cells identified with an immunophenotype consistent with CLL.  New genomic consultation lymphoma cells positive for CD 20, CD 5, cyclin D1 and BCL2 with a TI 67 of 10%.  There is no significant staining for CD10, BCL6.  However workup consistent with diagnosis of mantle cell lymphoma.        Past Medical History:   Diagnosis Date    Cataract     Lymphoma        Past Surgical History:   Procedure Laterality Date    HERNIA REPAIR         Family History   Problem Relation Name Age of Onset    Kidney disease Mother      Colon cancer Father      Colon cancer Sister      Breast cancer Sister      Uterine cancer Sister         Social History     Socioeconomic History    Marital status:    Tobacco Use    Smoking status: Never    Smokeless tobacco: Never   Substance and Sexual Activity    Alcohol use: Not Currently    Drug use: Never       Current Outpatient Medications   Medication Sig Dispense Refill    allopurinoL (ZYLOPRIM) 300 MG tablet Take 1 tablet (300 mg total) by mouth once daily. 30 tablet 5    allopurinoL (ZYLOPRIM) 300 MG tablet Take 1 tablet (300 mg total) by mouth once daily. 30 tablet 3    aspirin (ECOTRIN) 81 MG EC tablet Take 81 mg by mouth once daily.      carvediloL (COREG) 12.5 MG tablet Take 1 tablet by mouth 2 (two) times a day.       EScitalopram oxalate (LEXAPRO) 10 MG tablet Take 1 tablet by mouth once daily.      ezetimibe (ZETIA) 10 mg tablet Take 10 mg by mouth once daily.      fluconazole (DIFLUCAN) 200 MG Tab Take 2 tablets (400 mg total) by mouth once daily. 30 tablet 0    lenalidomide (REVLIMID) 10 mg Cap Take 10 mg by mouth As instructed TAKE 1 CAPSULE DAYS 1-21 EVERY 28 DAYS.. 21 each 0    LIDOcaine-prilocaine (EMLA) cream Apply topically as needed. Apply Cream to Mediport 60 minutes prior to infusion. 30 g 5    naloxone (NARCAN) 4 mg/actuation Spry SMARTSIG:Both Nares      ondansetron (ZOFRAN) 8 MG tablet Take 1 tablet (8 mg total) by mouth every 8 (eight) hours as needed for Nausea. 30 tablet 1    ondansetron (ZOFRAN-ODT) 4 MG TbDL Take 1 tablet (4 mg total) by mouth every 8 (eight) hours as needed. 30 tablet 2    oxyCODONE (ROXICODONE) 10 mg Tab immediate release tablet Take 1 tablet (10 mg total) by mouth every 4 (four) hours as needed for Pain. 60 tablet 0    pregabalin (LYRICA) 50 MG capsule pregabalin 50 mg capsule   TAKE 1 CAPSULE BY MOUTH TWICE DAILY      rosuvastatin (CRESTOR) 20 MG tablet Take 20 mg by mouth once daily.      venetoclax (VENCLEXTA) 100 mg Tab Week 4: Take 1 tablet (100 mg) by mouth once daily. Take with food.. 7 tablet 0    venetoclax (VENCLEXTA) 100 mg Tab Take 2 tablets (200 mg) by mouth once daily. Take with food.. 56 tablet 0    venetoclax 10 mg Tab Week 1: Take 1 tablet (10 mg) by mouth once daily. Take with food. Week 2: Take 2 tablets (20 mg) by mouth once daily. Take with food. 22 tablet 0    venetoclax 50 mg Tab Week 3: Take 1 tablet (50 mg) by mouth once daily. Take with food. 7 tablet 0    morphine (MS CONTIN) 15 MG 12 hr tablet Take 1 tablet (15 mg total) by mouth 2 (two) times daily. (Patient not taking: Reported on 9/20/2024) 60 tablet 0    pantoprazole (PROTONIX) 40 MG tablet Take 1 tablet (40 mg total) by mouth once daily. 30 tablet 1    sulfamethoxazole-trimethoprim 800-160mg (BACTRIM DS)  800-160 mg Tab Take 1 tablet by mouth every Mon, Wed, Fri. 30 tablet 0    valACYclovir (VALTREX) 500 MG tablet Take 1 tablet (500 mg total) by mouth 2 (two) times daily. 60 tablet 3     No current facility-administered medications for this visit.       Review of patient's allergies indicates:   Allergen Reactions    Pregabalin           ROS  CONSTITUTIONAL: no fevers, no chills,  No weight loss, +  fatigue, no weakness,+ night sweats  HEMATOLOGIC: no abnormal bleeding, no abnormal bruising, no drenching night sweats  ONCOLOGIC: no new masses or lumps  HEENT: no vision loss, no tinnitus or hearing loss, no nose bleeding, no dysphagia, no odynophagia  CVS: no chest pain, no palpitations, no dyspnea on exertion  RESP: no shortness of breath, no hemoptysis, no cough  GI: no nausea, no vomiting, no diarrhea, no constipation, no melena, no hematochezia, no hematemesis, + abdominal pain, no increase in abdominal girth  : no dysuria, no hematuria, no hesitancy, no scrotal swelling, no discharge  INTEGUMENT: no rashes, no abnormal bruising, no nail pitting, no hyperpigmentation  NEURO: no falls, no memory loss, no paresthesias or dysesthesias, no urofecal incontinence or retention, no loss of strength on any extremity  MSK: no back pain, no new joint pain, no joint swelling  PSYCH: no suicidal or homicidal ideation, no depression, no insomnia, no anhedonia  ENDOCRINE: no heat or cold intolerance, no polyuria, no polydipsia          Physical exam     Vitals:    09/20/24 1005   BP: 94/62   Pulse: 89   Resp: 18   Temp: 97.8 °F (36.6 °C)         Physical Exam:  GA: AAOx3, NAD  HEENT:  Dry oral mucous membranes  RESP:  Equal chest rise, no accessory muscle use  ABDOMEN:  Distended, mild tenderness to palpation, no guarding or rigidity.  EXT: no deformities, no pedal edema  SKIN: no rashes, warm and dry  NEURO: normal mentation, no gross neuro deficits           ASSESSMENT:     # Mantle cell lymphoma, [place] stage III or IV  based on PET/CT  Ki 67 10%, MIPI 6.7, high risk, SOX11+  Bone marrow biopsy negative for evidence of mantle cell lymphoma.  Positive for CLL.  PET-CT with evidence of bulky lymphadenopathy, largest measuring 14 cm, spleen measuring 15 cm.  Discussed with patient and his family the diagnosis, natural history and treatment options.  Discussed the risk for TLS given bulky lymphadenopathy, perforation given lymphadenopathy around the bowel loops as well as declining ECOG given his advanced age.  Continuet PJP prophylaxis with Bactrim Monday Wednesday Friday and valacyclovir with treatment.  Continue allopurinol 300 mg q.day for TLS prophylaxis.  Patient is status post cycle 1 of bendamustine and rituximab on 05/18/2022.  Patient has bendamustine was reduced by 25% and rituximab was given 1 week apart to reduce the risk of perforation.  S/p cycle 2 and cycle 3 without significant side effects, dosed for bendamustine increased to 100%.  Discussed with patient interim PET-CT with excellent response to treatment however with residual hypermetabolism in right axillary lymph node with a Deauville score of 4.  Given his excellent tolerance of treatment, will proceed to complete 6 cycles of BR in the setting of above PET-CT report.  Discussed alternate treatment plan to proceed with ibrutinib given partial response.  Patient elected to proceed with another 2 cycles of BR with plans to repeat PET-CT after completion of treatment to assess treatment response.  Given incomplete response on PET-CT after completion of 6 hours VA, he was started on ibrutinib in second-line initially at 560 mg q.day.    Given patient's thrombocytopenia, dose was reduced to 420 mg in March 2023  PET-CT in April 2023 with excellent response with resolution of axillary adenopathy  PET-CT in August 2023 with no evidence of disease  PET-CT in December 2023 with findings concerning for disease recurrence  Patient was started back on ibrutinib 140 mg q.day  in the last week of January 2024.    Patient had multiple interruptions on ibrutinib due to persistent/worsening thrombocytopenia  PET-CT in May 2024 with findings consistent with progression of disease  Discussed with patient and family the plan to initiate 3rd line treatment with Revlimid/rituximab.  Discussed the palliative nature of treatment and incurable nature of disease.  Patient was like to proceed with therapy.  Patient was initiated rituximab Revlimid on 06/03/2024.  Revlimid dose reduced to 10 mg days 1-21 every 28 days  PET-CT in August 2024 with progression of disease.    Discussed with patient the options for further treatment or consideration of hospice given advanced age, and relapsed refractory disease.  After further discussions, patient and his family would like to try further treatment   Discussed the options for treatment with venetoclax monotherapy.  Patient was family agreeable to this plan of care.  Discussed side effect profile with patient.  Will consider transitioning to hospice care/comfort care if patient is not tolerate venetoclax well.     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      Based on available information, likely SLL/CLL with some constitutional symptoms including weight loss, fatigue and night sweats. Adenopathy easily palpable above and below the diaphragm. Mild thrombocytopenia w/ PLT count ~120k and very mild anemia with Hg ~12 g/dl. No hypercalcemia, no evidence of immune paresis  Biopsies 4/11/22 with Dr Brannon were FNAs and non diagnostic for CLL. I do not know what IHC was obtained or whether pathologist knew what the potential diagnosis was as report only says negative for carcinoma. Will discuss with Dr Lan to add CD5, CD10, CD19, CD20 etc if enough tissue  Regardless, will also refer for axillary LN biopsy, either excisional or at the very least core. Referral sent today  PET CT 4/2022 findings are noted. Given his advanced age he was  not refer for EGD. Masses in abdomen are likely matted gurinder conglomerates and I do not think it would be worth it to attempt a biopsy of those  CLL FISH  Negative , IGHV mutation status  Hep B and C negative  Given concomitant diagnosis of CLL and mantle cell lymphoma will plan to initiate treatment for mantle cell lymphoma and CLL with bendamustine rituximab.  Will consider BTK inhibitors if patient is intolerant to bendamustine/rituximab in the second-line      Plan:  1 L of normal saline today for ALTHEA   Continue venetoclax ramp up  Continue Bactrim and valacyclovir for infectious disease prophylaxis  Continue allopurinol and flucanazole   Plan to follow-up virtually in 1 week, CBC, CMP, LDH, uric acid the day prior.    Continue oxycodone 10 mg q.4-6 hours p.r.n  MS Contin discontinued due to increased confusion/hallucination          Portions of the record may have been created with voice recognition software. Occasional wrong-word or sound-a-like substitutions may have occurred due to the inherent limitations of voice recognition software.

## 2024-09-17 NOTE — TELEPHONE ENCOUNTER
Marsha with [facility] notified of new lab orders and requesting labs be brought to [facility] for results. Lab orders faxed.--SC, ALIRION

## 2024-09-17 NOTE — TELEPHONE ENCOUNTER
Pt's daughter notified per Dr. Brock to start Venetoclax. Also notified of new orders for IV fluids and lab orders CBC, CMP, LDH, Uric Acid for Thursday. Radha with [facility] notified of orders for IV Fluids.--SC, LPN

## 2024-09-19 ENCOUNTER — TELEPHONE (OUTPATIENT)
Dept: HEMATOLOGY/ONCOLOGY | Facility: CLINIC | Age: 84
End: 2024-09-19
Payer: MEDICARE

## 2024-09-19 NOTE — TELEPHONE ENCOUNTER
Pt's daughter report Pt is having hallucinations when taking Morphine and unable to sleep at night. Please advise.--SC, LPN

## 2024-09-20 ENCOUNTER — OFFICE VISIT (OUTPATIENT)
Dept: HEMATOLOGY/ONCOLOGY | Facility: CLINIC | Age: 84
End: 2024-09-20
Payer: MEDICARE

## 2024-09-20 VITALS
OXYGEN SATURATION: 95 % | BODY MASS INDEX: 27.26 KG/M2 | TEMPERATURE: 98 F | SYSTOLIC BLOOD PRESSURE: 94 MMHG | DIASTOLIC BLOOD PRESSURE: 62 MMHG | RESPIRATION RATE: 18 BRPM | HEART RATE: 89 BPM | WEIGHT: 159.69 LBS | HEIGHT: 64 IN

## 2024-09-20 DIAGNOSIS — C83.13 MANTLE CELL LYMPHOMA OF INTRA-ABDOMINAL LYMPH NODES: Primary | ICD-10-CM

## 2024-09-20 DIAGNOSIS — G89.3 CANCER RELATED PAIN: ICD-10-CM

## 2024-09-20 DIAGNOSIS — K21.9 GASTROESOPHAGEAL REFLUX DISEASE, UNSPECIFIED WHETHER ESOPHAGITIS PRESENT: ICD-10-CM

## 2024-09-20 DIAGNOSIS — Z51.11 ENCOUNTER FOR ANTINEOPLASTIC CHEMOTHERAPY: ICD-10-CM

## 2024-09-20 RX ORDER — SODIUM CHLORIDE 0.9 % (FLUSH) 0.9 %
10 SYRINGE (ML) INJECTION
OUTPATIENT
Start: 2024-09-22

## 2024-09-20 RX ORDER — OXYCODONE HYDROCHLORIDE 10 MG/1
10 TABLET ORAL EVERY 4 HOURS PRN
Qty: 60 TABLET | Refills: 0 | Status: SHIPPED | OUTPATIENT
Start: 2024-09-20

## 2024-09-20 RX ORDER — PANTOPRAZOLE SODIUM 40 MG/1
40 TABLET, DELAYED RELEASE ORAL DAILY
Qty: 30 TABLET | Refills: 1 | Status: SHIPPED | OUTPATIENT
Start: 2024-09-20 | End: 2025-09-20

## 2024-09-20 RX ORDER — SULFAMETHOXAZOLE AND TRIMETHOPRIM 800; 160 MG/1; MG/1
1 TABLET ORAL
Qty: 30 TABLET | Refills: 0 | Status: SHIPPED | OUTPATIENT
Start: 2024-09-20

## 2024-09-20 RX ORDER — HEPARIN 100 UNIT/ML
500 SYRINGE INTRAVENOUS
OUTPATIENT
Start: 2024-09-22

## 2024-09-23 ENCOUNTER — TELEPHONE (OUTPATIENT)
Dept: HEMATOLOGY/ONCOLOGY | Facility: CLINIC | Age: 84
End: 2024-09-23
Payer: MEDICARE

## 2024-09-23 NOTE — TELEPHONE ENCOUNTER
PT's daughter reports [place] [facility] [place] this weekend and was brought to ED. Records printed and scanned in EPIC.--SC, LPN

## 2024-09-23 NOTE — TELEPHONE ENCOUNTER
Pt's daughter reports Pt is discharged from [facility]. States Venetoclax is not on discharge medications. Does Pt start medication? Requesting medication to help Pt sleep. Discharge summary scanned in Epic. Please advise.--SC, LPN

## 2024-09-25 ENCOUNTER — TELEPHONE (OUTPATIENT)
Dept: HEMATOLOGY/ONCOLOGY | Facility: CLINIC | Age: 84
End: 2024-09-25
Payer: MEDICARE

## 2024-09-25 NOTE — TELEPHONE ENCOUNTER
Pt's daughter reports diarrhea x 3 episodes which started yesterday prior to taking Venetoclax. Reports incontinence during sleep and this morning. Reports stool being a little more formed this morning than last night. Denies fever or abdominal cramping.--SC, LPN

## 2024-09-27 ENCOUNTER — OFFICE VISIT (OUTPATIENT)
Dept: HEMATOLOGY/ONCOLOGY | Facility: CLINIC | Age: 84
End: 2024-09-27
Payer: MEDICARE

## 2024-09-27 DIAGNOSIS — C83.13 MANTLE CELL LYMPHOMA OF INTRA-ABDOMINAL LYMPH NODES: Primary | ICD-10-CM

## 2024-09-27 DIAGNOSIS — Z71.89 GOALS OF CARE, COUNSELING/DISCUSSION: ICD-10-CM

## 2024-09-27 NOTE — PROGRESS NOTES
Established Patient - Audio Only Telehealth Visit     The patient location is:  Home  The chief complaint leading to consultation is:  Follow-up  Visit type: Virtual visit with audio only (telephone)  Total time spent with patient:  18 minutes       The reason for the audio only service rather than synchronous audio and video virtual visit was related to technical difficulties or patient preference/necessity.     Each patient to whom I provide medical services by telemedicine is:  (1) informed of the relationship between the physician and patient and the respective role of any other health care provider with respect to management of the patient; and (2) notified that they may decline to receive medical services by telemedicine and may withdraw from such care at any time. Patient verbally consented to receive this service via voice-only telephone call.       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      Treatment history     05/18/2022-10/26/2022:  6 cycles of bendamustine rituximab  December 2022:  Ibrutinib 560 mg q.day  03/16/2023-07/18/2023:  Ibrutinib 420 mg q.d.  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 He also had a single visit with Dr Snow in 2018 but never followed back.   Patient had a PET-CT as well as a bone marrow biopsy and repeat peripheral flow cytometry with a diagnosis of  CLL/SLL and mantle cell lymphoma.  Patient had symptoms of fatigue and weight loss prior to his workup and diagnosis.  He was diagnosed with mantle cell lymphoma and CLL and started on treatment with bendamustine rituximab, with incomplete response following which he was started on ibrutinib.       Interval history     Today, 09/27/2024, patient reports persistent abdominal swelling, discomfort, decrease in appetite and severe fatigue as well as inability to sleep.  He was stopped taking his pain medications with fear of hallucinations.  He spends most of PET days in his bed or on the couch.  He has a few episodes of diarrhea 2 days back which was controlled with Imodium.       Labs:     09/19/2024 creatinine 1.4, albumin 2.7, AST 64, ALT 21, uric acid 7.3, WBC count 3.5, hemoglobin 12.5, platelet count 1 1 6, ANC 2.58     09/13/2024 creatinine 1.4, albumin 3.3, calcium 10.8, total bilirubin 0.5, AST 48, ALT 26, , uric acid 7.9, potassium 4.6, WBC count 3.45, hemoglobin 13.2, MCV 1 1, platelet count 103     09/02/2024 creatinine 1.01, albumin 3.1, LFTs unremarkable, WBC count 3.19, hemoglobin 13.0, MCV 99.5, platelet count 1 110, ANC 2.49.     08/25/24: Creatinine 1.01, , LFT's wnl, WBC 3.86, hemoglobin 13.8, MCV 1 1.4, platelet count 132 1000, ANC 2.52.     07/28/2024:  CMP unremarkable, WBC count 2.76, hemoglobin 13.2, MCV 98.5, MCV 98.5, platelet count 151, ANC 1.6.     06/23/2024:  Creatinine 0.9, albumin 3.8, calcium 10.0, LFTs unremarkable, uric acid 6.1, , WBC count 2.3, hemoglobin 13.5, MCV 99.3, platelet count 108, ANC 1.41 hepatitis-B total core antibody negative     06/09/2024 creatinine 0.8, albumin 3.6, LFTs unremarkable, LDH 02/08/2028, uric acid 6.9, hepatitis-B surface antigen nonreactive, WBC count 2.97, hemoglobin 13.5, platelet count 23501, ANC 2.31.     05/09/2024 WBC count 3.47, hemoglobin 13.8, platelet count 36164, ANC 2.37  04/11/2024 creatinine 0.96, albumin 3.3, LFTs  unremarkable, , TSH 2.7, folic acid 11.4, vitamin B12 479, WBC count 2.7, hemoglobin 13.3, MCV 99, platelet count 91423  03/14/2024 CMP unremarkable, , creatinine 0.86, WBC count 3.5, hemoglobin 13.7, MCV 99.8, platelet count 14544, ANC 2.18.  02/15/2024:  Creatinine 1.08, albumin 3.5, calcium 9.6, alkaline phosphatase 95, total bilirubin 0.5, AST 40, ALT 49, , WBC count 3.47, hemoglobin 14.2, .7, platelet count 75557, ANC 2.18.  12/18/2023 creatinine 0.85, albumin 3.4, calcium 9.4, LFTs unremarkable, WBC count 3.8, hemoglobin 14.2, .5, platelet count 130, ANC 2.94.  10/09/2023 creatinine 0.8, LFTs unremarkable, uric acid 6.2, , WBC count 3.1, hemoglobin 13.8, .7, platelet count 61118, ANC 2.16.  08/14/2023 CMP unremarkable, , uric acid 6.8, WBC count 4.23, hemoglobin 13.6, , platelet count 534631, ANC 2.87.  06/14/23:  wbc 3.26, hgb 13.9, plt 102, ANC 2.21 Cr 1.14 total bili 0.4 AST 26 ALT 23   05/31/23: wbc 3.69, hgb 12.9, plt 4,000, ANC 2.58  05/26/23: cr 1.11, alb 3.6, ca 9.3, LFTs WNL, , uric acid 6.7, mag 2.1, phosphorus 2.7  05/03/2023:  Creatinine 0.86, albumin 3.6, calcium 9.3, alkaline phosphatase 61, total bili 0.5, AST 19, ALT 16, magnesium 2.2, phosphorus 3.2, , uric acid 6.5, WBC count 2.6, hemoglobin 12.7, .3, platelet count 124, ANC 1.69.  04/12/2023:  Creatinine 0.83, albumin 3.5, calcium 9.2, , uric acid 3.8, LFTs within normal limits, magnesium 2.0, phosphorus 2.6, WBC count 2.7, hemoglobin 12.6, ANC 2000, ALC 0.28.  03/28/23: CMP unremarkable except ca 10.2, mag 2.2, phosphorus 3.0, , uric acid 4.2, wbc 3.43, hgb 13.3, plt 78, ANC 2.48  03/10/23: CMP unremarkable, wbc 2.57, hgb 12.8, plt 87, ANC 1.85  03/01/23: cr 0.96, ca 9.2, alb 3.4, AST 35, ALT 24, , mag 1.9, phosphorus 3.4, uric acid 3.8, wbc 3.23, hgb 12.5, plt 74,000, ANC 2.39  02/01/23: Cr 0.92, ca 9.6, alb 3.6, LFTs WNL, wbc 3.08, hgb  12.6, plt 102, ANC 2.15, ALC 0.25  01/20/23; cr 0.97, alb 3.5, LFTs WNL, wbc 3.06, hgb 12.8, plt 64, ANC 2.25  11/22/22: cr 0.86, alb 3.7, LFTs WNL, , uric acid 5.1, wbc 2.16, hgb 12.8, plt 76, mcv 107.8, ALC 0.35, ANC 1.11  10/25/2022:  Creatinine 0.89, albumin 3.5, LFTs within normal limits, , uric acid 3.6.  WBC count 3.15, hemoglobin 12.7, .3, platelet count 125 1000, ANC 2.28.  10/11/2022:  Creatinine 1.04, albumin 3.6, LFTs within normal limits, magnesium 2.0, phosphorus 2.7, , uric acid 3.2, WBC 2.4, hemoglobin 13.4, .5, platelet count 88931.  ANC 1.77.  09/13/2022:  Creatinine 0.80, albumin 3.7, LFTs within normal limits, , uric acid 4.0, WBC count 2.37, hemoglobin 13.2, , platelet count 37296, ANC 1.45.  08/16/2022:  Creatinine 0.81, albumin 3.8, calcium 9.2, total bili 0.5, LFTs within normal limits, uric acid 3.3, , WBC 3.45, hemoglobin 13.7, .8, platelet count 18239, ANC 2.06.  07/19/2022:  WBC 3.75, hemoglobin 13.1, .5, platelet count 07184, ANC 2.32, uric acid 4.1, , creatinine 1.09, albumin 3.5, calcium 9.6, LFTs within normal limits.  06/21/2022 WBC at 2.58 bed neutrophil adequate at 55%.  Hemoglobin 12.7, hematocrit 39.4.  06/14/2022:  Creatinine 0.99, calcium 9.1, , uric acid 3.7, phosphorus 3.3, potassium 4.5, WBC count 2.58, hemoglobin 12.7, platelet count 28547, ANC 1.44.  05/27/2022:  Potassium 4.9, sodium 140, creatinine 0.87, , uric acid 6.6.  05/17/2022:  Creatinine 1.03, albumin 3.3, ,  WBC count 10.32, hemoglobin 12.4, platelet count 135 1000.   05/02/2022:  Creatinine 0.93, albumin 3.6, total protein 6.4, , potassium 5.2, WBC count 9.6, hemoglobin 11.4, .4, platelet count 92.   4/12/22 Cr 0.89 Alb 3.4 TP 6.4 Ca 9.4  TSH 2.0 Iron 58n TIBC 285 Ferritin 48 Folate 9.9 B12 426 Hep B s ag neg Uric acid 7.8 Hep C ab neg WBC 9.79 RBC 3.77 Hg 11.8 .5 RDW 13.4 PLT 91 ANC  2.79 ALC 6.36 Direct Ramos neg b2 micro 7.5 Copper 135 Hapto 225 Hep B core ab neg SPEP w/ ARABELLA normal Abs kappa 32 Abs lambda 124 SFLC ratio 0.26  3/18/22 Cr 1 AST 27 ALT 12 AlkPhos 87 Alb 4.1 TP 6.5 Ca 9.8 WBC 9.4 RBC 3.92 Hg 12.2 MCV 96 RDW 15.1  ALC 5.8 ANC 3  5/15/18 WBC 14.71 RBC 4.42 Hg 14.3 MCV 98.6 RDW 13  ANC 2.91 ALC 10.13 AMC 1.29    Imagin2024 PET-CT multiple hypermetabolic lymph nodes above and below the diaphragm in keeping with the known lymphoma.  Indeterminate small nodular densities in both lungs with mild uptake.  Subtle ground-glass in the left lung with mild-to-moderate uptake, which may be inflammatory in etiology, however continued close monitoring is recommended.  No other suspicious uptake.  PET-CT addendum with interval increased size and metabolically activity of multiple lymph nodes above and below the diaphragm.  A prevascular lymph node measures 3 x 2 x 2.3 cm compared to 1.5 x 1.3 with an SUV of 24.1.  The left periaortic lymph node measures 5.3 x 3.9 cm compared to 1.5 x 0.8 with an SUV of 29.1.  The mass of concurrent metallic lymph node in the right mesentery has an SUV of 29.8 and measures 8.2 x 7.6 x 6.6 cm.     2024 PET-CT:  Interval progression of lymphoma with increased size and number on metabolically activity of multiple hypermetabolic lymph nodes above and below the diaphragm.  Interval increased size of spleen with diffuse moderate intense uptake also concerning for lymphomatous involvement.  No other suspicious uptake     2023 PET-CT:  Interval development of mild-to-moderate uptake in multiple small lymph nodes in the chest and abdomen which is somewhat concerning given the history of lymphoma, continued close monitoring is recommended.  Interval development of nonspecific mild diffuse splenic uptake clinical recommendation is recommended.  Interval development of subtle bilateral ground-glass opacities with mild uptake, which is  being favored being inflammatory in etiology, attention on follow-up     08/17/2023 PET-CT:  Stable PET-CT without evidence of metabolically active residual or recurrent lymphoma.     04/12/2023:  PET-CT-interval resolution of moderate uptake in right axillary lymph node with no current PET evidence of metabolically active residual or recurrent lymphoma.     11/7/22 PET CT: Stable right axillary lymph node with mild-to-moderate uptake, representing residual metabolically active tumor. No new suspicious uptake noted     08/25/2022 PET-CT:  Excellent response to therapy since her last exam with interval resolution or near complete resolution of multiple hypermetabolic lymph nodes above and below the diaphragm.  There is residual mild-to-moderate uptake in the right axillary lymph node, Deauville score 4  Interval resolution of diffuse uptake in spleen which is also decreased in size.  No new suspicious uptake.     05/10/22:-  right lower extremity Doppler negative for DVT       4/13/22 PET CT: multiple hypermetabolic LNs b/l neck. Right submandibular LN .7x1.7cm, SUV 7.8. Left submandibular LN 2.3x1.7cm SUV 7.2. Multiple hypermetabolic LNs in chest. Right axillary node 3.3x2cm SUV 8.1. Hypermetabolic left axillary LN SUV 6.4, 1.9x3.2cm. Hypermetabolic right paratracheal LN 2.4x1.6cm SUV 4.9. Right pulmonary hilum SUV 2.7. Subcarinal LN SUV 3.3, 1.2x3.3cm. Spleen enlarged at 15cm and demonstrates heterogeneous mild to moderate uptake SUV 5.2. Moderately intense activity in stomach and proximal duodenum with no degree of focality or discrete mass on CT. Multiple hypermetabolic LNs in abd and pelvis. Left periaortic LN SUV 6.8, 4x3.5cm. Large hypermetabolic mesenteric mass [id number] SUV 8.5. Aortocaval LN 3.5x3.8cm SUV 5.1. Hypermetabolic mass in left pelvis 5.5x7cm SUV 7. Left external iliac LN SUV 6 2.2x2.1cm. Extensive hypermetabolism anteriorly and laterally in left abdomen, associated with soft tissue thickening that  is difficult to separate from bowel loops. B/l inguinal LNs up to 2.9x3.9cm, SUV 6.6    3/29/22 CT neck w/ and w/o contrast: widespread adenopathy identified in parotic glands bilateral, submandibular space, anterior and posterior cervical chains, superior mediastinum and supraclavicular and infraclavicular spaces of the base of neck/upper chest in appearance strongly suspicious for lymphoma. Multiple subcutaneous masses are also identified in lower occipital scalp and upper neck similar to lymphadenopathy. Biopsy of prominent subcutaneous mass in right upper neck is recommended and should present the easiest location for tissue sampling    Path:  5/23/18 peripheral blood flow cytometry: CD5+ B cell population. No abnormal T cell or myeloid cell populations. CD19+, CD20+, CD5+, CD23+, CD11c+ surface kappa light chain+, CD38-, CD22+ (dim/mod), CD43+, FMC7-. Consistent with CLL     04/26/2022:  Bone marrow biopsy-marrow -trilineage hematopoiesis with 10-20% B-cell infiltrate, C5 positive B-cell population with immunophenotype typical for CLL.  CLL fish within normal limits, heavy chain mutation status cannot be determined.  6q, chromosome 12, chromosome 13, chromosome 11, chromosome 17 and t( 11;14) abnormalities not detected.     05/06/2022 left axillary lymph node core biopsy:  Mantle cell lymphoma with a subset consistent with CLL/SLL., CD5 positive monoclonal B-cell population is identified, approximately 91% of total cells, immuno phenotype is nonspecific and may be seen in CLL, marginal zone lymphoma, or large cell lymphoma.  A 2nd small monoclonal B-cell population approximately 2% of the cells identified with an immunophenotype consistent with CLL.  New genomic consultation lymphoma cells positive for CD 20, CD 5, cyclin D1 and BCL2 with a TI 67 of 10%.  There is no significant staining for CD10, BCL6.  However workup consistent with diagnosis of mantle cell lymphoma.     Assessment and plan:       ASSESSMENT:     # Mantle cell lymphoma, [place]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  Positive for CLL.  PET-CT with evidence of bulky lymphadenopathy, largest measuring 14 cm, spleen measuring 15 cm.  Discussed with patient and his family the diagnosis, natural history and treatment options.  Discussed the risk for TLS given bulky lymphadenopathy, perforation given lymphadenopathy around the bowel loops as well as declining ECOG given his advanced age.  Continuet PJP prophylaxis with Bactrim Monday Wednesday Friday and valacyclovir with treatment.  Continue allopurinol 300 mg q.day for TLS prophylaxis.  Patient is status post cycle 1 of bendamustine and rituximab on 05/18/2022.  Patient has bendamustine was reduced by 25% and rituximab was given 1 week apart to reduce the risk of perforation.  S/p cycle 2 and cycle 3 without significant side effects, dosed for bendamustine increased to 100%.  Discussed with patient interim PET-CT with excellent response to treatment however with residual hypermetabolism in right axillary lymph node with a Deauville score of 4.  Given his excellent tolerance of treatment, will proceed to complete 6 cycles of BR in the setting of above PET-CT report.  Discussed alternate treatment plan to proceed with ibrutinib given partial response.  Patient elected to proceed with another 2 cycles of BR with plans to repeat PET-CT after completion of treatment to assess treatment response.  Given incomplete response on PET-CT after completion of 6 hours NC, he was started on ibrutinib in second-line initially at 560 mg q.day.    Given patient's thrombocytopenia, dose was reduced to 420 mg in March 2023  PET-CT in April 2023 with excellent response with resolution of axillary adenopathy  PET-CT in August 2023 with no evidence of disease  PET-CT in December 2023 with findings concerning for  disease recurrence  Patient was started back on ibrutinib 140 mg q.day in the last week of January 2024.    Patient had multiple interruptions on ibrutinib due to persistent/worsening thrombocytopenia  PET-CT in May 2024 with findings consistent with progression of disease  Discussed with patient and family the plan to initiate 3rd line treatment with Revlimid/rituximab.  Discussed the palliative nature of treatment and incurable nature of disease.  Patient was like to proceed with therapy.  Patient was initiated rituximab Revlimid on 06/03/2024.  Revlimid dose reduced to 10 mg days 1-21 every 28 days  PET-CT in August 2024 with progression of disease.    Discussed with patient the options for further treatment or consideration of hospice given advanced age, and relapsed refractory disease.  After further discussions, patient and his family would like to try further treatment   Discussed the options for treatment with venetoclax monotherapy.  Patient was family agreeable to this plan of care.  Discussed side effect profile with patient.  Will consider transitioning to hospice care/comfort care if patient is not tolerate venetoclax well.     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      Based on available information, likely SLL/CLL with some constitutional symptoms including weight loss, fatigue and night sweats. Adenopathy easily palpable above and below the diaphragm. Mild thrombocytopenia w/ PLT count ~120k and very mild anemia with Hg ~12 g/dl. No hypercalcemia, no evidence of immune paresis  Biopsies 4/11/22 with Dr Brannon were FNAs and non diagnostic for CLL. I do not know what IHC was obtained or whether pathologist knew what the potential diagnosis was as report only says negative for carcinoma. Will discuss with Dr Lan to add CD5, CD10, CD19, CD20 etc if enough tissue  Regardless, will also refer for axillary LN biopsy, either excisional or at the very least core. Referral sent  today  PET CT 4/2022 findings are noted. Given his advanced age he was not refer for EGD. Masses in abdomen are likely matted gurinder conglomerates and I do not think it would be worth it to attempt a biopsy of those  CLL FISH  Negative , IGHV mutation status  Hep B and C negative  Given concomitant diagnosis of CLL and mantle cell lymphoma will plan to initiate treatment for mantle cell lymphoma and CLL with bendamustine rituximab.  Will consider BTK inhibitors if patient is intolerant to bendamustine/rituximab in the second-line        Plan:  Had an extensive discussion with patient, his wife and his daughter on the phone.  Patient was made a decision to stop any further treatments and transition his care to comfort care with hospice services   I agree with this decision given his intolerance to treatment, and an extreme decline in his functional status  Referral sent to hospice today.             Portions of the record may have been created with voice recognition software. Occasional wrong-word or sound-a-like substitutions may have occurred due to the inherent limitations of voice recognition software.                         This service was not originating from a related E/M service provided within the previous 7 days nor will  to an E/M service or procedure within the next 24 hours or my soonest available appointment.  Prevailing standard of care was able to be met in this audio-only visit.